# Patient Record
Sex: FEMALE | Race: WHITE | Employment: FULL TIME
[De-identification: names, ages, dates, MRNs, and addresses within clinical notes are randomized per-mention and may not be internally consistent; named-entity substitution may affect disease eponyms.]

---

## 2017-01-31 ENCOUNTER — CARE COORDINATOR VISIT (OUTPATIENT)
Dept: CARE COORDINATION | Facility: CLINIC | Age: 42
End: 2017-01-31

## 2017-01-31 ENCOUNTER — OFFICE VISIT (OUTPATIENT)
Dept: INTERNAL MEDICINE | Facility: CLINIC | Age: 42
End: 2017-01-31

## 2017-01-31 ENCOUNTER — CARE COORDINATION (OUTPATIENT)
Dept: CARE COORDINATION | Facility: CLINIC | Age: 42
End: 2017-01-31

## 2017-01-31 VITALS
DIASTOLIC BLOOD PRESSURE: 74 MMHG | WEIGHT: 220 LBS | HEIGHT: 62 IN | SYSTOLIC BLOOD PRESSURE: 120 MMHG | BODY MASS INDEX: 40.48 KG/M2 | HEART RATE: 90 BPM

## 2017-01-31 DIAGNOSIS — J41.0 SIMPLE CHRONIC BRONCHITIS (HCC): Primary | ICD-10-CM

## 2017-01-31 DIAGNOSIS — F17.200 TOBACCO DEPENDENCE: ICD-10-CM

## 2017-01-31 DIAGNOSIS — F17.200 SMOKER: ICD-10-CM

## 2017-01-31 DIAGNOSIS — I82.503 CHRONIC DEEP VEIN THROMBOSIS (DVT) OF BOTH LOWER EXTREMITIES, UNSPECIFIED VEIN (HCC): ICD-10-CM

## 2017-01-31 DIAGNOSIS — Z13.9 SCREENING: Primary | ICD-10-CM

## 2017-01-31 DIAGNOSIS — H92.02 OTALGIA, LEFT: ICD-10-CM

## 2017-01-31 DIAGNOSIS — J41.0 SIMPLE CHRONIC BRONCHITIS (HCC): ICD-10-CM

## 2017-01-31 DIAGNOSIS — E03.9 HYPOTHYROIDISM, UNSPECIFIED TYPE: ICD-10-CM

## 2017-01-31 DIAGNOSIS — I10 ESSENTIAL HYPERTENSION: ICD-10-CM

## 2017-01-31 PROCEDURE — 99213 OFFICE O/P EST LOW 20 MIN: CPT | Performed by: INTERNAL MEDICINE

## 2017-01-31 RX ORDER — LEVOTHYROXINE SODIUM 0.2 MG/1
200 TABLET ORAL DAILY
Qty: 30 TABLET | Refills: 0 | Status: SHIPPED | OUTPATIENT
Start: 2017-01-31 | End: 2017-02-14 | Stop reason: SDUPTHER

## 2017-01-31 RX ORDER — ALBUTEROL SULFATE 2.5 MG/3ML
2.5 SOLUTION RESPIRATORY (INHALATION) EVERY 4 HOURS PRN
Qty: 300 ML | Refills: 9 | Status: SHIPPED | OUTPATIENT
Start: 2017-01-31 | End: 2019-03-15 | Stop reason: SDUPTHER

## 2017-01-31 RX ORDER — AMOXICILLIN AND CLAVULANATE POTASSIUM 875; 125 MG/1; MG/1
1 TABLET, FILM COATED ORAL 2 TIMES DAILY
Qty: 20 TABLET | Refills: 0 | Status: SHIPPED | OUTPATIENT
Start: 2017-01-31 | End: 2017-02-10

## 2017-01-31 RX ORDER — WARFARIN SODIUM 10 MG/1
10 TABLET ORAL DAILY
Qty: 30 TABLET | Refills: 5 | Status: SHIPPED | OUTPATIENT
Start: 2017-01-31 | End: 2017-09-15

## 2017-01-31 RX ORDER — HYDROCHLOROTHIAZIDE 25 MG/1
25 TABLET ORAL DAILY
Qty: 30 TABLET | Refills: 9 | Status: ON HOLD | OUTPATIENT
Start: 2017-01-31 | End: 2017-08-07 | Stop reason: HOSPADM

## 2017-01-31 ASSESSMENT — ENCOUNTER SYMPTOMS
BACK PAIN: 0
COLOR CHANGE: 0
EYE DISCHARGE: 0
ABDOMINAL PAIN: 0
ABDOMINAL DISTENTION: 0
SHORTNESS OF BREATH: 1
CHOKING: 0
RHINORRHEA: 1
COUGH: 1
STRIDOR: 0
EYE ITCHING: 0
CHEST TIGHTNESS: 0
APNEA: 0

## 2017-02-02 ENCOUNTER — CARE COORDINATION (OUTPATIENT)
Dept: CARE COORDINATION | Facility: CLINIC | Age: 42
End: 2017-02-02

## 2017-02-08 ENCOUNTER — TELEPHONE (OUTPATIENT)
Dept: INTERNAL MEDICINE | Facility: CLINIC | Age: 42
End: 2017-02-08

## 2017-02-13 ENCOUNTER — TELEPHONE (OUTPATIENT)
Dept: INTERNAL MEDICINE | Facility: CLINIC | Age: 42
End: 2017-02-13

## 2017-02-13 ENCOUNTER — OFFICE VISIT (OUTPATIENT)
Dept: INTERNAL MEDICINE | Facility: CLINIC | Age: 42
End: 2017-02-13

## 2017-02-13 ENCOUNTER — HOSPITAL ENCOUNTER (OUTPATIENT)
Age: 42
Setting detail: SPECIMEN
Discharge: HOME | End: 2017-02-13
Payer: COMMERCIAL

## 2017-02-13 VITALS
SYSTOLIC BLOOD PRESSURE: 122 MMHG | BODY MASS INDEX: 40.3 KG/M2 | HEIGHT: 62 IN | DIASTOLIC BLOOD PRESSURE: 78 MMHG | WEIGHT: 219 LBS

## 2017-02-13 DIAGNOSIS — J41.0 SIMPLE CHRONIC BRONCHITIS (HCC): Primary | ICD-10-CM

## 2017-02-13 DIAGNOSIS — I63.321 CEREBRAL INFARCTION DUE TO THROMBOSIS OF RIGHT ANTERIOR CEREBRAL ARTERY (HCC): ICD-10-CM

## 2017-02-13 DIAGNOSIS — I82.503 CHRONIC DEEP VEIN THROMBOSIS (DVT) OF BOTH LOWER EXTREMITIES, UNSPECIFIED VEIN (HCC): ICD-10-CM

## 2017-02-13 DIAGNOSIS — I10 ESSENTIAL HYPERTENSION: ICD-10-CM

## 2017-02-13 DIAGNOSIS — E03.9 ACQUIRED HYPOTHYROIDISM: ICD-10-CM

## 2017-02-13 DIAGNOSIS — E03.9 HYPOTHYROIDISM, UNSPECIFIED TYPE: ICD-10-CM

## 2017-02-13 DIAGNOSIS — J44.1 COPD WITH EXACERBATION (HCC): ICD-10-CM

## 2017-02-13 LAB
INR BLD: 1
PROTHROMBIN TIME: 10.9 SEC (ref 9.4–12.6)
THYROXINE, FREE: 0.77 NG/DL (ref 0.93–1.7)
TSH SERPL DL<=0.05 MIU/L-ACNC: 15.65 MIU/L (ref 0.3–5)

## 2017-02-13 PROCEDURE — 99214 OFFICE O/P EST MOD 30 MIN: CPT | Performed by: INTERNAL MEDICINE

## 2017-02-13 RX ORDER — METHYLPREDNISOLONE 4 MG/1
TABLET ORAL
Qty: 1 KIT | Refills: 0 | Status: SHIPPED | OUTPATIENT
Start: 2017-02-13 | End: 2017-02-19

## 2017-02-13 RX ORDER — CEFUROXIME AXETIL 500 MG/1
500 TABLET ORAL 2 TIMES DAILY
Qty: 20 TABLET | Refills: 0 | Status: SHIPPED | OUTPATIENT
Start: 2017-02-13 | End: 2017-02-23

## 2017-02-13 ASSESSMENT — ENCOUNTER SYMPTOMS
STRIDOR: 0
NAUSEA: 0
BLOOD IN STOOL: 0
WHEEZING: 1
VOICE CHANGE: 0
COUGH: 1
CONSTIPATION: 0
SINUS PRESSURE: 0
CHOKING: 0
EYE PAIN: 0
SORE THROAT: 0
RHINORRHEA: 0
ABDOMINAL PAIN: 0
DIARRHEA: 0
RECTAL PAIN: 0
EYE REDNESS: 0
FACIAL SWELLING: 0
VOMITING: 0
COLOR CHANGE: 0
EYE ITCHING: 0
PHOTOPHOBIA: 0
TROUBLE SWALLOWING: 0
SHORTNESS OF BREATH: 1
ANAL BLEEDING: 0
APNEA: 0
ABDOMINAL DISTENTION: 0
BACK PAIN: 0
EYE DISCHARGE: 0
CHEST TIGHTNESS: 0

## 2017-02-14 DIAGNOSIS — E03.9 HYPOTHYROIDISM, UNSPECIFIED TYPE: ICD-10-CM

## 2017-02-14 DIAGNOSIS — R79.89 HIGH THYROID STIMULATING HORMONE (TSH) LEVEL: Primary | ICD-10-CM

## 2017-02-14 RX ORDER — PREDNISONE 20 MG/1
20 TABLET ORAL DAILY
Qty: 5 TABLET | Refills: 0 | Status: CANCELLED | OUTPATIENT
Start: 2017-02-14 | End: 2017-02-19

## 2017-02-14 RX ORDER — LEVOTHYROXINE SODIUM 0.12 MG/1
250 TABLET ORAL DAILY
Qty: 60 TABLET | Refills: 3 | Status: SHIPPED | OUTPATIENT
Start: 2017-02-14 | End: 2019-03-15 | Stop reason: SDUPTHER

## 2017-02-14 RX ORDER — AMOXICILLIN 875 MG/1
875 TABLET, COATED ORAL DAILY
Qty: 5 TABLET | Refills: 0 | Status: SHIPPED | OUTPATIENT
Start: 2017-02-14 | End: 2017-02-19

## 2017-02-14 RX ORDER — AMOXICILLIN 250 MG/1
250 CAPSULE ORAL 3 TIMES DAILY
Qty: 30 CAPSULE | Refills: 0 | Status: CANCELLED | OUTPATIENT
Start: 2017-02-14 | End: 2017-02-24

## 2017-02-14 RX ORDER — LEVOTHYROXINE SODIUM 0.12 MG/1
250 TABLET ORAL DAILY
Qty: 60 TABLET | Refills: 3 | Status: SHIPPED | OUTPATIENT
Start: 2017-02-14 | End: 2017-02-14 | Stop reason: SDUPTHER

## 2017-02-14 RX ORDER — PREDNISONE 20 MG/1
20 TABLET ORAL DAILY
Qty: 10 TABLET | Refills: 0 | Status: SHIPPED | OUTPATIENT
Start: 2017-02-14 | End: 2017-02-24

## 2017-03-03 ENCOUNTER — HOSPITAL ENCOUNTER (OUTPATIENT)
Dept: WOMENS IMAGING | Age: 42
Discharge: HOME | End: 2017-03-03
Payer: COMMERCIAL

## 2017-03-03 DIAGNOSIS — Z13.9 SCREENING: ICD-10-CM

## 2017-03-03 PROCEDURE — G0202 SCR MAMMO BI INCL CAD: HCPCS

## 2017-04-10 ENCOUNTER — CARE COORDINATION (OUTPATIENT)
Dept: CARE COORDINATION | Age: 42
End: 2017-04-10

## 2017-04-27 ENCOUNTER — CARE COORDINATION (OUTPATIENT)
Dept: CARE COORDINATION | Age: 42
End: 2017-04-27

## 2017-06-20 ENCOUNTER — OFFICE VISIT (OUTPATIENT)
Dept: INTERNAL MEDICINE CLINIC | Age: 42
End: 2017-06-20
Payer: COMMERCIAL

## 2017-06-20 ENCOUNTER — HOSPITAL ENCOUNTER (OUTPATIENT)
Age: 42
Setting detail: SPECIMEN
Discharge: HOME | End: 2017-06-20
Payer: COMMERCIAL

## 2017-06-20 ENCOUNTER — HOSPITAL ENCOUNTER (OUTPATIENT)
Dept: GENERAL RADIOLOGY | Facility: CLINIC | Age: 42
Discharge: HOME | End: 2017-06-20
Payer: COMMERCIAL

## 2017-06-20 ENCOUNTER — HOSPITAL ENCOUNTER (OUTPATIENT)
Facility: CLINIC | Age: 42
Discharge: HOME | End: 2017-06-20
Payer: COMMERCIAL

## 2017-06-20 VITALS
WEIGHT: 217 LBS | BODY MASS INDEX: 39.93 KG/M2 | SYSTOLIC BLOOD PRESSURE: 130 MMHG | HEART RATE: 85 BPM | HEIGHT: 62 IN | DIASTOLIC BLOOD PRESSURE: 80 MMHG

## 2017-06-20 DIAGNOSIS — E03.9 HYPOTHYROIDISM, UNSPECIFIED TYPE: ICD-10-CM

## 2017-06-20 DIAGNOSIS — J01.00 ACUTE NON-RECURRENT MAXILLARY SINUSITIS: Primary | ICD-10-CM

## 2017-06-20 DIAGNOSIS — M79.642 PAIN OF LEFT HAND: ICD-10-CM

## 2017-06-20 LAB — TSH SERPL DL<=0.05 MIU/L-ACNC: 4.1 MIU/L (ref 0.3–5)

## 2017-06-20 PROCEDURE — G8598 ASA/ANTIPLAT THER USED: HCPCS | Performed by: INTERNAL MEDICINE

## 2017-06-20 PROCEDURE — G8427 DOCREV CUR MEDS BY ELIG CLIN: HCPCS | Performed by: INTERNAL MEDICINE

## 2017-06-20 PROCEDURE — 73120 X-RAY EXAM OF HAND: CPT

## 2017-06-20 PROCEDURE — 99213 OFFICE O/P EST LOW 20 MIN: CPT | Performed by: INTERNAL MEDICINE

## 2017-06-20 PROCEDURE — G8417 CALC BMI ABV UP PARAM F/U: HCPCS | Performed by: INTERNAL MEDICINE

## 2017-06-20 PROCEDURE — 4004F PT TOBACCO SCREEN RCVD TLK: CPT | Performed by: INTERNAL MEDICINE

## 2017-06-20 RX ORDER — LORATADINE 10 MG/1
10 TABLET ORAL DAILY
Qty: 10 TABLET | Refills: 0 | Status: SHIPPED | OUTPATIENT
Start: 2017-06-20 | End: 2017-08-02 | Stop reason: ALTCHOICE

## 2017-06-20 RX ORDER — AMOXICILLIN 500 MG/1
500 CAPSULE ORAL 3 TIMES DAILY
Qty: 30 CAPSULE | Refills: 0 | Status: SHIPPED | OUTPATIENT
Start: 2017-06-20 | End: 2017-06-30

## 2017-06-20 ASSESSMENT — ENCOUNTER SYMPTOMS
ABDOMINAL PAIN: 0
APNEA: 0
BLOOD IN STOOL: 0
RHINORRHEA: 1
EYE DISCHARGE: 0
EYE REDNESS: 0
CHOKING: 0
CHEST TIGHTNESS: 0
EYE ITCHING: 0
COUGH: 1
SHORTNESS OF BREATH: 0
SINUS PRESSURE: 1
EYE PAIN: 0
DIARRHEA: 0
ABDOMINAL DISTENTION: 0
COLOR CHANGE: 0
BACK PAIN: 0
CONSTIPATION: 0

## 2017-06-20 ASSESSMENT — PATIENT HEALTH QUESTIONNAIRE - PHQ9
SUM OF ALL RESPONSES TO PHQ QUESTIONS 1-9: 0
SUM OF ALL RESPONSES TO PHQ9 QUESTIONS 1 & 2: 0
1. LITTLE INTEREST OR PLEASURE IN DOING THINGS: 0
2. FEELING DOWN, DEPRESSED OR HOPELESS: 0

## 2017-06-27 ENCOUNTER — OFFICE VISIT (OUTPATIENT)
Dept: ORTHOPEDIC SURGERY | Age: 42
End: 2017-06-27
Payer: COMMERCIAL

## 2017-06-27 DIAGNOSIS — S82.65XA CLOSED NONDISPLACED FRACTURE OF LATERAL MALLEOLUS OF LEFT FIBULA, INITIAL ENCOUNTER: Primary | ICD-10-CM

## 2017-06-27 PROCEDURE — 99203 OFFICE O/P NEW LOW 30 MIN: CPT | Performed by: ORTHOPAEDIC SURGERY

## 2017-06-27 PROCEDURE — 27786 TREATMENT OF ANKLE FRACTURE: CPT | Performed by: ORTHOPAEDIC SURGERY

## 2017-06-27 RX ORDER — HYDROCODONE BITARTRATE AND ACETAMINOPHEN 5; 325 MG/1; MG/1
1-2 TABLET ORAL EVERY 4 HOURS PRN
Qty: 40 TABLET | Refills: 0 | Status: SHIPPED | OUTPATIENT
Start: 2017-06-27 | End: 2017-07-04

## 2017-07-13 ENCOUNTER — OFFICE VISIT (OUTPATIENT)
Dept: ORTHOPEDIC SURGERY | Age: 42
End: 2017-07-13

## 2017-07-13 DIAGNOSIS — S82.65XD CLOSED NONDISPLACED FRACTURE OF LATERAL MALLEOLUS OF LEFT FIBULA WITH ROUTINE HEALING, SUBSEQUENT ENCOUNTER: Primary | ICD-10-CM

## 2017-07-13 PROCEDURE — 99024 POSTOP FOLLOW-UP VISIT: CPT | Performed by: ORTHOPAEDIC SURGERY

## 2017-07-13 RX ORDER — HYDROCODONE BITARTRATE AND ACETAMINOPHEN 5; 325 MG/1; MG/1
1-2 TABLET ORAL EVERY 4 HOURS PRN
Qty: 40 TABLET | Refills: 0 | Status: SHIPPED | OUTPATIENT
Start: 2017-07-13 | End: 2017-07-20

## 2017-08-02 ENCOUNTER — OFFICE VISIT (OUTPATIENT)
Dept: INTERNAL MEDICINE CLINIC | Age: 42
End: 2017-08-02
Payer: COMMERCIAL

## 2017-08-02 ENCOUNTER — HOSPITAL ENCOUNTER (OUTPATIENT)
Age: 42
Setting detail: SPECIMEN
Discharge: HOME | End: 2017-08-02
Payer: COMMERCIAL

## 2017-08-02 VITALS
BODY MASS INDEX: 38.28 KG/M2 | SYSTOLIC BLOOD PRESSURE: 138 MMHG | HEIGHT: 62 IN | DIASTOLIC BLOOD PRESSURE: 78 MMHG | WEIGHT: 208 LBS

## 2017-08-02 DIAGNOSIS — E03.9 ACQUIRED HYPOTHYROIDISM: ICD-10-CM

## 2017-08-02 DIAGNOSIS — K52.9 GASTROENTERITIS: Primary | ICD-10-CM

## 2017-08-02 DIAGNOSIS — K52.9 GASTROENTERITIS: ICD-10-CM

## 2017-08-02 DIAGNOSIS — R11.12 PROJECTILE VOMITING WITH NAUSEA: ICD-10-CM

## 2017-08-02 DIAGNOSIS — I10 ESSENTIAL HYPERTENSION: ICD-10-CM

## 2017-08-02 LAB
ALBUMIN SERPL-MCNC: 4.4 G/DL (ref 3.5–5.2)
ALBUMIN/GLOBULIN RATIO: 1.3 (ref 1–2.5)
ALP BLD-CCNC: 164 U/L (ref 35–104)
ALT SERPL-CCNC: 101 U/L (ref 5–33)
ANION GAP SERPL CALCULATED.3IONS-SCNC: 13 MMOL/L (ref 9–17)
AST SERPL-CCNC: 73 U/L
BILIRUB SERPL-MCNC: 0.41 MG/DL (ref 0.3–1.2)
BUN BLDV-MCNC: 6 MG/DL (ref 6–20)
BUN/CREAT BLD: ABNORMAL (ref 9–20)
CALCIUM SERPL-MCNC: 15.7 MG/DL (ref 8.6–10.4)
CHLORIDE BLD-SCNC: 102 MMOL/L (ref 98–107)
CO2: 25 MMOL/L (ref 20–31)
CREAT SERPL-MCNC: 0.56 MG/DL (ref 0.5–0.9)
GFR AFRICAN AMERICAN: >60 ML/MIN
GFR NON-AFRICAN AMERICAN: >60 ML/MIN
GFR SERPL CREATININE-BSD FRML MDRD: ABNORMAL ML/MIN/{1.73_M2}
GFR SERPL CREATININE-BSD FRML MDRD: ABNORMAL ML/MIN/{1.73_M2}
GLUCOSE BLD-MCNC: 123 MG/DL (ref 70–99)
LIPASE: 35 U/L (ref 13–60)
POTASSIUM SERPL-SCNC: 3.9 MMOL/L (ref 3.7–5.3)
SODIUM BLD-SCNC: 140 MMOL/L (ref 135–144)
TOTAL PROTEIN: 7.8 G/DL (ref 6.4–8.3)

## 2017-08-02 PROCEDURE — G8598 ASA/ANTIPLAT THER USED: HCPCS | Performed by: INTERNAL MEDICINE

## 2017-08-02 PROCEDURE — 4004F PT TOBACCO SCREEN RCVD TLK: CPT | Performed by: INTERNAL MEDICINE

## 2017-08-02 PROCEDURE — 99214 OFFICE O/P EST MOD 30 MIN: CPT | Performed by: INTERNAL MEDICINE

## 2017-08-02 PROCEDURE — G8427 DOCREV CUR MEDS BY ELIG CLIN: HCPCS | Performed by: INTERNAL MEDICINE

## 2017-08-02 PROCEDURE — G8417 CALC BMI ABV UP PARAM F/U: HCPCS | Performed by: INTERNAL MEDICINE

## 2017-08-02 RX ORDER — METOCLOPRAMIDE 10 MG/1
10 TABLET ORAL 4 TIMES DAILY
Qty: 120 TABLET | Refills: 3 | Status: SHIPPED | OUTPATIENT
Start: 2017-08-02 | End: 2019-03-15

## 2017-08-02 RX ORDER — OMEPRAZOLE 20 MG/1
20 CAPSULE, DELAYED RELEASE ORAL DAILY
Qty: 30 CAPSULE | Refills: 3 | Status: SHIPPED | OUTPATIENT
Start: 2017-08-02 | End: 2018-07-11 | Stop reason: ALTCHOICE

## 2017-08-02 RX ORDER — LEVOTHYROXINE SODIUM 0.07 MG/1
75 TABLET ORAL
COMMUNITY
End: 2017-09-15 | Stop reason: DRUGHIGH

## 2017-08-02 RX ORDER — OXYCODONE HYDROCHLORIDE AND ACETAMINOPHEN 5; 325 MG/1; MG/1
TABLET ORAL
Refills: 0 | COMMUNITY
Start: 2017-06-24 | End: 2017-08-14 | Stop reason: ALTCHOICE

## 2017-08-02 ASSESSMENT — ENCOUNTER SYMPTOMS
EYE DISCHARGE: 0
VOMITING: 1
SHORTNESS OF BREATH: 0
WHEEZING: 0
TROUBLE SWALLOWING: 0
EYE PAIN: 0
BLOOD IN STOOL: 0
ABDOMINAL PAIN: 1
ABDOMINAL DISTENTION: 0
DIARRHEA: 0
NAUSEA: 1
COLOR CHANGE: 0
COUGH: 0

## 2017-08-03 ENCOUNTER — APPOINTMENT (OUTPATIENT)
Dept: GENERAL RADIOLOGY | Age: 42
DRG: 645 | End: 2017-08-03
Payer: COMMERCIAL

## 2017-08-03 ENCOUNTER — HOSPITAL ENCOUNTER (INPATIENT)
Age: 42
LOS: 4 days | Discharge: HOME | DRG: 645 | End: 2017-08-07
Attending: EMERGENCY MEDICINE | Admitting: INTERNAL MEDICINE
Payer: COMMERCIAL

## 2017-08-03 ENCOUNTER — APPOINTMENT (OUTPATIENT)
Dept: ULTRASOUND IMAGING | Age: 42
DRG: 645 | End: 2017-08-03
Payer: COMMERCIAL

## 2017-08-03 DIAGNOSIS — E83.52 HYPERCALCEMIA: Primary | ICD-10-CM

## 2017-08-03 LAB
ABSOLUTE EOS #: 0.18 K/UL (ref 0–0.4)
ABSOLUTE LYMPH #: 5.45 K/UL (ref 1–4.8)
ABSOLUTE MONO #: 0.44 K/UL (ref 0.1–1.3)
ALBUMIN SERPL-MCNC: 4.2 G/DL (ref 3.5–5.2)
ALBUMIN/GLOBULIN RATIO: ABNORMAL (ref 1–2.5)
ALP BLD-CCNC: 168 U/L (ref 35–104)
ALT SERPL-CCNC: 96 U/L (ref 5–33)
ANGIOTENSIN-CONVERTING ENZYME: 28 U/L (ref 8–52)
ANION GAP SERPL CALCULATED.3IONS-SCNC: 10 MMOL/L (ref 9–17)
ANION GAP SERPL CALCULATED.3IONS-SCNC: 11 MMOL/L (ref 9–17)
AST SERPL-CCNC: 66 U/L
BASOPHILS # BLD: 0 %
BASOPHILS ABSOLUTE: 0 K/UL (ref 0–0.2)
BILIRUB SERPL-MCNC: 0.33 MG/DL (ref 0.3–1.2)
BUN BLDV-MCNC: 5 MG/DL (ref 6–20)
BUN BLDV-MCNC: 6 MG/DL (ref 6–20)
BUN/CREAT BLD: ABNORMAL (ref 9–20)
BUN/CREAT BLD: ABNORMAL (ref 9–20)
CALCIUM IONIZED: 2.36 MMOL/L (ref 1.13–1.33)
CALCIUM SERPL-MCNC: 15.5 MG/DL (ref 8.6–10.4)
CALCIUM SERPL-MCNC: 16 MG/DL (ref 8.6–10.4)
CALCIUM URINE: 32.5 MG/DL
CHLORIDE BLD-SCNC: 100 MMOL/L (ref 98–107)
CHLORIDE BLD-SCNC: 102 MMOL/L (ref 98–107)
CO2: 28 MMOL/L (ref 20–31)
CO2: 29 MMOL/L (ref 20–31)
CREAT SERPL-MCNC: 0.49 MG/DL (ref 0.5–0.9)
CREAT SERPL-MCNC: 0.55 MG/DL (ref 0.5–0.9)
DIFFERENTIAL TYPE: ABNORMAL
EOSINOPHILS RELATIVE PERCENT: 2 %
GFR AFRICAN AMERICAN: >60 ML/MIN
GFR AFRICAN AMERICAN: >60 ML/MIN
GFR NON-AFRICAN AMERICAN: >60 ML/MIN
GFR NON-AFRICAN AMERICAN: >60 ML/MIN
GFR SERPL CREATININE-BSD FRML MDRD: ABNORMAL ML/MIN/{1.73_M2}
GLUCOSE BLD-MCNC: 107 MG/DL (ref 70–99)
GLUCOSE BLD-MCNC: 143 MG/DL (ref 70–99)
HCT VFR BLD CALC: 44.3 % (ref 36–46)
HEMOGLOBIN: 15.2 G/DL (ref 12–16)
LYMPHOCYTES # BLD: 62 %
MCH RBC QN AUTO: 32.6 PG (ref 26–34)
MCHC RBC AUTO-ENTMCNC: 34.3 G/DL (ref 31–37)
MCV RBC AUTO: 95.2 FL (ref 80–100)
MONOCYTES # BLD: 5 %
MORPHOLOGY: NORMAL
PDW BLD-RTO: 12.3 % (ref 11.5–14.9)
PHOSPHORUS: 2.3 MG/DL (ref 2.6–4.5)
PLATELET # BLD: 185 K/UL (ref 150–450)
PLATELET ESTIMATE: ABNORMAL
PMV BLD AUTO: 9.5 FL (ref 6–12)
POTASSIUM SERPL-SCNC: 3.6 MMOL/L (ref 3.7–5.3)
POTASSIUM SERPL-SCNC: 3.6 MMOL/L (ref 3.7–5.3)
PTH INTACT: 220.4 PG/ML (ref 15–65)
RBC # BLD: 4.65 M/UL (ref 4–5.2)
RBC # BLD: ABNORMAL 10*6/UL
SEG NEUTROPHILS: 31 %
SEGMENTED NEUTROPHILS ABSOLUTE COUNT: 2.73 K/UL (ref 1.3–9.1)
SODIUM BLD-SCNC: 139 MMOL/L (ref 135–144)
SODIUM BLD-SCNC: 141 MMOL/L (ref 135–144)
TOTAL PROTEIN: 7.6 G/DL (ref 6.4–8.3)
TSH SERPL DL<=0.05 MIU/L-ACNC: 4.41 MIU/L (ref 0.3–5)
VITAMIN D 25-HYDROXY: 18.1 NG/ML (ref 30–100)
WBC # BLD: 8.8 K/UL (ref 3.5–11)
WBC # BLD: ABNORMAL 10*3/UL

## 2017-08-03 PROCEDURE — 84156 ASSAY OF PROTEIN URINE: CPT

## 2017-08-03 PROCEDURE — 80048 BASIC METABOLIC PNL TOTAL CA: CPT

## 2017-08-03 PROCEDURE — 96374 THER/PROPH/DIAG INJ IV PUSH: CPT

## 2017-08-03 PROCEDURE — 76536 US EXAM OF HEAD AND NECK: CPT

## 2017-08-03 PROCEDURE — 94664 DEMO&/EVAL PT USE INHALER: CPT

## 2017-08-03 PROCEDURE — 84443 ASSAY THYROID STIM HORMONE: CPT

## 2017-08-03 PROCEDURE — 2580000003 HC RX 258: Performed by: STUDENT IN AN ORGANIZED HEALTH CARE EDUCATION/TRAINING PROGRAM

## 2017-08-03 PROCEDURE — 36415 COLL VENOUS BLD VENIPUNCTURE: CPT

## 2017-08-03 PROCEDURE — 82330 ASSAY OF CALCIUM: CPT

## 2017-08-03 PROCEDURE — 84165 PROTEIN E-PHORESIS SERUM: CPT

## 2017-08-03 PROCEDURE — 99223 1ST HOSP IP/OBS HIGH 75: CPT | Performed by: INTERNAL MEDICINE

## 2017-08-03 PROCEDURE — 94760 N-INVAS EAR/PLS OXIMETRY 1: CPT

## 2017-08-03 PROCEDURE — 6370000000 HC RX 637 (ALT 250 FOR IP): Performed by: INTERNAL MEDICINE

## 2017-08-03 PROCEDURE — 2580000003 HC RX 258: Performed by: EMERGENCY MEDICINE

## 2017-08-03 PROCEDURE — 71020 XR CHEST STANDARD TWO VW: CPT

## 2017-08-03 PROCEDURE — G8978 MOBILITY CURRENT STATUS: HCPCS

## 2017-08-03 PROCEDURE — 93005 ELECTROCARDIOGRAM TRACING: CPT

## 2017-08-03 PROCEDURE — 6360000002 HC RX W HCPCS: Performed by: EMERGENCY MEDICINE

## 2017-08-03 PROCEDURE — 83519 RIA NONANTIBODY: CPT

## 2017-08-03 PROCEDURE — 84100 ASSAY OF PHOSPHORUS: CPT

## 2017-08-03 PROCEDURE — 84155 ASSAY OF PROTEIN SERUM: CPT

## 2017-08-03 PROCEDURE — 97161 PT EVAL LOW COMPLEX 20 MIN: CPT

## 2017-08-03 PROCEDURE — 84166 PROTEIN E-PHORESIS/URINE/CSF: CPT

## 2017-08-03 PROCEDURE — 83970 ASSAY OF PARATHORMONE: CPT

## 2017-08-03 PROCEDURE — 6360000002 HC RX W HCPCS: Performed by: STUDENT IN AN ORGANIZED HEALTH CARE EDUCATION/TRAINING PROGRAM

## 2017-08-03 PROCEDURE — 82164 ANGIOTENSIN I ENZYME TEST: CPT

## 2017-08-03 PROCEDURE — 82652 VIT D 1 25-DIHYDROXY: CPT

## 2017-08-03 PROCEDURE — 2060000000 HC ICU INTERMEDIATE R&B

## 2017-08-03 PROCEDURE — 82306 VITAMIN D 25 HYDROXY: CPT

## 2017-08-03 PROCEDURE — 80053 COMPREHEN METABOLIC PANEL: CPT

## 2017-08-03 PROCEDURE — 85025 COMPLETE CBC W/AUTO DIFF WBC: CPT

## 2017-08-03 PROCEDURE — G8979 MOBILITY GOAL STATUS: HCPCS

## 2017-08-03 PROCEDURE — 6370000000 HC RX 637 (ALT 250 FOR IP): Performed by: EMERGENCY MEDICINE

## 2017-08-03 PROCEDURE — 6370000000 HC RX 637 (ALT 250 FOR IP): Performed by: STUDENT IN AN ORGANIZED HEALTH CARE EDUCATION/TRAINING PROGRAM

## 2017-08-03 PROCEDURE — 82340 ASSAY OF CALCIUM IN URINE: CPT

## 2017-08-03 PROCEDURE — 99285 EMERGENCY DEPT VISIT HI MDM: CPT

## 2017-08-03 RX ORDER — ONDANSETRON 2 MG/ML
4 INJECTION INTRAMUSCULAR; INTRAVENOUS ONCE
Status: COMPLETED | OUTPATIENT
Start: 2017-08-03 | End: 2017-08-03

## 2017-08-03 RX ORDER — PANTOPRAZOLE SODIUM 40 MG/1
40 TABLET, DELAYED RELEASE ORAL
Status: DISCONTINUED | OUTPATIENT
Start: 2017-08-03 | End: 2017-08-07 | Stop reason: HOSPADM

## 2017-08-03 RX ORDER — CALCITONIN SALMON 200 [IU]/.09ML
1 SPRAY, METERED NASAL ONCE
Status: DISCONTINUED | OUTPATIENT
Start: 2017-08-03 | End: 2017-08-03

## 2017-08-03 RX ORDER — PROPRANOLOL HYDROCHLORIDE 80 MG/1
80 CAPSULE, EXTENDED RELEASE ORAL DAILY
Status: DISCONTINUED | OUTPATIENT
Start: 2017-08-03 | End: 2017-08-07 | Stop reason: HOSPADM

## 2017-08-03 RX ORDER — LEVOTHYROXINE SODIUM 0.12 MG/1
250 TABLET ORAL DAILY
Status: DISCONTINUED | OUTPATIENT
Start: 2017-08-03 | End: 2017-08-07 | Stop reason: HOSPADM

## 2017-08-03 RX ORDER — POTASSIUM CHLORIDE 20 MEQ/1
40 TABLET, EXTENDED RELEASE ORAL PRN
Status: DISCONTINUED | OUTPATIENT
Start: 2017-08-03 | End: 2017-08-07 | Stop reason: HOSPADM

## 2017-08-03 RX ORDER — HYDROCODONE BITARTRATE AND ACETAMINOPHEN 5; 325 MG/1; MG/1
1 TABLET ORAL EVERY 6 HOURS PRN
COMMUNITY
End: 2017-09-15 | Stop reason: ALTCHOICE

## 2017-08-03 RX ORDER — ACETAMINOPHEN 500 MG
1000 TABLET ORAL ONCE
Status: COMPLETED | OUTPATIENT
Start: 2017-08-03 | End: 2017-08-03

## 2017-08-03 RX ORDER — PANTOPRAZOLE SODIUM 40 MG/1
40 TABLET, DELAYED RELEASE ORAL
Status: DISCONTINUED | OUTPATIENT
Start: 2017-08-04 | End: 2017-08-03

## 2017-08-03 RX ORDER — SODIUM CHLORIDE 0.9 % (FLUSH) 0.9 %
10 SYRINGE (ML) INJECTION PRN
Status: DISCONTINUED | OUTPATIENT
Start: 2017-08-03 | End: 2017-08-07 | Stop reason: HOSPADM

## 2017-08-03 RX ORDER — ONDANSETRON 2 MG/ML
4 INJECTION INTRAMUSCULAR; INTRAVENOUS EVERY 6 HOURS PRN
Status: DISCONTINUED | OUTPATIENT
Start: 2017-08-03 | End: 2017-08-07 | Stop reason: HOSPADM

## 2017-08-03 RX ORDER — NICOTINE 21 MG/24HR
1 PATCH, TRANSDERMAL 24 HOURS TRANSDERMAL DAILY
Status: DISCONTINUED | OUTPATIENT
Start: 2017-08-03 | End: 2017-08-07 | Stop reason: HOSPADM

## 2017-08-03 RX ORDER — 0.9 % SODIUM CHLORIDE 0.9 %
1000 INTRAVENOUS SOLUTION INTRAVENOUS ONCE
Status: COMPLETED | OUTPATIENT
Start: 2017-08-03 | End: 2017-08-03

## 2017-08-03 RX ORDER — MORPHINE SULFATE 4 MG/ML
2 INJECTION, SOLUTION INTRAMUSCULAR; INTRAVENOUS EVERY 4 HOURS PRN
Status: DISCONTINUED | OUTPATIENT
Start: 2017-08-03 | End: 2017-08-07 | Stop reason: HOSPADM

## 2017-08-03 RX ORDER — ACETAMINOPHEN 325 MG/1
650 TABLET ORAL EVERY 4 HOURS PRN
Status: DISCONTINUED | OUTPATIENT
Start: 2017-08-03 | End: 2017-08-07 | Stop reason: HOSPADM

## 2017-08-03 RX ORDER — POTASSIUM CHLORIDE 7.45 MG/ML
10 INJECTION INTRAVENOUS PRN
Status: DISCONTINUED | OUTPATIENT
Start: 2017-08-03 | End: 2017-08-07 | Stop reason: HOSPADM

## 2017-08-03 RX ORDER — SODIUM CHLORIDE 9 MG/ML
INJECTION, SOLUTION INTRAVENOUS CONTINUOUS
Status: DISCONTINUED | OUTPATIENT
Start: 2017-08-03 | End: 2017-08-07 | Stop reason: HOSPADM

## 2017-08-03 RX ORDER — POTASSIUM CHLORIDE 20MEQ/15ML
40 LIQUID (ML) ORAL PRN
Status: DISCONTINUED | OUTPATIENT
Start: 2017-08-03 | End: 2017-08-07 | Stop reason: HOSPADM

## 2017-08-03 RX ORDER — AMLODIPINE BESYLATE 2.5 MG/1
2.5 TABLET ORAL DAILY
Status: DISCONTINUED | OUTPATIENT
Start: 2017-08-03 | End: 2017-08-07 | Stop reason: HOSPADM

## 2017-08-03 RX ORDER — SODIUM CHLORIDE 0.9 % (FLUSH) 0.9 %
10 SYRINGE (ML) INJECTION EVERY 12 HOURS SCHEDULED
Status: DISCONTINUED | OUTPATIENT
Start: 2017-08-03 | End: 2017-08-07 | Stop reason: HOSPADM

## 2017-08-03 RX ADMIN — AMLODIPINE BESYLATE 2.5 MG: 2.5 TABLET ORAL at 20:59

## 2017-08-03 RX ADMIN — LEVOTHYROXINE SODIUM 250 MCG: 125 TABLET ORAL at 20:59

## 2017-08-03 RX ADMIN — ENOXAPARIN SODIUM 40 MG: 40 INJECTION SUBCUTANEOUS at 12:23

## 2017-08-03 RX ADMIN — SODIUM CHLORIDE 1000 ML: 9 INJECTION, SOLUTION INTRAVENOUS at 08:51

## 2017-08-03 RX ADMIN — ONDANSETRON 4 MG: 2 INJECTION INTRAMUSCULAR; INTRAVENOUS at 10:15

## 2017-08-03 RX ADMIN — PROPRANOLOL HYDROCHLORIDE 80 MG: 80 CAPSULE, EXTENDED RELEASE ORAL at 20:59

## 2017-08-03 RX ADMIN — PANTOPRAZOLE SODIUM 40 MG: 40 TABLET, DELAYED RELEASE ORAL at 15:38

## 2017-08-03 RX ADMIN — ACETAMINOPHEN 1000 MG: 500 TABLET ORAL at 10:15

## 2017-08-03 RX ADMIN — SODIUM CHLORIDE: 9 INJECTION, SOLUTION INTRAVENOUS at 12:23

## 2017-08-03 RX ADMIN — ONDANSETRON 4 MG: 2 INJECTION INTRAMUSCULAR; INTRAVENOUS at 15:38

## 2017-08-03 RX ADMIN — Medication 10 ML: at 12:23

## 2017-08-03 RX ADMIN — SODIUM CHLORIDE 1000 ML: 9 INJECTION, SOLUTION INTRAVENOUS at 15:19

## 2017-08-03 RX ADMIN — PAMIDRONATE DISODIUM 90 MG: 9 INJECTION, POWDER, LYOPHILIZED, FOR SOLUTION INTRAVENOUS at 15:19

## 2017-08-03 ASSESSMENT — ENCOUNTER SYMPTOMS
ABDOMINAL DISTENTION: 1
EYE PAIN: 0
SHORTNESS OF BREATH: 0
COUGH: 0
NAUSEA: 1
CHEST TIGHTNESS: 0
BACK PAIN: 0
BLOOD IN STOOL: 0
ABDOMINAL PAIN: 1
EYE REDNESS: 0
VOMITING: 1
RHINORRHEA: 0
DIARRHEA: 1

## 2017-08-03 ASSESSMENT — PAIN DESCRIPTION - LOCATION: LOCATION: FLANK

## 2017-08-03 ASSESSMENT — PAIN SCALES - WONG BAKER: WONGBAKER_NUMERICALRESPONSE: 8

## 2017-08-03 ASSESSMENT — PAIN DESCRIPTION - ORIENTATION: ORIENTATION: RIGHT

## 2017-08-03 ASSESSMENT — PAIN SCALES - GENERAL
PAINLEVEL_OUTOF10: 7
PAINLEVEL_OUTOF10: 7

## 2017-08-04 ENCOUNTER — APPOINTMENT (OUTPATIENT)
Dept: CT IMAGING | Age: 42
DRG: 645 | End: 2017-08-04
Payer: COMMERCIAL

## 2017-08-04 ENCOUNTER — APPOINTMENT (OUTPATIENT)
Dept: NUCLEAR MEDICINE | Age: 42
DRG: 645 | End: 2017-08-04
Payer: COMMERCIAL

## 2017-08-04 LAB
ABSOLUTE EOS #: 0.3 K/UL (ref 0–0.4)
ABSOLUTE LYMPH #: 3.6 K/UL (ref 1–4.8)
ABSOLUTE MONO #: 0.5 K/UL (ref 0.1–1.3)
ANION GAP SERPL CALCULATED.3IONS-SCNC: 12 MMOL/L (ref 9–17)
BASOPHILS # BLD: 1 %
BASOPHILS ABSOLUTE: 0.1 K/UL (ref 0–0.2)
BUN BLDV-MCNC: 5 MG/DL (ref 6–20)
BUN/CREAT BLD: ABNORMAL (ref 9–20)
CALCIUM IONIZED: 2.17 MMOL/L (ref 1.13–1.33)
CALCIUM SERPL-MCNC: 14.8 MG/DL (ref 8.6–10.4)
CALCIUM TIMED UR: ABNORMAL MG/X H
CALCIUM URINE: 14.8 MG/DL
CALCIUM, URINE: 951 MG/24 H (ref 20–275)
CHLORIDE BLD-SCNC: 103 MMOL/L (ref 98–107)
CO2: 26 MMOL/L (ref 20–31)
CREAT SERPL-MCNC: 0.57 MG/DL (ref 0.5–0.9)
CREATININE TIMED UR: ABNORMAL MG/ X H
CREATININE URINE: 24.8 MG/DL
CREATININE, 24H UR: 1593 MG/24 H (ref 740–1570)
DIFFERENTIAL TYPE: NORMAL
EOSINOPHILS RELATIVE PERCENT: 3 %
GFR AFRICAN AMERICAN: >60 ML/MIN
GFR NON-AFRICAN AMERICAN: >60 ML/MIN
GFR SERPL CREATININE-BSD FRML MDRD: ABNORMAL ML/MIN/{1.73_M2}
GFR SERPL CREATININE-BSD FRML MDRD: ABNORMAL ML/MIN/{1.73_M2}
GLUCOSE BLD-MCNC: 112 MG/DL (ref 70–99)
HCT VFR BLD CALC: 41.9 % (ref 36–46)
HEMOGLOBIN: 14.3 G/DL (ref 12–16)
HOURS COLLECTED: 24 H
HOURS COLLECTED: 24 H
LYMPHOCYTES # BLD: 41 %
MAGNESIUM: 1.5 MG/DL (ref 1.6–2.6)
MCH RBC QN AUTO: 33 PG (ref 26–34)
MCHC RBC AUTO-ENTMCNC: 34.3 G/DL (ref 31–37)
MCV RBC AUTO: 96.2 FL (ref 80–100)
MONOCYTES # BLD: 6 %
PDW BLD-RTO: 11.9 % (ref 11.5–14.9)
PHOSPHORUS: 1.2 MG/DL (ref 2.6–4.5)
PHOSPHORUS: 2 MG/DL (ref 2.6–4.5)
PLATELET # BLD: 183 K/UL (ref 150–450)
PLATELET ESTIMATE: NORMAL
PMV BLD AUTO: 9.8 FL (ref 6–12)
POTASSIUM SERPL-SCNC: 3.5 MMOL/L (ref 3.7–5.3)
RBC # BLD: 4.35 M/UL (ref 4–5.2)
RBC # BLD: NORMAL 10*6/UL
SEG NEUTROPHILS: 49 %
SEGMENTED NEUTROPHILS ABSOLUTE COUNT: 4.4 K/UL (ref 1.3–9.1)
SODIUM BLD-SCNC: 141 MMOL/L (ref 135–144)
VOLUME: 6425 ML
VOLUME: 6425 ML
WBC # BLD: 9 K/UL (ref 3.5–11)
WBC # BLD: NORMAL 10*3/UL

## 2017-08-04 PROCEDURE — 82330 ASSAY OF CALCIUM: CPT

## 2017-08-04 PROCEDURE — 80048 BASIC METABOLIC PNL TOTAL CA: CPT

## 2017-08-04 PROCEDURE — 84100 ASSAY OF PHOSPHORUS: CPT

## 2017-08-04 PROCEDURE — 2060000000 HC ICU INTERMEDIATE R&B

## 2017-08-04 PROCEDURE — 6370000000 HC RX 637 (ALT 250 FOR IP): Performed by: STUDENT IN AN ORGANIZED HEALTH CARE EDUCATION/TRAINING PROGRAM

## 2017-08-04 PROCEDURE — 99233 SBSQ HOSP IP/OBS HIGH 50: CPT | Performed by: INTERNAL MEDICINE

## 2017-08-04 PROCEDURE — 85025 COMPLETE CBC W/AUTO DIFF WBC: CPT

## 2017-08-04 PROCEDURE — 6360000004 HC RX CONTRAST MEDICATION: Performed by: INTERNAL MEDICINE

## 2017-08-04 PROCEDURE — 6360000002 HC RX W HCPCS: Performed by: STUDENT IN AN ORGANIZED HEALTH CARE EDUCATION/TRAINING PROGRAM

## 2017-08-04 PROCEDURE — 2500000003 HC RX 250 WO HCPCS: Performed by: STUDENT IN AN ORGANIZED HEALTH CARE EDUCATION/TRAINING PROGRAM

## 2017-08-04 PROCEDURE — 83735 ASSAY OF MAGNESIUM: CPT

## 2017-08-04 PROCEDURE — A9500 TC99M SESTAMIBI: HCPCS | Performed by: INTERNAL MEDICINE

## 2017-08-04 PROCEDURE — 81050 URINALYSIS VOLUME MEASURE: CPT

## 2017-08-04 PROCEDURE — 2580000003 HC RX 258: Performed by: STUDENT IN AN ORGANIZED HEALTH CARE EDUCATION/TRAINING PROGRAM

## 2017-08-04 PROCEDURE — 3430000000 HC RX DIAGNOSTIC RADIOPHARMACEUTICAL: Performed by: INTERNAL MEDICINE

## 2017-08-04 PROCEDURE — 6360000002 HC RX W HCPCS: Performed by: INTERNAL MEDICINE

## 2017-08-04 PROCEDURE — 70491 CT SOFT TISSUE NECK W/DYE: CPT

## 2017-08-04 PROCEDURE — 6370000000 HC RX 637 (ALT 250 FOR IP): Performed by: INTERNAL MEDICINE

## 2017-08-04 PROCEDURE — 78070 PARATHYROID PLANAR IMAGING: CPT

## 2017-08-04 PROCEDURE — 2580000003 HC RX 258: Performed by: INTERNAL MEDICINE

## 2017-08-04 PROCEDURE — 82340 ASSAY OF CALCIUM IN URINE: CPT

## 2017-08-04 PROCEDURE — 82570 ASSAY OF URINE CREATININE: CPT

## 2017-08-04 PROCEDURE — 36415 COLL VENOUS BLD VENIPUNCTURE: CPT

## 2017-08-04 RX ORDER — CALCITONIN SALMON 200 [USP'U]/ML
300 INJECTION, SOLUTION INTRAMUSCULAR; SUBCUTANEOUS 2 TIMES DAILY
Status: COMPLETED | OUTPATIENT
Start: 2017-08-04 | End: 2017-08-05

## 2017-08-04 RX ORDER — 0.9 % SODIUM CHLORIDE 0.9 %
100 INTRAVENOUS SOLUTION INTRAVENOUS ONCE
Status: COMPLETED | OUTPATIENT
Start: 2017-08-04 | End: 2017-08-04

## 2017-08-04 RX ORDER — SENNA PLUS 8.6 MG/1
1 TABLET ORAL NIGHTLY
Status: DISCONTINUED | OUTPATIENT
Start: 2017-08-04 | End: 2017-08-07 | Stop reason: HOSPADM

## 2017-08-04 RX ORDER — SODIUM CHLORIDE 0.9 % (FLUSH) 0.9 %
10 SYRINGE (ML) INJECTION PRN
Status: DISCONTINUED | OUTPATIENT
Start: 2017-08-04 | End: 2017-08-05

## 2017-08-04 RX ORDER — MAGNESIUM SULFATE 1 G/100ML
1 INJECTION INTRAVENOUS PRN
Status: DISCONTINUED | OUTPATIENT
Start: 2017-08-04 | End: 2017-08-07 | Stop reason: HOSPADM

## 2017-08-04 RX ORDER — HYDROCODONE BITARTRATE AND ACETAMINOPHEN 5; 325 MG/1; MG/1
1 TABLET ORAL EVERY 6 HOURS PRN
Status: DISCONTINUED | OUTPATIENT
Start: 2017-08-04 | End: 2017-08-07 | Stop reason: HOSPADM

## 2017-08-04 RX ADMIN — SODIUM CHLORIDE 100 ML: 9 INJECTION, SOLUTION INTRAVENOUS at 16:47

## 2017-08-04 RX ADMIN — SODIUM CHLORIDE: 9 INJECTION, SOLUTION INTRAVENOUS at 04:32

## 2017-08-04 RX ADMIN — MORPHINE SULFATE 2 MG: 4 INJECTION, SOLUTION INTRAMUSCULAR; INTRAVENOUS at 00:07

## 2017-08-04 RX ADMIN — SODIUM CHLORIDE: 9 INJECTION, SOLUTION INTRAVENOUS at 00:04

## 2017-08-04 RX ADMIN — TETRAKIS(2-METHOXYISOBUTYLISOCYANIDE)COPPER(I) TETRAFLUOROBORATE 21.2 MILLICURIE: 1 INJECTION, POWDER, LYOPHILIZED, FOR SOLUTION INTRAVENOUS at 08:35

## 2017-08-04 RX ADMIN — POTASSIUM CHLORIDE 40 MEQ: 20 TABLET, EXTENDED RELEASE ORAL at 10:35

## 2017-08-04 RX ADMIN — SODIUM PHOSPHATE, MONOBASIC, MONOHYDRATE 14.94 MMOL: 276; 142 INJECTION, SOLUTION INTRAVENOUS at 10:19

## 2017-08-04 RX ADMIN — PROPRANOLOL HYDROCHLORIDE 80 MG: 80 CAPSULE, EXTENDED RELEASE ORAL at 21:05

## 2017-08-04 RX ADMIN — SODIUM PHOSPHATE, MONOBASIC, MONOHYDRATE 29.88 MMOL: 276; 142 INJECTION, SOLUTION INTRAVENOUS at 20:53

## 2017-08-04 RX ADMIN — MAGNESIUM SULFATE HEPTAHYDRATE 1 G: 1 INJECTION, SOLUTION INTRAVENOUS at 18:46

## 2017-08-04 RX ADMIN — LEVOTHYROXINE SODIUM 250 MCG: 125 TABLET ORAL at 20:57

## 2017-08-04 RX ADMIN — PANTOPRAZOLE SODIUM 40 MG: 40 TABLET, DELAYED RELEASE ORAL at 04:31

## 2017-08-04 RX ADMIN — Medication 10 ML: at 20:57

## 2017-08-04 RX ADMIN — HYDROCODONE BITARTRATE AND ACETAMINOPHEN 1 TABLET: 5; 325 TABLET ORAL at 16:10

## 2017-08-04 RX ADMIN — Medication 300 UNITS: at 10:19

## 2017-08-04 RX ADMIN — MAGNESIUM SULFATE HEPTAHYDRATE 1 G: 1 INJECTION, SOLUTION INTRAVENOUS at 17:39

## 2017-08-04 RX ADMIN — MAGNESIUM SULFATE HEPTAHYDRATE 1 G: 1 INJECTION, SOLUTION INTRAVENOUS at 16:10

## 2017-08-04 RX ADMIN — MAGNESIUM HYDROXIDE 30 ML: 400 SUSPENSION ORAL at 13:18

## 2017-08-04 RX ADMIN — AMLODIPINE BESYLATE 2.5 MG: 2.5 TABLET ORAL at 10:19

## 2017-08-04 RX ADMIN — MORPHINE SULFATE 2 MG: 4 INJECTION, SOLUTION INTRAMUSCULAR; INTRAVENOUS at 20:59

## 2017-08-04 RX ADMIN — ENOXAPARIN SODIUM 40 MG: 40 INJECTION SUBCUTANEOUS at 10:19

## 2017-08-04 RX ADMIN — MOMETASONE FUROATE AND FORMOTEROL FUMARATE DIHYDRATE 2 PUFF: 100; 5 AEROSOL RESPIRATORY (INHALATION) at 20:57

## 2017-08-04 RX ADMIN — ONDANSETRON 4 MG: 2 INJECTION INTRAMUSCULAR; INTRAVENOUS at 13:19

## 2017-08-04 RX ADMIN — Medication 10 ML: at 16:48

## 2017-08-04 RX ADMIN — IOVERSOL 70 ML: 741 INJECTION INTRA-ARTERIAL; INTRAVENOUS at 16:47

## 2017-08-04 RX ADMIN — SENNOSIDES 8.6 MG: 8.6 TABLET, FILM COATED ORAL at 20:57

## 2017-08-04 ASSESSMENT — PAIN SCALES - GENERAL
PAINLEVEL_OUTOF10: 4
PAINLEVEL_OUTOF10: 9
PAINLEVEL_OUTOF10: 5
PAINLEVEL_OUTOF10: 9
PAINLEVEL_OUTOF10: 5
PAINLEVEL_OUTOF10: 7

## 2017-08-04 ASSESSMENT — PAIN DESCRIPTION - PAIN TYPE
TYPE: ACUTE PAIN
TYPE: ACUTE PAIN

## 2017-08-04 ASSESSMENT — PAIN DESCRIPTION - DESCRIPTORS
DESCRIPTORS: ACHING
DESCRIPTORS: ACHING

## 2017-08-04 ASSESSMENT — PAIN DESCRIPTION - LOCATION
LOCATION: HEAD
LOCATION: HEAD

## 2017-08-05 PROBLEM — E21.3 HYPERPARATHYROIDISM (HCC): Status: ACTIVE | Noted: 2017-08-05

## 2017-08-05 LAB
ABSOLUTE EOS #: 0.1 K/UL (ref 0–0.4)
ABSOLUTE LYMPH #: 1.6 K/UL (ref 1–4.8)
ABSOLUTE MONO #: 0.5 K/UL (ref 0.1–1.3)
ANION GAP SERPL CALCULATED.3IONS-SCNC: 11 MMOL/L (ref 9–17)
ANION GAP SERPL CALCULATED.3IONS-SCNC: 12 MMOL/L (ref 9–17)
BASOPHILS # BLD: 1 %
BASOPHILS ABSOLUTE: 0 K/UL (ref 0–0.2)
BUN BLDV-MCNC: 6 MG/DL (ref 6–20)
BUN/CREAT BLD: ABNORMAL (ref 9–20)
CALCIUM SERPL-MCNC: 11.1 MG/DL (ref 8.6–10.4)
CHLORIDE BLD-SCNC: 101 MMOL/L (ref 98–107)
CHLORIDE BLD-SCNC: 102 MMOL/L (ref 98–107)
CO2: 25 MMOL/L (ref 20–31)
CO2: 26 MMOL/L (ref 20–31)
CREAT SERPL-MCNC: 0.5 MG/DL (ref 0.5–0.9)
DIFFERENTIAL TYPE: ABNORMAL
EOSINOPHILS RELATIVE PERCENT: 2 %
GFR AFRICAN AMERICAN: >60 ML/MIN
GFR NON-AFRICAN AMERICAN: >60 ML/MIN
GFR SERPL CREATININE-BSD FRML MDRD: ABNORMAL ML/MIN/{1.73_M2}
GFR SERPL CREATININE-BSD FRML MDRD: ABNORMAL ML/MIN/{1.73_M2}
GLUCOSE BLD-MCNC: 115 MG/DL (ref 70–99)
HCT VFR BLD CALC: 37.8 % (ref 36–46)
HEMOGLOBIN: 13.1 G/DL (ref 12–16)
LYMPHOCYTES # BLD: 29 %
MAGNESIUM: 1.9 MG/DL (ref 1.6–2.6)
MAGNESIUM: 2 MG/DL (ref 1.6–2.6)
MCH RBC QN AUTO: 32.8 PG (ref 26–34)
MCHC RBC AUTO-ENTMCNC: 34.6 G/DL (ref 31–37)
MCV RBC AUTO: 94.8 FL (ref 80–100)
MONOCYTES # BLD: 8 %
PDW BLD-RTO: 12 % (ref 11.5–14.9)
PHOSPHORUS: 1.5 MG/DL (ref 2.6–4.5)
PHOSPHORUS: 1.8 MG/DL (ref 2.6–4.5)
PHOSPHORUS: NORMAL MG/DL (ref 2.6–4.5)
PLATELET # BLD: 141 K/UL (ref 150–450)
PLATELET ESTIMATE: ABNORMAL
PMV BLD AUTO: 9.1 FL (ref 6–12)
POTASSIUM SERPL-SCNC: 3.6 MMOL/L (ref 3.7–5.3)
POTASSIUM SERPL-SCNC: 3.9 MMOL/L (ref 3.7–5.3)
RBC # BLD: 3.99 M/UL (ref 4–5.2)
RBC # BLD: ABNORMAL 10*6/UL
SEG NEUTROPHILS: 60 %
SEGMENTED NEUTROPHILS ABSOLUTE COUNT: 3.3 K/UL (ref 1.3–9.1)
SODIUM BLD-SCNC: 138 MMOL/L (ref 135–144)
SODIUM BLD-SCNC: 139 MMOL/L (ref 135–144)
VITAMIN D 1,25-DIHYDROXY: 69.3 PG/ML (ref 19.9–79.3)
WBC # BLD: 5.5 K/UL (ref 3.5–11)
WBC # BLD: ABNORMAL 10*3/UL

## 2017-08-05 PROCEDURE — 2500000003 HC RX 250 WO HCPCS: Performed by: STUDENT IN AN ORGANIZED HEALTH CARE EDUCATION/TRAINING PROGRAM

## 2017-08-05 PROCEDURE — 84100 ASSAY OF PHOSPHORUS: CPT

## 2017-08-05 PROCEDURE — 6360000002 HC RX W HCPCS: Performed by: STUDENT IN AN ORGANIZED HEALTH CARE EDUCATION/TRAINING PROGRAM

## 2017-08-05 PROCEDURE — 2580000003 HC RX 258: Performed by: STUDENT IN AN ORGANIZED HEALTH CARE EDUCATION/TRAINING PROGRAM

## 2017-08-05 PROCEDURE — 80048 BASIC METABOLIC PNL TOTAL CA: CPT

## 2017-08-05 PROCEDURE — 83519 RIA NONANTIBODY: CPT

## 2017-08-05 PROCEDURE — 36415 COLL VENOUS BLD VENIPUNCTURE: CPT

## 2017-08-05 PROCEDURE — 85025 COMPLETE CBC W/AUTO DIFF WBC: CPT

## 2017-08-05 PROCEDURE — 83735 ASSAY OF MAGNESIUM: CPT

## 2017-08-05 PROCEDURE — 6370000000 HC RX 637 (ALT 250 FOR IP): Performed by: INTERNAL MEDICINE

## 2017-08-05 PROCEDURE — 2580000003 HC RX 258: Performed by: INTERNAL MEDICINE

## 2017-08-05 PROCEDURE — 6370000000 HC RX 637 (ALT 250 FOR IP): Performed by: STUDENT IN AN ORGANIZED HEALTH CARE EDUCATION/TRAINING PROGRAM

## 2017-08-05 PROCEDURE — 2060000000 HC ICU INTERMEDIATE R&B

## 2017-08-05 PROCEDURE — 6360000002 HC RX W HCPCS: Performed by: INTERNAL MEDICINE

## 2017-08-05 PROCEDURE — 80051 ELECTROLYTE PANEL: CPT

## 2017-08-05 RX ORDER — CINACALCET 30 MG/1
30 TABLET, FILM COATED ORAL DAILY
Status: DISCONTINUED | OUTPATIENT
Start: 2017-08-05 | End: 2017-08-07 | Stop reason: HOSPADM

## 2017-08-05 RX ADMIN — CINACALCET HYDROCHLORIDE 30 MG: 30 TABLET, COATED ORAL at 15:35

## 2017-08-05 RX ADMIN — DIBASIC SODIUM PHOSPHATE, MONOBASIC POTASSIUM PHOSPHATE AND MONOBASIC SODIUM PHOSPHATE 1 TABLET: 852; 155; 130 TABLET ORAL at 21:44

## 2017-08-05 RX ADMIN — Medication 300 UNITS: at 15:24

## 2017-08-05 RX ADMIN — PROPRANOLOL HYDROCHLORIDE 80 MG: 80 CAPSULE, EXTENDED RELEASE ORAL at 21:21

## 2017-08-05 RX ADMIN — Medication 300 UNITS: at 00:04

## 2017-08-05 RX ADMIN — MOMETASONE FUROATE AND FORMOTEROL FUMARATE DIHYDRATE 2 PUFF: 100; 5 AEROSOL RESPIRATORY (INHALATION) at 21:22

## 2017-08-05 RX ADMIN — LEVOTHYROXINE SODIUM 125 MCG: 125 TABLET ORAL at 21:21

## 2017-08-05 RX ADMIN — SENNOSIDES 8.6 MG: 8.6 TABLET, FILM COATED ORAL at 21:21

## 2017-08-05 RX ADMIN — ENOXAPARIN SODIUM 40 MG: 40 INJECTION SUBCUTANEOUS at 10:44

## 2017-08-05 RX ADMIN — Medication 300 UNITS: at 23:24

## 2017-08-05 RX ADMIN — SODIUM CHLORIDE: 9 INJECTION, SOLUTION INTRAVENOUS at 12:57

## 2017-08-05 RX ADMIN — SODIUM PHOSPHATE, MONOBASIC, MONOHYDRATE 14.94 MMOL: 276; 142 INJECTION, SOLUTION INTRAVENOUS at 15:25

## 2017-08-05 RX ADMIN — PANTOPRAZOLE SODIUM 40 MG: 40 TABLET, DELAYED RELEASE ORAL at 15:35

## 2017-08-05 RX ADMIN — AMLODIPINE BESYLATE 2.5 MG: 2.5 TABLET ORAL at 10:44

## 2017-08-05 RX ADMIN — Medication 10 ML: at 21:22

## 2017-08-06 LAB
ABSOLUTE EOS #: 0.14 K/UL (ref 0–0.4)
ABSOLUTE LYMPH #: 3.26 K/UL (ref 1–4.8)
ABSOLUTE MONO #: 0.54 K/UL (ref 0.1–1.3)
ANION GAP SERPL CALCULATED.3IONS-SCNC: 10 MMOL/L (ref 9–17)
BASOPHILS # BLD: 1 %
BASOPHILS ABSOLUTE: 0.07 K/UL (ref 0–0.2)
BUN BLDV-MCNC: 5 MG/DL (ref 6–20)
BUN/CREAT BLD: ABNORMAL (ref 9–20)
CALCIUM SERPL-MCNC: 10 MG/DL (ref 8.6–10.4)
CHLORIDE BLD-SCNC: 105 MMOL/L (ref 98–107)
CO2: 22 MMOL/L (ref 20–31)
CREAT SERPL-MCNC: <0.4 MG/DL (ref 0.5–0.9)
DIFFERENTIAL TYPE: ABNORMAL
EOSINOPHILS RELATIVE PERCENT: 2 %
GFR AFRICAN AMERICAN: ABNORMAL ML/MIN
GFR NON-AFRICAN AMERICAN: ABNORMAL ML/MIN
GFR SERPL CREATININE-BSD FRML MDRD: ABNORMAL ML/MIN/{1.73_M2}
GFR SERPL CREATININE-BSD FRML MDRD: ABNORMAL ML/MIN/{1.73_M2}
GLUCOSE BLD-MCNC: 114 MG/DL (ref 70–99)
HCT VFR BLD CALC: 36.7 % (ref 36–46)
HEMOGLOBIN: 12.7 G/DL (ref 12–16)
LYMPHOCYTES # BLD: 48 %
MCH RBC QN AUTO: 33.2 PG (ref 26–34)
MCHC RBC AUTO-ENTMCNC: 34.7 G/DL (ref 31–37)
MCV RBC AUTO: 95.7 FL (ref 80–100)
MONOCYTES # BLD: 8 %
MORPHOLOGY: NORMAL
PDW BLD-RTO: 11.9 % (ref 11.5–14.9)
PHOSPHORUS: 1.2 MG/DL (ref 2.6–4.5)
PHOSPHORUS: 1.6 MG/DL (ref 2.6–4.5)
PHOSPHORUS: 2.2 MG/DL (ref 2.6–4.5)
PLATELET # BLD: 151 K/UL (ref 150–450)
PLATELET ESTIMATE: ABNORMAL
PMV BLD AUTO: 9.5 FL (ref 6–12)
POTASSIUM SERPL-SCNC: 3.8 MMOL/L (ref 3.7–5.3)
RBC # BLD: 3.84 M/UL (ref 4–5.2)
RBC # BLD: ABNORMAL 10*6/UL
SEG NEUTROPHILS: 41 %
SEGMENTED NEUTROPHILS ABSOLUTE COUNT: 2.79 K/UL (ref 1.3–9.1)
SODIUM BLD-SCNC: 137 MMOL/L (ref 135–144)
WBC # BLD: 6.8 K/UL (ref 3.5–11)
WBC # BLD: ABNORMAL 10*3/UL

## 2017-08-06 PROCEDURE — 85025 COMPLETE CBC W/AUTO DIFF WBC: CPT

## 2017-08-06 PROCEDURE — 80048 BASIC METABOLIC PNL TOTAL CA: CPT

## 2017-08-06 PROCEDURE — 84100 ASSAY OF PHOSPHORUS: CPT

## 2017-08-06 PROCEDURE — 2060000000 HC ICU INTERMEDIATE R&B

## 2017-08-06 PROCEDURE — 99233 SBSQ HOSP IP/OBS HIGH 50: CPT | Performed by: INTERNAL MEDICINE

## 2017-08-06 PROCEDURE — 6360000002 HC RX W HCPCS: Performed by: STUDENT IN AN ORGANIZED HEALTH CARE EDUCATION/TRAINING PROGRAM

## 2017-08-06 PROCEDURE — 6370000000 HC RX 637 (ALT 250 FOR IP): Performed by: STUDENT IN AN ORGANIZED HEALTH CARE EDUCATION/TRAINING PROGRAM

## 2017-08-06 PROCEDURE — 6370000000 HC RX 637 (ALT 250 FOR IP): Performed by: INTERNAL MEDICINE

## 2017-08-06 PROCEDURE — 2580000003 HC RX 258: Performed by: STUDENT IN AN ORGANIZED HEALTH CARE EDUCATION/TRAINING PROGRAM

## 2017-08-06 PROCEDURE — 36415 COLL VENOUS BLD VENIPUNCTURE: CPT

## 2017-08-06 PROCEDURE — 2500000003 HC RX 250 WO HCPCS: Performed by: STUDENT IN AN ORGANIZED HEALTH CARE EDUCATION/TRAINING PROGRAM

## 2017-08-06 PROCEDURE — 2580000003 HC RX 258: Performed by: INTERNAL MEDICINE

## 2017-08-06 RX ORDER — BENZONATATE 100 MG/1
100 CAPSULE ORAL 3 TIMES DAILY PRN
Status: DISCONTINUED | OUTPATIENT
Start: 2017-08-06 | End: 2017-08-06

## 2017-08-06 RX ADMIN — SODIUM PHOSPHATE, MONOBASIC, MONOHYDRATE 14.94 MMOL: 276; 142 INJECTION, SOLUTION INTRAVENOUS at 13:09

## 2017-08-06 RX ADMIN — LEVOTHYROXINE SODIUM 250 MCG: 125 TABLET ORAL at 21:38

## 2017-08-06 RX ADMIN — SODIUM PHOSPHATE, MONOBASIC, MONOHYDRATE 14.94 MMOL: 276; 142 INJECTION, SOLUTION INTRAVENOUS at 05:48

## 2017-08-06 RX ADMIN — DIBASIC SODIUM PHOSPHATE, MONOBASIC POTASSIUM PHOSPHATE AND MONOBASIC SODIUM PHOSPHATE 1 TABLET: 852; 155; 130 TABLET ORAL at 21:25

## 2017-08-06 RX ADMIN — SODIUM CHLORIDE: 9 INJECTION, SOLUTION INTRAVENOUS at 21:38

## 2017-08-06 RX ADMIN — MORPHINE SULFATE 2 MG: 4 INJECTION, SOLUTION INTRAMUSCULAR; INTRAVENOUS at 21:33

## 2017-08-06 RX ADMIN — SENNOSIDES 8.6 MG: 8.6 TABLET, FILM COATED ORAL at 21:26

## 2017-08-06 RX ADMIN — SODIUM PHOSPHATE, MONOBASIC, MONOHYDRATE 29.88 MMOL: 276; 142 INJECTION, SOLUTION INTRAVENOUS at 22:00

## 2017-08-06 RX ADMIN — CINACALCET HYDROCHLORIDE 30 MG: 30 TABLET, COATED ORAL at 09:22

## 2017-08-06 RX ADMIN — AMLODIPINE BESYLATE 2.5 MG: 2.5 TABLET ORAL at 09:19

## 2017-08-06 RX ADMIN — PROPRANOLOL HYDROCHLORIDE 80 MG: 80 CAPSULE, EXTENDED RELEASE ORAL at 21:26

## 2017-08-06 RX ADMIN — DIBASIC SODIUM PHOSPHATE, MONOBASIC POTASSIUM PHOSPHATE AND MONOBASIC SODIUM PHOSPHATE 1 TABLET: 852; 155; 130 TABLET ORAL at 09:22

## 2017-08-06 RX ADMIN — PANTOPRAZOLE SODIUM 40 MG: 40 TABLET, DELAYED RELEASE ORAL at 09:20

## 2017-08-06 ASSESSMENT — PAIN DESCRIPTION - PAIN TYPE: TYPE: ACUTE PAIN

## 2017-08-06 ASSESSMENT — PAIN DESCRIPTION - LOCATION: LOCATION: FOOT

## 2017-08-06 ASSESSMENT — PAIN SCALES - GENERAL
PAINLEVEL_OUTOF10: 7
PAINLEVEL_OUTOF10: 3

## 2017-08-07 VITALS
TEMPERATURE: 97.8 F | HEART RATE: 71 BPM | OXYGEN SATURATION: 100 % | WEIGHT: 206 LBS | RESPIRATION RATE: 18 BRPM | SYSTOLIC BLOOD PRESSURE: 111 MMHG | BODY MASS INDEX: 37.91 KG/M2 | DIASTOLIC BLOOD PRESSURE: 69 MMHG | HEIGHT: 62 IN

## 2017-08-07 LAB
ABSOLUTE EOS #: 0.45 K/UL (ref 0–0.4)
ABSOLUTE LYMPH #: 3.69 K/UL (ref 1–4.8)
ABSOLUTE MONO #: 0.72 K/UL (ref 0.1–1.3)
ANION GAP SERPL CALCULATED.3IONS-SCNC: 13 MMOL/L (ref 9–17)
BASOPHILS # BLD: 0 %
BASOPHILS ABSOLUTE: 0 K/UL (ref 0–0.2)
BUN BLDV-MCNC: 5 MG/DL (ref 6–20)
BUN/CREAT BLD: ABNORMAL (ref 9–20)
CALCIUM SERPL-MCNC: 9.8 MG/DL (ref 8.6–10.4)
CHLORIDE BLD-SCNC: 103 MMOL/L (ref 98–107)
CO2: 22 MMOL/L (ref 20–31)
CREAT SERPL-MCNC: 0.43 MG/DL (ref 0.5–0.9)
DIFFERENTIAL TYPE: ABNORMAL
EOSINOPHILS RELATIVE PERCENT: 5 %
GFR AFRICAN AMERICAN: >60 ML/MIN
GFR NON-AFRICAN AMERICAN: >60 ML/MIN
GFR SERPL CREATININE-BSD FRML MDRD: ABNORMAL ML/MIN/{1.73_M2}
GFR SERPL CREATININE-BSD FRML MDRD: ABNORMAL ML/MIN/{1.73_M2}
GLUCOSE BLD-MCNC: 124 MG/DL (ref 70–99)
HCT VFR BLD CALC: 38.2 % (ref 36–46)
HEMOGLOBIN: 13.2 G/DL (ref 12–16)
LYMPHOCYTES # BLD: 41 %
MCH RBC QN AUTO: 33 PG (ref 26–34)
MCHC RBC AUTO-ENTMCNC: 34.5 G/DL (ref 31–37)
MCV RBC AUTO: 95.7 FL (ref 80–100)
MONOCYTES # BLD: 8 %
MORPHOLOGY: NORMAL
PDW BLD-RTO: 12.2 % (ref 11.5–14.9)
PHOSPHORUS: 2.4 MG/DL (ref 2.6–4.5)
PLATELET # BLD: 191 K/UL (ref 150–450)
PLATELET ESTIMATE: ABNORMAL
PMV BLD AUTO: 9.6 FL (ref 6–12)
POTASSIUM SERPL-SCNC: 3.6 MMOL/L (ref 3.7–5.3)
RBC # BLD: 3.99 M/UL (ref 4–5.2)
RBC # BLD: ABNORMAL 10*6/UL
SEG NEUTROPHILS: 46 %
SEGMENTED NEUTROPHILS ABSOLUTE COUNT: 4.14 K/UL (ref 1.3–9.1)
SODIUM BLD-SCNC: 138 MMOL/L (ref 135–144)
WBC # BLD: 9 K/UL (ref 3.5–11)
WBC # BLD: ABNORMAL 10*3/UL

## 2017-08-07 PROCEDURE — 99239 HOSP IP/OBS DSCHRG MGMT >30: CPT | Performed by: INTERNAL MEDICINE

## 2017-08-07 PROCEDURE — 6370000000 HC RX 637 (ALT 250 FOR IP): Performed by: INTERNAL MEDICINE

## 2017-08-07 PROCEDURE — 6370000000 HC RX 637 (ALT 250 FOR IP): Performed by: STUDENT IN AN ORGANIZED HEALTH CARE EDUCATION/TRAINING PROGRAM

## 2017-08-07 PROCEDURE — 36415 COLL VENOUS BLD VENIPUNCTURE: CPT

## 2017-08-07 PROCEDURE — 80048 BASIC METABOLIC PNL TOTAL CA: CPT

## 2017-08-07 PROCEDURE — 85025 COMPLETE CBC W/AUTO DIFF WBC: CPT

## 2017-08-07 PROCEDURE — 2580000003 HC RX 258: Performed by: INTERNAL MEDICINE

## 2017-08-07 PROCEDURE — 84100 ASSAY OF PHOSPHORUS: CPT

## 2017-08-07 RX ORDER — AMLODIPINE BESYLATE 2.5 MG/1
2.5 TABLET ORAL DAILY
Qty: 30 TABLET | Refills: 2 | Status: SHIPPED | OUTPATIENT
Start: 2017-08-07 | End: 2017-08-14

## 2017-08-07 RX ORDER — CINACALCET 30 MG/1
30 TABLET, FILM COATED ORAL DAILY
Qty: 30 TABLET | Refills: 1 | Status: SHIPPED | OUTPATIENT
Start: 2017-08-07 | End: 2018-07-11 | Stop reason: ALTCHOICE

## 2017-08-07 RX ORDER — PROPRANOLOL HYDROCHLORIDE 80 MG/1
80 CAPSULE, EXTENDED RELEASE ORAL DAILY
Qty: 30 CAPSULE | Refills: 1 | Status: SHIPPED | OUTPATIENT
Start: 2017-08-07 | End: 2018-07-11 | Stop reason: ALTCHOICE

## 2017-08-07 RX ADMIN — PANTOPRAZOLE SODIUM 40 MG: 40 TABLET, DELAYED RELEASE ORAL at 05:46

## 2017-08-07 RX ADMIN — CINACALCET HYDROCHLORIDE 30 MG: 30 TABLET, COATED ORAL at 09:22

## 2017-08-07 RX ADMIN — DIBASIC SODIUM PHOSPHATE, MONOBASIC POTASSIUM PHOSPHATE AND MONOBASIC SODIUM PHOSPHATE 1 TABLET: 852; 155; 130 TABLET ORAL at 09:22

## 2017-08-07 RX ADMIN — AMLODIPINE BESYLATE 2.5 MG: 2.5 TABLET ORAL at 09:22

## 2017-08-07 RX ADMIN — SODIUM CHLORIDE: 9 INJECTION, SOLUTION INTRAVENOUS at 14:39

## 2017-08-07 ASSESSMENT — PAIN SCALES - GENERAL
PAINLEVEL_OUTOF10: 0
PAINLEVEL_OUTOF10: 3

## 2017-08-08 LAB
ALBUMIN (CALCULATED): 4.4 G/DL (ref 3.2–5.2)
ALBUMIN PERCENT: 64 % (ref 45–65)
ALPHA 1 PERCENT: 2 % (ref 3–6)
ALPHA 2 PERCENT: 7 % (ref 6–13)
ALPHA-1-GLOBULIN: 0.2 G/DL (ref 0.1–0.4)
ALPHA-2-GLOBULIN: 0.5 G/DL (ref 0.5–0.9)
BETA GLOBULIN: 0.9 G/DL (ref 0.5–1.1)
BETA PERCENT: 13 % (ref 11–19)
GAMMA GLOBULIN %: 14 % (ref 9–20)
GAMMA GLOBULIN: 1 G/DL (ref 0.5–1.5)
P E INTERPRETATION, U: NORMAL
PATHOLOGIST: ABNORMAL
PATHOLOGIST: NORMAL
PROTEIN ELECTROPHORESIS, SERUM: ABNORMAL
PTH RELATED PEPTIDE: <2 PMOL/L (ref 0–3.4)
SPECIMEN TYPE: NORMAL
TOTAL PROT. SUM,%: 100 % (ref 98–102)
TOTAL PROT. SUM: 7 G/DL (ref 6.3–8.2)
TOTAL PROTEIN: 6.9 G/DL (ref 6.4–8.3)
URINE TOTAL PROTEIN: 5 MG/DL

## 2017-08-10 ENCOUNTER — OFFICE VISIT (OUTPATIENT)
Dept: ORTHOPEDIC SURGERY | Age: 42
End: 2017-08-10

## 2017-08-10 DIAGNOSIS — S82.65XD CLOSED NONDISPLACED FRACTURE OF LATERAL MALLEOLUS OF LEFT FIBULA WITH ROUTINE HEALING, SUBSEQUENT ENCOUNTER: Primary | ICD-10-CM

## 2017-08-10 PROCEDURE — 99024 POSTOP FOLLOW-UP VISIT: CPT | Performed by: ORTHOPAEDIC SURGERY

## 2017-08-11 ENCOUNTER — HOSPITAL ENCOUNTER (OUTPATIENT)
Age: 42
Discharge: HOME | End: 2017-08-11
Payer: COMMERCIAL

## 2017-08-11 DIAGNOSIS — E83.52 HYPERCALCEMIA: ICD-10-CM

## 2017-08-11 LAB
ANION GAP SERPL CALCULATED.3IONS-SCNC: 14 MMOL/L (ref 9–17)
BUN BLDV-MCNC: 8 MG/DL (ref 6–20)
BUN/CREAT BLD: ABNORMAL (ref 9–20)
CALCIUM IONIZED: 1.4 MMOL/L (ref 1.13–1.33)
CALCIUM SERPL-MCNC: 10.1 MG/DL (ref 8.6–10.4)
CHLORIDE BLD-SCNC: 101 MMOL/L (ref 98–107)
CO2: 23 MMOL/L (ref 20–31)
CREAT SERPL-MCNC: 0.6 MG/DL (ref 0.5–0.9)
GFR AFRICAN AMERICAN: >60 ML/MIN
GFR NON-AFRICAN AMERICAN: >60 ML/MIN
GFR SERPL CREATININE-BSD FRML MDRD: ABNORMAL ML/MIN/{1.73_M2}
GFR SERPL CREATININE-BSD FRML MDRD: ABNORMAL ML/MIN/{1.73_M2}
GLUCOSE BLD-MCNC: 197 MG/DL (ref 70–99)
POTASSIUM SERPL-SCNC: 4.2 MMOL/L (ref 3.7–5.3)
SODIUM BLD-SCNC: 138 MMOL/L (ref 135–144)

## 2017-08-11 PROCEDURE — 36415 COLL VENOUS BLD VENIPUNCTURE: CPT

## 2017-08-11 PROCEDURE — 82330 ASSAY OF CALCIUM: CPT

## 2017-08-11 PROCEDURE — 80048 BASIC METABOLIC PNL TOTAL CA: CPT

## 2017-08-12 LAB — PTH RELATED PEPTIDE: <2 PMOL/L (ref 0–3.4)

## 2017-08-14 ENCOUNTER — OFFICE VISIT (OUTPATIENT)
Dept: INTERNAL MEDICINE CLINIC | Age: 42
End: 2017-08-14
Payer: COMMERCIAL

## 2017-08-14 VITALS
DIASTOLIC BLOOD PRESSURE: 60 MMHG | HEART RATE: 75 BPM | TEMPERATURE: 97.8 F | WEIGHT: 216 LBS | OXYGEN SATURATION: 96 % | SYSTOLIC BLOOD PRESSURE: 119 MMHG | BODY MASS INDEX: 39.75 KG/M2 | HEIGHT: 62 IN

## 2017-08-14 DIAGNOSIS — E21.3 HYPERPARATHYROIDISM (HCC): Primary | ICD-10-CM

## 2017-08-14 DIAGNOSIS — E83.39 HYPOPHOSPHATEMIA: ICD-10-CM

## 2017-08-14 DIAGNOSIS — F17.200 TOBACCO DEPENDENCE: ICD-10-CM

## 2017-08-14 DIAGNOSIS — E83.42 HYPOMAGNESEMIA: ICD-10-CM

## 2017-08-14 DIAGNOSIS — E83.52 HYPERCALCEMIA: ICD-10-CM

## 2017-08-14 PROCEDURE — 90471 IMMUNIZATION ADMIN: CPT | Performed by: NURSE PRACTITIONER

## 2017-08-14 PROCEDURE — 99214 OFFICE O/P EST MOD 30 MIN: CPT | Performed by: NURSE PRACTITIONER

## 2017-08-14 PROCEDURE — 90732 PPSV23 VACC 2 YRS+ SUBQ/IM: CPT | Performed by: NURSE PRACTITIONER

## 2017-08-14 ASSESSMENT — ENCOUNTER SYMPTOMS
VOMITING: 1
BLOOD IN STOOL: 0
ABDOMINAL PAIN: 0
NAUSEA: 1
SHORTNESS OF BREATH: 0
COUGH: 0
EYE DISCHARGE: 0

## 2017-08-15 ENCOUNTER — HOSPITAL ENCOUNTER (OUTPATIENT)
Age: 42
Discharge: HOME | End: 2017-08-15
Payer: COMMERCIAL

## 2017-08-15 DIAGNOSIS — N18.1 BENIGN HYPERTENSION WITH CKD (CHRONIC KIDNEY DISEASE) STAGE I: ICD-10-CM

## 2017-08-15 DIAGNOSIS — N18.1 CKD (CHRONIC KIDNEY DISEASE) STAGE 1, GFR 90 ML/MIN OR GREATER: ICD-10-CM

## 2017-08-15 DIAGNOSIS — E83.42 HYPOMAGNESEMIA: ICD-10-CM

## 2017-08-15 DIAGNOSIS — E83.39 HYPOPHOSPHATEMIA: ICD-10-CM

## 2017-08-15 DIAGNOSIS — E21.3 HYPERPARATHYROIDISM (HCC): ICD-10-CM

## 2017-08-15 DIAGNOSIS — I12.9 BENIGN HYPERTENSION WITH CKD (CHRONIC KIDNEY DISEASE) STAGE I: ICD-10-CM

## 2017-08-15 LAB
ABSOLUTE EOS #: 0.07 K/UL (ref 0–0.4)
ABSOLUTE LYMPH #: 3.11 K/UL (ref 1–4.8)
ABSOLUTE MONO #: 0.44 K/UL (ref 0.1–1.3)
ALBUMIN SERPL-MCNC: 3.8 G/DL (ref 3.5–5.2)
ALBUMIN/GLOBULIN RATIO: ABNORMAL (ref 1–2.5)
ALP BLD-CCNC: 224 U/L (ref 35–104)
ALT SERPL-CCNC: 93 U/L (ref 5–33)
ANION GAP SERPL CALCULATED.3IONS-SCNC: 14 MMOL/L (ref 9–17)
AST SERPL-CCNC: 69 U/L
BASOPHILS # BLD: 0 %
BASOPHILS ABSOLUTE: 0 K/UL (ref 0–0.2)
BILIRUB SERPL-MCNC: 0.3 MG/DL (ref 0.3–1.2)
BUN BLDV-MCNC: 9 MG/DL (ref 6–20)
BUN/CREAT BLD: ABNORMAL (ref 9–20)
CALCIUM SERPL-MCNC: 9.8 MG/DL (ref 8.6–10.4)
CHLORIDE BLD-SCNC: 102 MMOL/L (ref 98–107)
CO2: 22 MMOL/L (ref 20–31)
CREAT SERPL-MCNC: 0.47 MG/DL (ref 0.5–0.9)
DIFFERENTIAL TYPE: ABNORMAL
EOSINOPHILS RELATIVE PERCENT: 1 %
GFR AFRICAN AMERICAN: >60 ML/MIN
GFR NON-AFRICAN AMERICAN: >60 ML/MIN
GFR SERPL CREATININE-BSD FRML MDRD: ABNORMAL ML/MIN/{1.73_M2}
GFR SERPL CREATININE-BSD FRML MDRD: ABNORMAL ML/MIN/{1.73_M2}
GLUCOSE BLD-MCNC: 150 MG/DL (ref 70–99)
HCT VFR BLD CALC: 37.6 % (ref 36–46)
HEMOGLOBIN: 12.8 G/DL (ref 12–16)
LYMPHOCYTES # BLD: 42 %
MAGNESIUM: 1.9 MG/DL (ref 1.6–2.6)
MAGNESIUM: 2 MG/DL (ref 1.6–2.6)
MCH RBC QN AUTO: 32.8 PG (ref 26–34)
MCHC RBC AUTO-ENTMCNC: 34 G/DL (ref 31–37)
MCV RBC AUTO: 96.5 FL (ref 80–100)
MONOCYTES # BLD: 6 %
MORPHOLOGY: ABNORMAL
PDW BLD-RTO: 12.7 % (ref 11.5–14.9)
PHOSPHORUS: 2.1 MG/DL (ref 2.6–4.5)
PHOSPHORUS: 2.2 MG/DL (ref 2.6–4.5)
PLATELET # BLD: 244 K/UL (ref 150–450)
PLATELET ESTIMATE: ABNORMAL
PMV BLD AUTO: 8.8 FL (ref 6–12)
POTASSIUM SERPL-SCNC: 4.4 MMOL/L (ref 3.7–5.3)
RBC # BLD: 3.9 M/UL (ref 4–5.2)
RBC # BLD: ABNORMAL 10*6/UL
SEG NEUTROPHILS: 51 %
SEGMENTED NEUTROPHILS ABSOLUTE COUNT: 3.78 K/UL (ref 1.3–9.1)
SODIUM BLD-SCNC: 138 MMOL/L (ref 135–144)
TOTAL PROTEIN: 7.3 G/DL (ref 6.4–8.3)
WBC # BLD: 7.4 K/UL (ref 3.5–11)
WBC # BLD: ABNORMAL 10*3/UL

## 2017-08-15 PROCEDURE — 84100 ASSAY OF PHOSPHORUS: CPT

## 2017-08-15 PROCEDURE — 36415 COLL VENOUS BLD VENIPUNCTURE: CPT

## 2017-08-15 PROCEDURE — 83735 ASSAY OF MAGNESIUM: CPT

## 2017-08-15 PROCEDURE — 80053 COMPREHEN METABOLIC PANEL: CPT

## 2017-08-15 PROCEDURE — 85025 COMPLETE CBC W/AUTO DIFF WBC: CPT

## 2017-08-23 LAB
EKG ATRIAL RATE: 67 BPM
EKG ATRIAL RATE: 78 BPM
EKG P AXIS: 55 DEGREES
EKG P AXIS: 76 DEGREES
EKG P-R INTERVAL: 176 MS
EKG P-R INTERVAL: 180 MS
EKG Q-T INTERVAL: 362 MS
EKG Q-T INTERVAL: 392 MS
EKG QRS DURATION: 100 MS
EKG QRS DURATION: 104 MS
EKG QTC CALCULATION (BAZETT): 412 MS
EKG QTC CALCULATION (BAZETT): 414 MS
EKG R AXIS: 42 DEGREES
EKG R AXIS: 62 DEGREES
EKG T AXIS: 13 DEGREES
EKG T AXIS: 36 DEGREES
EKG VENTRICULAR RATE: 67 BPM
EKG VENTRICULAR RATE: 78 BPM

## 2017-08-28 ENCOUNTER — HOSPITAL ENCOUNTER (OUTPATIENT)
Age: 42
Discharge: HOME | End: 2017-08-28
Payer: COMMERCIAL

## 2017-08-28 DIAGNOSIS — E83.39 HYPOPHOSPHATEMIA: ICD-10-CM

## 2017-08-28 DIAGNOSIS — N18.1 BENIGN HYPERTENSION WITH CKD (CHRONIC KIDNEY DISEASE) STAGE I: ICD-10-CM

## 2017-08-28 DIAGNOSIS — N18.1 CKD (CHRONIC KIDNEY DISEASE) STAGE 1, GFR 90 ML/MIN OR GREATER: ICD-10-CM

## 2017-08-28 DIAGNOSIS — I12.9 BENIGN HYPERTENSION WITH CKD (CHRONIC KIDNEY DISEASE) STAGE I: ICD-10-CM

## 2017-08-28 LAB
ABSOLUTE EOS #: 0.3 K/UL (ref 0–0.4)
ABSOLUTE LYMPH #: 3.7 K/UL (ref 1–4.8)
ABSOLUTE MONO #: 0.4 K/UL (ref 0.1–1.3)
ALBUMIN SERPL-MCNC: 3.9 G/DL (ref 3.5–5.2)
ALBUMIN/GLOBULIN RATIO: ABNORMAL (ref 1–2.5)
ALP BLD-CCNC: 205 U/L (ref 35–104)
ALT SERPL-CCNC: 81 U/L (ref 5–33)
ANION GAP SERPL CALCULATED.3IONS-SCNC: 13 MMOL/L (ref 9–17)
AST SERPL-CCNC: 61 U/L
BASOPHILS # BLD: 1 %
BASOPHILS ABSOLUTE: 0.1 K/UL (ref 0–0.2)
BILIRUB SERPL-MCNC: 0.63 MG/DL (ref 0.3–1.2)
BUN BLDV-MCNC: 9 MG/DL (ref 6–20)
BUN/CREAT BLD: ABNORMAL (ref 9–20)
CALCIUM IONIZED: 1.33 MMOL/L (ref 1.13–1.33)
CALCIUM SERPL-MCNC: 9.3 MG/DL (ref 8.6–10.4)
CHLORIDE BLD-SCNC: 101 MMOL/L (ref 98–107)
CO2: 23 MMOL/L (ref 20–31)
CREAT SERPL-MCNC: <0.4 MG/DL (ref 0.5–0.9)
DIFFERENTIAL TYPE: NORMAL
EOSINOPHILS RELATIVE PERCENT: 3 %
GFR AFRICAN AMERICAN: ABNORMAL ML/MIN
GFR NON-AFRICAN AMERICAN: ABNORMAL ML/MIN
GFR SERPL CREATININE-BSD FRML MDRD: ABNORMAL ML/MIN/{1.73_M2}
GFR SERPL CREATININE-BSD FRML MDRD: ABNORMAL ML/MIN/{1.73_M2}
GLUCOSE BLD-MCNC: 198 MG/DL (ref 70–99)
HCT VFR BLD CALC: 39.9 % (ref 36–46)
HEMOGLOBIN: 13.6 G/DL (ref 12–16)
LYMPHOCYTES # BLD: 44 %
MAGNESIUM: 1.9 MG/DL (ref 1.6–2.6)
MCH RBC QN AUTO: 33 PG (ref 26–34)
MCHC RBC AUTO-ENTMCNC: 34 G/DL (ref 31–37)
MCV RBC AUTO: 97 FL (ref 80–100)
MONOCYTES # BLD: 5 %
PDW BLD-RTO: 12.6 % (ref 11.5–14.9)
PHOSPHORUS: 2.1 MG/DL (ref 2.6–4.5)
PLATELET # BLD: 212 K/UL (ref 150–450)
PLATELET ESTIMATE: NORMAL
PMV BLD AUTO: 9 FL (ref 6–12)
POTASSIUM SERPL-SCNC: 4.1 MMOL/L (ref 3.7–5.3)
PTH INTACT: 93.02 PG/ML (ref 15–65)
RBC # BLD: 4.12 M/UL (ref 4–5.2)
RBC # BLD: NORMAL 10*6/UL
SEG NEUTROPHILS: 47 %
SEGMENTED NEUTROPHILS ABSOLUTE COUNT: 4 K/UL (ref 1.3–9.1)
SODIUM BLD-SCNC: 137 MMOL/L (ref 135–144)
TOTAL PROTEIN: 7.2 G/DL (ref 6.4–8.3)
WBC # BLD: 8.5 K/UL (ref 3.5–11)
WBC # BLD: NORMAL 10*3/UL

## 2017-08-28 PROCEDURE — 85025 COMPLETE CBC W/AUTO DIFF WBC: CPT

## 2017-08-28 PROCEDURE — 83970 ASSAY OF PARATHORMONE: CPT

## 2017-08-28 PROCEDURE — 83735 ASSAY OF MAGNESIUM: CPT

## 2017-08-28 PROCEDURE — 84100 ASSAY OF PHOSPHORUS: CPT

## 2017-08-28 PROCEDURE — 36415 COLL VENOUS BLD VENIPUNCTURE: CPT

## 2017-08-28 PROCEDURE — 80053 COMPREHEN METABOLIC PANEL: CPT

## 2017-08-28 PROCEDURE — 82330 ASSAY OF CALCIUM: CPT

## 2017-09-13 ENCOUNTER — HOSPITAL ENCOUNTER (OUTPATIENT)
Age: 42
Discharge: HOME | End: 2017-09-13
Payer: COMMERCIAL

## 2017-09-13 DIAGNOSIS — I12.9 BENIGN HYPERTENSION WITH CKD (CHRONIC KIDNEY DISEASE) STAGE I: ICD-10-CM

## 2017-09-13 DIAGNOSIS — N18.1 BENIGN HYPERTENSION WITH CKD (CHRONIC KIDNEY DISEASE) STAGE I: ICD-10-CM

## 2017-09-13 DIAGNOSIS — N18.1 CKD (CHRONIC KIDNEY DISEASE) STAGE 1, GFR 90 ML/MIN OR GREATER: ICD-10-CM

## 2017-09-13 DIAGNOSIS — E83.39 HYPOPHOSPHATEMIA: ICD-10-CM

## 2017-09-13 LAB
CALCIUM IONIZED: 1.55 MMOL/L (ref 1.13–1.33)
PTH INTACT: 46.62 PG/ML (ref 15–65)

## 2017-09-13 PROCEDURE — 82330 ASSAY OF CALCIUM: CPT

## 2017-09-13 PROCEDURE — 36415 COLL VENOUS BLD VENIPUNCTURE: CPT

## 2017-09-13 PROCEDURE — 83970 ASSAY OF PARATHORMONE: CPT

## 2017-09-15 ENCOUNTER — HOSPITAL ENCOUNTER (OUTPATIENT)
Dept: PREADMISSION TESTING | Age: 42
Discharge: HOME | End: 2017-09-15
Payer: COMMERCIAL

## 2017-09-15 VITALS
HEIGHT: 63 IN | WEIGHT: 202 LBS | SYSTOLIC BLOOD PRESSURE: 112 MMHG | DIASTOLIC BLOOD PRESSURE: 70 MMHG | BODY MASS INDEX: 35.79 KG/M2 | TEMPERATURE: 97.7 F | OXYGEN SATURATION: 96 % | HEART RATE: 75 BPM | RESPIRATION RATE: 16 BRPM

## 2017-09-15 LAB
BUN BLDV-MCNC: 9 MG/DL (ref 6–20)
CREAT SERPL-MCNC: 0.4 MG/DL (ref 0.5–0.9)
GFR AFRICAN AMERICAN: >60 ML/MIN
GFR NON-AFRICAN AMERICAN: >60 ML/MIN
GFR SERPL CREATININE-BSD FRML MDRD: ABNORMAL ML/MIN/{1.73_M2}
GFR SERPL CREATININE-BSD FRML MDRD: ABNORMAL ML/MIN/{1.73_M2}
GLUCOSE BLD-MCNC: 113 MG/DL (ref 70–99)

## 2017-09-15 PROCEDURE — 82565 ASSAY OF CREATININE: CPT

## 2017-09-15 PROCEDURE — 82947 ASSAY GLUCOSE BLOOD QUANT: CPT

## 2017-09-15 PROCEDURE — 36415 COLL VENOUS BLD VENIPUNCTURE: CPT

## 2017-09-15 PROCEDURE — 84520 ASSAY OF UREA NITROGEN: CPT

## 2017-09-15 RX ORDER — SODIUM CHLORIDE 9 MG/ML
INJECTION, SOLUTION INTRAVENOUS CONTINUOUS
Status: CANCELLED | OUTPATIENT
Start: 2017-09-15

## 2017-09-28 ENCOUNTER — ANESTHESIA EVENT (OUTPATIENT)
Dept: OPERATING ROOM | Age: 42
End: 2017-09-28
Payer: COMMERCIAL

## 2017-09-29 ENCOUNTER — HOSPITAL ENCOUNTER (OUTPATIENT)
Age: 42
Setting detail: OUTPATIENT SURGERY
Discharge: HOME | End: 2017-09-29
Attending: OTOLARYNGOLOGY | Admitting: OTOLARYNGOLOGY
Payer: COMMERCIAL

## 2017-09-29 ENCOUNTER — ANESTHESIA (OUTPATIENT)
Dept: OPERATING ROOM | Age: 42
End: 2017-09-29
Payer: COMMERCIAL

## 2017-09-29 VITALS
HEART RATE: 83 BPM | BODY MASS INDEX: 34.55 KG/M2 | SYSTOLIC BLOOD PRESSURE: 94 MMHG | TEMPERATURE: 98.1 F | OXYGEN SATURATION: 96 % | WEIGHT: 215 LBS | RESPIRATION RATE: 17 BRPM | DIASTOLIC BLOOD PRESSURE: 59 MMHG | HEIGHT: 66 IN

## 2017-09-29 VITALS — DIASTOLIC BLOOD PRESSURE: 113 MMHG | SYSTOLIC BLOOD PRESSURE: 147 MMHG | OXYGEN SATURATION: 90 % | TEMPERATURE: 97.4 F

## 2017-09-29 LAB
CALCIUM IONIZED: 1.42 MMOL/L (ref 1.13–1.33)
PTH INTACT: 11.14 PG/ML (ref 15–65)
PTH INTACT: 71.63 PG/ML (ref 15–65)

## 2017-09-29 PROCEDURE — 3600000004 HC SURGERY LEVEL 4 BASE: Performed by: OTOLARYNGOLOGY

## 2017-09-29 PROCEDURE — 6370000000 HC RX 637 (ALT 250 FOR IP): Performed by: ANESTHESIOLOGY

## 2017-09-29 PROCEDURE — 3600000014 HC SURGERY LEVEL 4 ADDTL 15MIN: Performed by: OTOLARYNGOLOGY

## 2017-09-29 PROCEDURE — 6360000002 HC RX W HCPCS

## 2017-09-29 PROCEDURE — 7100000000 HC PACU RECOVERY - FIRST 15 MIN: Performed by: OTOLARYNGOLOGY

## 2017-09-29 PROCEDURE — 2500000003 HC RX 250 WO HCPCS: Performed by: ANESTHESIOLOGY

## 2017-09-29 PROCEDURE — 2580000003 HC RX 258: Performed by: ANESTHESIOLOGY

## 2017-09-29 PROCEDURE — 2580000003 HC RX 258: Performed by: SPECIALIST

## 2017-09-29 PROCEDURE — A6257 TRANSPARENT FILM <= 16 SQ IN: HCPCS | Performed by: OTOLARYNGOLOGY

## 2017-09-29 PROCEDURE — 7100000011 HC PHASE II RECOVERY - ADDTL 15 MIN: Performed by: OTOLARYNGOLOGY

## 2017-09-29 PROCEDURE — 7100000001 HC PACU RECOVERY - ADDTL 15 MIN: Performed by: OTOLARYNGOLOGY

## 2017-09-29 PROCEDURE — 88331 PATH CONSLTJ SURG 1 BLK 1SPC: CPT

## 2017-09-29 PROCEDURE — 3700000001 HC ADD 15 MINUTES (ANESTHESIA): Performed by: OTOLARYNGOLOGY

## 2017-09-29 PROCEDURE — 82330 ASSAY OF CALCIUM: CPT

## 2017-09-29 PROCEDURE — 83970 ASSAY OF PARATHORMONE: CPT

## 2017-09-29 PROCEDURE — 6360000002 HC RX W HCPCS: Performed by: ANESTHESIOLOGY

## 2017-09-29 PROCEDURE — 7100000010 HC PHASE II RECOVERY - FIRST 15 MIN: Performed by: OTOLARYNGOLOGY

## 2017-09-29 PROCEDURE — 6360000002 HC RX W HCPCS: Performed by: SPECIALIST

## 2017-09-29 PROCEDURE — 3700000000 HC ANESTHESIA ATTENDED CARE: Performed by: OTOLARYNGOLOGY

## 2017-09-29 PROCEDURE — 2580000003 HC RX 258: Performed by: OTOLARYNGOLOGY

## 2017-09-29 PROCEDURE — 88305 TISSUE EXAM BY PATHOLOGIST: CPT

## 2017-09-29 PROCEDURE — 2500000003 HC RX 250 WO HCPCS: Performed by: SPECIALIST

## 2017-09-29 RX ORDER — LIDOCAINE HYDROCHLORIDE 10 MG/ML
1 INJECTION, SOLUTION EPIDURAL; INFILTRATION; INTRACAUDAL; PERINEURAL
Status: COMPLETED | OUTPATIENT
Start: 2017-09-29 | End: 2017-09-29

## 2017-09-29 RX ORDER — PROPOFOL 10 MG/ML
INJECTION, EMULSION INTRAVENOUS PRN
Status: DISCONTINUED | OUTPATIENT
Start: 2017-09-29 | End: 2017-09-29 | Stop reason: SDUPTHER

## 2017-09-29 RX ORDER — SODIUM CHLORIDE, SODIUM LACTATE, POTASSIUM CHLORIDE, CALCIUM CHLORIDE 600; 310; 30; 20 MG/100ML; MG/100ML; MG/100ML; MG/100ML
INJECTION, SOLUTION INTRAVENOUS CONTINUOUS
Status: DISCONTINUED | OUTPATIENT
Start: 2017-09-29 | End: 2017-09-29 | Stop reason: HOSPADM

## 2017-09-29 RX ORDER — IPRATROPIUM BROMIDE AND ALBUTEROL SULFATE 2.5; .5 MG/3ML; MG/3ML
1 SOLUTION RESPIRATORY (INHALATION)
Status: DISCONTINUED | OUTPATIENT
Start: 2017-09-29 | End: 2017-09-29 | Stop reason: HOSPADM

## 2017-09-29 RX ORDER — SODIUM CHLORIDE 0.9 % (FLUSH) 0.9 %
10 SYRINGE (ML) INJECTION PRN
Status: DISCONTINUED | OUTPATIENT
Start: 2017-09-29 | End: 2017-09-29 | Stop reason: HOSPADM

## 2017-09-29 RX ORDER — GLYCOPYRROLATE 0.2 MG/ML
INJECTION INTRAMUSCULAR; INTRAVENOUS PRN
Status: DISCONTINUED | OUTPATIENT
Start: 2017-09-29 | End: 2017-09-29 | Stop reason: SDUPTHER

## 2017-09-29 RX ORDER — LABETALOL HYDROCHLORIDE 5 MG/ML
5 INJECTION, SOLUTION INTRAVENOUS EVERY 10 MIN PRN
Status: DISCONTINUED | OUTPATIENT
Start: 2017-09-29 | End: 2017-09-29 | Stop reason: HOSPADM

## 2017-09-29 RX ORDER — OXYCODONE HYDROCHLORIDE AND ACETAMINOPHEN 5; 325 MG/1; MG/1
1 TABLET ORAL EVERY 6 HOURS PRN
Qty: 24 TABLET | Refills: 0 | Status: SHIPPED | OUTPATIENT
Start: 2017-09-29 | End: 2017-10-06

## 2017-09-29 RX ORDER — MAGNESIUM HYDROXIDE 1200 MG/15ML
LIQUID ORAL CONTINUOUS PRN
Status: DISCONTINUED | OUTPATIENT
Start: 2017-09-29 | End: 2017-09-29 | Stop reason: HOSPADM

## 2017-09-29 RX ORDER — OXYCODONE HYDROCHLORIDE AND ACETAMINOPHEN 5; 325 MG/1; MG/1
1 TABLET ORAL PRN
Status: COMPLETED | OUTPATIENT
Start: 2017-09-29 | End: 2017-09-29

## 2017-09-29 RX ORDER — FENTANYL CITRATE 50 UG/ML
25 INJECTION, SOLUTION INTRAMUSCULAR; INTRAVENOUS EVERY 5 MIN PRN
Status: DISCONTINUED | OUTPATIENT
Start: 2017-09-29 | End: 2017-09-29 | Stop reason: HOSPADM

## 2017-09-29 RX ORDER — SODIUM CHLORIDE 0.9 % (FLUSH) 0.9 %
10 SYRINGE (ML) INJECTION EVERY 12 HOURS SCHEDULED
Status: DISCONTINUED | OUTPATIENT
Start: 2017-09-29 | End: 2017-09-29 | Stop reason: HOSPADM

## 2017-09-29 RX ORDER — OXYCODONE HYDROCHLORIDE AND ACETAMINOPHEN 5; 325 MG/1; MG/1
2 TABLET ORAL PRN
Status: COMPLETED | OUTPATIENT
Start: 2017-09-29 | End: 2017-09-29

## 2017-09-29 RX ORDER — ROCURONIUM BROMIDE 10 MG/ML
INJECTION, SOLUTION INTRAVENOUS PRN
Status: DISCONTINUED | OUTPATIENT
Start: 2017-09-29 | End: 2017-09-29 | Stop reason: SDUPTHER

## 2017-09-29 RX ORDER — DIPHENHYDRAMINE HYDROCHLORIDE 50 MG/ML
12.5 INJECTION INTRAMUSCULAR; INTRAVENOUS
Status: DISCONTINUED | OUTPATIENT
Start: 2017-09-29 | End: 2017-09-29 | Stop reason: HOSPADM

## 2017-09-29 RX ORDER — FENTANYL CITRATE 50 UG/ML
INJECTION, SOLUTION INTRAMUSCULAR; INTRAVENOUS PRN
Status: DISCONTINUED | OUTPATIENT
Start: 2017-09-29 | End: 2017-09-29 | Stop reason: SDUPTHER

## 2017-09-29 RX ORDER — DEXAMETHASONE SODIUM PHOSPHATE 10 MG/ML
INJECTION INTRAMUSCULAR; INTRAVENOUS PRN
Status: DISCONTINUED | OUTPATIENT
Start: 2017-09-29 | End: 2017-09-29 | Stop reason: SDUPTHER

## 2017-09-29 RX ORDER — MORPHINE SULFATE 2 MG/ML
2 INJECTION, SOLUTION INTRAMUSCULAR; INTRAVENOUS EVERY 5 MIN PRN
Status: DISCONTINUED | OUTPATIENT
Start: 2017-09-29 | End: 2017-09-29 | Stop reason: HOSPADM

## 2017-09-29 RX ORDER — ONDANSETRON 2 MG/ML
4 INJECTION INTRAMUSCULAR; INTRAVENOUS
Status: COMPLETED | OUTPATIENT
Start: 2017-09-29 | End: 2017-09-29

## 2017-09-29 RX ORDER — SODIUM CHLORIDE, SODIUM LACTATE, POTASSIUM CHLORIDE, CALCIUM CHLORIDE 600; 310; 30; 20 MG/100ML; MG/100ML; MG/100ML; MG/100ML
INJECTION, SOLUTION INTRAVENOUS CONTINUOUS PRN
Status: DISCONTINUED | OUTPATIENT
Start: 2017-09-29 | End: 2017-09-29 | Stop reason: SDUPTHER

## 2017-09-29 RX ORDER — MIDAZOLAM HYDROCHLORIDE 1 MG/ML
INJECTION INTRAMUSCULAR; INTRAVENOUS PRN
Status: DISCONTINUED | OUTPATIENT
Start: 2017-09-29 | End: 2017-09-29 | Stop reason: SDUPTHER

## 2017-09-29 RX ORDER — SODIUM CHLORIDE 9 MG/ML
INJECTION, SOLUTION INTRAVENOUS CONTINUOUS
Status: DISCONTINUED | OUTPATIENT
Start: 2017-09-29 | End: 2017-09-29 | Stop reason: HOSPADM

## 2017-09-29 RX ORDER — LIDOCAINE HYDROCHLORIDE 10 MG/ML
INJECTION, SOLUTION INFILTRATION; PERINEURAL PRN
Status: DISCONTINUED | OUTPATIENT
Start: 2017-09-29 | End: 2017-09-29 | Stop reason: SDUPTHER

## 2017-09-29 RX ADMIN — DEXAMETHASONE SODIUM PHOSPHATE 10 MG: 10 INJECTION INTRAMUSCULAR; INTRAVENOUS at 09:32

## 2017-09-29 RX ADMIN — FENTANYL CITRATE 100 MCG: 50 INJECTION INTRAMUSCULAR; INTRAVENOUS at 09:41

## 2017-09-29 RX ADMIN — LIDOCAINE HYDROCHLORIDE 0.4 ML: 10 INJECTION, SOLUTION INFILTRATION; PERINEURAL at 07:38

## 2017-09-29 RX ADMIN — OXYCODONE HYDROCHLORIDE AND ACETAMINOPHEN 1 TABLET: 5; 325 TABLET ORAL at 14:19

## 2017-09-29 RX ADMIN — FENTANYL CITRATE 100 MCG: 50 INJECTION INTRAMUSCULAR; INTRAVENOUS at 09:12

## 2017-09-29 RX ADMIN — ALBUTEROL SULFATE 2.5 MG: 2.5 SOLUTION RESPIRATORY (INHALATION) at 11:15

## 2017-09-29 RX ADMIN — FENTANYL CITRATE 25 MCG: 50 INJECTION, SOLUTION INTRAMUSCULAR; INTRAVENOUS at 12:06

## 2017-09-29 RX ADMIN — SODIUM CHLORIDE, POTASSIUM CHLORIDE, SODIUM LACTATE AND CALCIUM CHLORIDE: 600; 310; 30; 20 INJECTION, SOLUTION INTRAVENOUS at 07:37

## 2017-09-29 RX ADMIN — MIDAZOLAM HYDROCHLORIDE 2 MG: 1 INJECTION, SOLUTION INTRAMUSCULAR; INTRAVENOUS at 09:09

## 2017-09-29 RX ADMIN — ROCURONIUM BROMIDE 40 MG: 10 INJECTION INTRAVENOUS at 09:12

## 2017-09-29 RX ADMIN — SODIUM CHLORIDE, POTASSIUM CHLORIDE, SODIUM LACTATE AND CALCIUM CHLORIDE: 600; 310; 30; 20 INJECTION, SOLUTION INTRAVENOUS at 09:05

## 2017-09-29 RX ADMIN — PROPOFOL 200 MG: 10 INJECTION, EMULSION INTRAVENOUS at 09:12

## 2017-09-29 RX ADMIN — LABETALOL HYDROCHLORIDE 5 MG: 5 INJECTION, SOLUTION INTRAVENOUS at 09:41

## 2017-09-29 RX ADMIN — NEOSTIGMINE METHYLSULFATE 4 MG: 1 INJECTION, SOLUTION INTRAMUSCULAR; INTRAVENOUS; SUBCUTANEOUS at 10:31

## 2017-09-29 RX ADMIN — LIDOCAINE HYDROCHLORIDE 50 MG: 10 INJECTION, SOLUTION INFILTRATION; PERINEURAL at 09:12

## 2017-09-29 RX ADMIN — ONDANSETRON 4 MG: 2 INJECTION INTRAMUSCULAR; INTRAVENOUS at 09:32

## 2017-09-29 RX ADMIN — GLYCOPYRROLATE 0.8 MG: 0.2 INJECTION, SOLUTION INTRAMUSCULAR; INTRAVENOUS at 10:31

## 2017-09-29 RX ADMIN — Medication 2 G: at 09:31

## 2017-09-29 ASSESSMENT — PAIN SCALES - GENERAL
PAINLEVEL_OUTOF10: 7
PAINLEVEL_OUTOF10: 5
PAINLEVEL_OUTOF10: 0
PAINLEVEL_OUTOF10: 5
PAINLEVEL_OUTOF10: 5
PAINLEVEL_OUTOF10: 8
PAINLEVEL_OUTOF10: 6
PAINLEVEL_OUTOF10: 9
PAINLEVEL_OUTOF10: 0
PAINLEVEL_OUTOF10: 7
PAINLEVEL_OUTOF10: 0
PAINLEVEL_OUTOF10: 8
PAINLEVEL_OUTOF10: 7
PAINLEVEL_OUTOF10: 7
PAINLEVEL_OUTOF10: 6
PAINLEVEL_OUTOF10: 5

## 2017-09-29 ASSESSMENT — PAIN DESCRIPTION - DESCRIPTORS: DESCRIPTORS: HEADACHE

## 2017-09-29 ASSESSMENT — PAIN - FUNCTIONAL ASSESSMENT: PAIN_FUNCTIONAL_ASSESSMENT: 0-10

## 2017-09-29 NOTE — IP AVS SNAPSHOT
Patient Information     Patient Name MAGDIEL Griggs 1975         This is your updated medication list to keep with you all times      TAKE these medications     albuterol-ipratropium  MCG/ACT Aers inhaler   Commonly known as:  COMBIVENT RESPIMAT   Inhale 1 puff into the lungs every 6 hours       cinacalcet 30 MG tablet   Commonly known as:  SENSIPAR   Take 1 tablet by mouth daily       levothyroxine 125 MCG tablet   Commonly known as:  SYNTHROID   Take 2 tablets by mouth Daily       LIVALO 2 MG Tabs tablet   Generic drug:  pitavastatin       metoclopramide 10 MG tablet   Commonly known as:  REGLAN   Take 1 tablet by mouth 4 times daily       mometasone-formoterol 100-5 MCG/ACT inhaler   Commonly known as:  DULERA   Inhale 2 puffs into the lungs 2 times daily       omeprazole 20 MG delayed release capsule   Commonly known as:  PRILOSEC   Take 1 capsule by mouth daily       oxyCODONE-acetaminophen 5-325 MG per tablet   Commonly known as:  PERCOCET   Take 1 tablet by mouth every 6 hours as needed for Pain . phosphorus 155-852-130 MG tablet   Commonly known as:  K PHOS NEUTRAL   Take 1 tablet by mouth 2 times daily       propranolol 80 MG extended release capsule   Commonly known as:  INDERAL LA   Take 1 capsule by mouth daily       Roflumilast 500 MCG tablet   Commonly known as:  DALIRESP   Take 500 mcg by mouth daily. * VENTOLIN  (90 Base) MCG/ACT inhaler   Generic drug:  albuterol sulfate HFA   INHALE 2 PUFFS BY MOUTH EVERY 6 HOURS AS NEEDED FOR WHEEZING       * albuterol (2.5 MG/3ML) 0.083% nebulizer solution   Commonly known as:  PROVENTIL   Take 3 mLs by nebulization every 4 hours as needed for Wheezing       * Notice: This list has 2 medication(s) that are the same as other medications prescribed for you. Read the directions carefully, and ask your doctor or other care provider to review them with you.

## 2017-09-29 NOTE — H&P
History and Physical Update    Pt Name: Carmen Boyce  MRN: [[id number]]  YOB: 1975  Date of evaluation: 9/29/2017    [x] I have examined the patient and reviewed the H&P/Consult and there are no changes to the patient or plans.     [] I have examined the patient and reviewed the H&P/Consult and have noted the following changes:        Fredy Byrnes [facility]  electronically signed 9/29/2017 at 7:53 AM

## 2017-09-29 NOTE — IP AVS SNAPSHOT
After Visit Summary  (Discharge Instructions)    Medication List for Home    Based on the information you provided to us as well as any changes during this visit, the following is your updated medication list.  Compare this with your prescription bottles at home. If you have any questions or concerns, contact your primary care physician's office. Daily Medication List (This medication list can be shared with any healthcare provider who is helping you manage your medications)      There are NEW medications for you. START taking them after you leave the hospital        Last Dose    Next Dose Due AM NOON PM NIGHT    oxyCODONE-acetaminophen 5-325 MG per tablet   Commonly known as:  PERCOCET   Take 1 tablet by mouth every 6 hours as needed for Pain . 1 tablet on 9/29/2017  2:19 PM                              You told us you were taking these medications at home, but the amount or how often you take this medication has CHANGED        Last Dose    Next Dose Due AM NOON PM NIGHT    omeprazole 20 MG delayed release capsule   Commonly known as:  PRILOSEC   Take 1 capsule by mouth daily   What changed:  additional instructions                                           These are medications you told us you were taking at home, CONTINUE taking them after you leave the hospital        Last Dose    Next Dose Due AM NOON PM NIGHT    albuterol-ipratropium  MCG/ACT Aers inhaler   Commonly known as:  COMBIVENT RESPIMAT   Inhale 1 puff into the lungs every 6 hours                                         cinacalcet 30 MG tablet   Commonly known as:  SENSIPAR   Take 1 tablet by mouth daily                                         levothyroxine 125 MCG tablet   Commonly known as:  SYNTHROID   Take 2 tablets by mouth Daily                                         LIVALO 2 MG Tabs tablet   Generic drug:  pitavastatin   Take 2 mg by mouth nightly. metoclopramide 10 MG tablet   Commonly known as:  REGLAN   Take 1 tablet by mouth 4 times daily                                         mometasone-formoterol 100-5 MCG/ACT inhaler   Commonly known as:  DULERA   Inhale 2 puffs into the lungs 2 times daily                                         phosphorus 155-852-130 MG tablet   Commonly known as:  K PHOS NEUTRAL   Take 1 tablet by mouth 2 times daily                                         propranolol 80 MG extended release capsule   Commonly known as:  INDERAL LA   Take 1 capsule by mouth daily                                         Roflumilast 500 MCG tablet   Commonly known as:  DALIRESP   Take 500 mcg by mouth daily. VENTOLIN  (90 Base) MCG/ACT inhaler   Generic drug:  albuterol sulfate HFA   INHALE 2 PUFFS BY MOUTH EVERY 6 HOURS AS NEEDED FOR WHEEZING                                         albuterol (2.5 MG/3ML) 0.083% nebulizer solution   Commonly known as:  PROVENTIL   Take 3 mLs by nebulization every 4 hours as needed for Wheezing                                              Where to Get Your Medications      You can get these medications from any pharmacy     Bring a paper prescription for each of these medications     oxyCODONE-acetaminophen 5-325 MG per tablet               Allergies as of 9/29/2017        Reactions    Atorvastatin Hives    Cymbalta [Duloxetine Hcl] Hives    Lipitor Hives    Tape [Adhesive Tape] Hives, Other (See Comments)    blister      Immunizations as of 9/29/2017     Name Date Dose VIS Date Route    Influenza Virus Vaccine 8/8/2012 0.25 ml 7/26/2011 Intramuscular    Comment: 49616-818-29 ndc    Pneumococcal Polysaccharide (Bnnlxevpo80) 8/14/2017 0.5 mL 4/24/2015 Intramuscular      Last Vitals          Most Recent Value    Temp  98.1 °F (36.7 °C)    Pulse  83    Resp  17    BP  (!)  94/59         After Visit Summary    This summary was created for you. Thank you for entrusting your care to us. The following information includes details about your hospital/visit stay along with steps you should take to help with your recovery once you leave the hospital.  In this packet, you will find information about the topics listed below:    · Instructions about your medications including a list of your home medications  · A summary of your hospital visit  · Follow-up appointments once you have left the hospital  · Your care plan at home      You may receive a survey regarding the care you received during your stay. Your input is valuable to us. We encourage you to complete and return your survey in the envelope provided. We hope you will choose us in the future for your healthcare needs. Patient Information     Patient Name MAGDIEL Tadeo 1975      Care Provided at:     Name Address Phone       [facility] [address] [id number] [phone]            Your Visit    Here you will find information about your visit, including the reason for your visit. Please take this sheet with you when you visit your doctor or other health care provider in the future. It will help determine the best possible medical care for you at that time. If you have any questions once you leave the hospital, please call the department phone number listed below. Why you were here     Your primary diagnosis was:  Not on File      Visit Information     Date & Time Provider Department Dept. Phone    2017 MD PAL FloresVZ [phone]       Follow-up Appointments    Below is a list of your follow-up and future appointments. This may not be a complete list as you may have made appointments directly with providers that we are not aware of or your providers may have made some for you. Please call your providers to confirm appointments. It is important to keep your appointments.  Please bring your current

## 2017-10-02 LAB — SURGICAL PATHOLOGY REPORT: NORMAL

## 2017-12-05 ENCOUNTER — OFFICE VISIT (OUTPATIENT)
Dept: INTERNAL MEDICINE CLINIC | Age: 42
End: 2017-12-05
Payer: COMMERCIAL

## 2017-12-05 VITALS
HEIGHT: 66 IN | OXYGEN SATURATION: 95 % | BODY MASS INDEX: 35.2 KG/M2 | DIASTOLIC BLOOD PRESSURE: 86 MMHG | WEIGHT: 219 LBS | SYSTOLIC BLOOD PRESSURE: 132 MMHG | HEART RATE: 96 BPM

## 2017-12-05 DIAGNOSIS — J41.0 SIMPLE CHRONIC BRONCHITIS (HCC): ICD-10-CM

## 2017-12-05 DIAGNOSIS — J44.1 COPD EXACERBATION (HCC): Primary | ICD-10-CM

## 2017-12-05 DIAGNOSIS — E66.01 MORBIDLY OBESE (HCC): ICD-10-CM

## 2017-12-05 DIAGNOSIS — I10 ESSENTIAL HYPERTENSION: ICD-10-CM

## 2017-12-05 DIAGNOSIS — E03.9 ACQUIRED HYPOTHYROIDISM: ICD-10-CM

## 2017-12-05 PROCEDURE — 99214 OFFICE O/P EST MOD 30 MIN: CPT | Performed by: INTERNAL MEDICINE

## 2017-12-05 RX ORDER — PREDNISONE 20 MG/1
20 TABLET ORAL 2 TIMES DAILY
Qty: 10 TABLET | Refills: 0 | Status: SHIPPED | OUTPATIENT
Start: 2017-12-05 | End: 2017-12-10

## 2017-12-05 RX ORDER — AZITHROMYCIN 250 MG/1
TABLET, FILM COATED ORAL
Qty: 6 TABLET | Refills: 0 | Status: SHIPPED | OUTPATIENT
Start: 2017-12-05 | End: 2017-12-15

## 2017-12-05 ASSESSMENT — ENCOUNTER SYMPTOMS
EYE DISCHARGE: 0
BLOOD IN STOOL: 0
EYE PAIN: 0
ABDOMINAL DISTENTION: 0
DIARRHEA: 0
SHORTNESS OF BREATH: 1
TROUBLE SWALLOWING: 0
WHEEZING: 1
COUGH: 1
COLOR CHANGE: 0

## 2017-12-05 NOTE — PROGRESS NOTES
Subjective:      Visit Information    Have you changed or started any medications since your last visit including any over-the-counter medicines, vitamins, or herbal medicines? no   Have you stopped taking any of your medications? Is so, why? -  no  Are you having any side effects from any of your medications? - no    Have you seen any other physician or provider since your last visit?  no   Have you had any other diagnostic tests since your last visit?  no   Have you been seen in the emergency room and/or had an admission in a hospital since we last saw you?  no   Have you had your routine dental cleaning in the past 6 months?  no     Do you have an active MyChart account? If no, what is the barrier? Yes    Patient Care Team:  Toshia Adame MD as PCP - General (Internal Medicine)  Pam Zimmerman MD as Consulting Physician (Gastroenterology)    Medical History Review  Past Medical, Family, and Social History reviewed and does not contribute to the patient presenting condition    Health Maintenance   Topic Date Due    HIV screen  07/01/1988    DTaP/Tdap/Td vaccine (1 - Tdap) 07/01/1992    Cervical cancer screen  07/01/1994    Flu vaccine (1) 02/28/2018 (Originally 9/1/2017)    Breast cancer screen  03/03/2018    Diabetes screen  05/20/2019    Lipid screen  08/13/2019    Pneumococcal med risk  Completed             Patient ID: Samir Pastrana is a 38 y.o. female. COPD: Patient complains of dyspnea. Symptoms began 7 days ago. Symptoms acute dyspnea does worsen with exertion. Sputum is clear in small amounts. Fever has been low grade fevers. Patient uses 0 pillows at night. Patient can walk 10 feet before resting. Patient currently is not on home oxygen therapy. Álvaro Joseph  Respiratory history: asthma  HTN  Onset more than 2 years ago  rian mild to mod  Controlled with current po meds  Not associated with headaches or blurry vision  No chest pain          Review of Systems   Constitutional: Negative for appetite change, diaphoresis and fatigue. HENT: Negative for ear discharge and trouble swallowing. Eyes: Negative for pain and discharge. Respiratory: Positive for cough, shortness of breath and wheezing. Cardiovascular: Negative for chest pain and palpitations. Gastrointestinal: Negative for abdominal distention, blood in stool and diarrhea. Endocrine: Negative for polydipsia and polyphagia. Genitourinary: Negative for difficulty urinating and frequency. Musculoskeletal: Negative for gait problem, myalgias and neck pain. Skin: Negative for color change and rash. Allergic/Immunologic: Negative for environmental allergies and food allergies. Neurological: Negative for dizziness and headaches. Hematological: Negative for adenopathy. Does not bruise/bleed easily. Psychiatric/Behavioral: Negative for behavioral problems and sleep disturbance. Objective:   Physical Exam   Constitutional: She is oriented to person, place, and time. She appears well-developed and well-nourished. Morbid obese   HENT:   Head: Normocephalic and atraumatic. Eyes: Conjunctivae and EOM are normal. Right eye exhibits no discharge. Left eye exhibits no discharge. Right conjunctiva is not injected. Left conjunctiva is not injected. Right eye exhibits normal extraocular motion. Left eye exhibits normal extraocular motion. Neck: Normal range of motion. Neck supple. No JVD present. No edema and no erythema present. No thyroid mass and no thyromegaly present. Cardiovascular: Normal rate and regular rhythm. Exam reveals no friction rub. No murmur heard. Pulmonary/Chest: Effort normal. No accessory muscle usage. No tachypnea and no bradypnea. No respiratory distress. She has wheezes. She has no rales. Abdominal: Soft. Bowel sounds are normal. She exhibits no distension. There is no tenderness. There is no rebound. Musculoskeletal: Normal range of motion. She exhibits no edema or tenderness.    Lymphadenopathy: bronchitis ([address]     Tobacco dependence     Hypothyroidism     Closed nondisplaced fracture of lateral malleolus of left fibula     Hypercalcemia     Hyperparathyroidism (HCC)     CKD (chronic kidney disease) stage 1, GFR 90 ml/min or greater     Hypophosphatemia     Hypomagnesemia     Past Medical History:   Diagnosis Date    Arthritis     OA    Asthma     Back pain, chronic 10/22/2014    Bright red rectal bleeding     CKD (chronic kidney disease) stage 1, GFR 90 ml/min or greater     COPD (chronic obstructive pulmonary disease) (HCC)     CVA (cerebral infarction) 1997    Rt. side numbness & weakness    Depression     Diverticulosis     DVT (deep venous thrombosis) (HCC)     multiple- in Pregnancy    Full dentures     Upper only    GERD (gastroesophageal reflux disease)     HDL lipoprotein deficiency     Hiatal hernia     HTN (hypertension)     Hx of blood clots 2011    PE    Hypercalcemia     Hyperlipemia     Hyperparathyroidism ([address]     Hypomagnesemia     Hypophosphatemia     Leaky heart valve     Liver fatty degeneration     MDRO (multiple drug resistant organisms) resistance 1/2015    MRSA from spider bite    Pulmonary valve disease     Scoliosis     Sleep apnea     no machine    Thyroid disease     hypothryoid    Tobacco dependence     Varicose vein of leg       Past Surgical History:   Procedure Laterality Date    CARDIAC CATHETERIZATION      CARPAL TUNNEL RELEASE Bilateral     CHOLECYSTECTOMY      COLONOSCOPY  2009 or 2010    FINGER TRIGGER RELEASE Bilateral     x 2    HYSTERECTOMY  2005    PLANTAR FASCIA SURGERY Bilateral     THYROIDECTOMY N/A 9/29/2017    PARATHYROIDECTOMY WITH NIM MONITOR performed by Leandra Marhc MD at [address] [place] [address]  2004    VARICOSE VEIN SURGERY      VENA CAVA FILTER PLACEMENT  01/26/2011 of placement     vena tech vena cava filter placed by Dr Andrés Banerjee at [facility]      Family History   Problem Relation Age of Onset    High Blood Pressure Mother     Depression Mother     Colon Cancer Mother       in     Breast Cancer Mother     Thyroid Disease Mother     Heart Disease Father      ruptured aortic aneurysm    High Blood Pressure Father     Diabetes Father     Depression Father     Arthritis Father     Depression Sister     High Blood Pressure Brother     Colon Cancer Maternal Uncle     Other Maternal Grandfather      AAA, aneurysm in brain    Thyroid Disease Maternal Grandmother     High Blood Pressure Maternal Grandmother     Seizures Paternal Grandmother     High Blood Pressure Paternal Grandmother     Stroke Brother     Other Brother      Kyphoplasty     Current Outpatient Prescriptions   Medication Sig Dispense Refill    predniSONE (DELTASONE) 20 MG tablet Take 1 tablet by mouth 2 times daily for 5 days 10 tablet 0    azithromycin (ZITHROMAX) 250 MG tablet 2 tablets by mouth day one, then 1 tablet daily by mouth for 4 more days 6 tablet 0    albuterol-ipratropium (COMBIVENT RESPIMAT)  MCG/ACT AERS inhaler Inhale 1 puff into the lungs every 6 hours 4 g 9    propranolol (INDERAL LA) 80 MG extended release capsule Take 1 capsule by mouth daily 30 capsule 1    phosphorus (K PHOS NEUTRAL) 155-852-130 MG tablet Take 1 tablet by mouth 2 times daily 60 tablet 3    cinacalcet (SENSIPAR) 30 MG tablet Take 1 tablet by mouth daily 30 tablet 1    metoclopramide (REGLAN) 10 MG tablet Take 1 tablet by mouth 4 times daily 120 tablet 3    omeprazole (PRILOSEC) 20 MG delayed release capsule Take 1 capsule by mouth daily (Patient taking differently: Take 20 mg by mouth daily Not filled yet, new RX) 30 capsule 3    levothyroxine (SYNTHROID) 125 MCG tablet Take 2 tablets by mouth Daily 60 tablet 3    albuterol (PROVENTIL) (2.5 MG/3ML) 0.083% nebulizer solution Take 3 mLs by nebulization every 4 hours as needed for Wheezing 300 mL 9    mometasone-formoterol (DULERA) 100-5 MCG/ACT inhaler Inhale 2 puffs into the lungs Acquired hypothyroidism     3. Essential hypertension     4. Morbidly obese ([facility]     5. Simple chronic bronchitis (HCC)  albuterol-ipratropium (COMBIVENT RESPIMAT)  MCG/ACT AERS inhaler           Plan:      No Follow-up on file. No orders of the defined types were placed in this encounter.     Orders Placed This Encounter   Medications    predniSONE (DELTASONE) 20 MG tablet     Sig: Take 1 tablet by mouth 2 times daily for 5 days     Dispense:  10 tablet     Refill:  0    azithromycin (ZITHROMAX) 250 MG tablet     Si tablets by mouth day one, then 1 tablet daily by mouth for 4 more days     Dispense:  6 tablet     Refill:  0    albuterol-ipratropium (COMBIVENT RESPIMAT)  MCG/ACT AERS inhaler     Sig: Inhale 1 puff into the lungs every 6 hours     Dispense:  4 g     Refill:  9

## 2018-01-24 ENCOUNTER — TELEPHONE (OUTPATIENT)
Dept: INTERNAL MEDICINE CLINIC | Age: 43
End: 2018-01-24

## 2018-01-24 NOTE — LETTER
[facility]  [address] [place] [address]  Phone: [phone]  Fax: [phone]    Miller Cranker, MD        January 24, 2018     Patient: Collin Velasquez   YOB: 1975   Date of Visit: 1/24/2018       To Whom It May Concern: It is my medical opinion that Michelle Esteves requires a disability parking placard for the following reasons:  She has limited walking ability due to respiratory condition. Duration of need: 2 months    If you have any questions or concerns, please don't hesitate to call.     Sincerely,        Maliha Scott, CNP

## 2018-02-16 ENCOUNTER — APPOINTMENT (OUTPATIENT)
Dept: GENERAL RADIOLOGY | Age: 43
End: 2018-02-16
Payer: COMMERCIAL

## 2018-02-16 ENCOUNTER — HOSPITAL ENCOUNTER (EMERGENCY)
Age: 43
Discharge: HOME | End: 2018-02-16
Attending: EMERGENCY MEDICINE
Payer: COMMERCIAL

## 2018-02-16 VITALS
WEIGHT: 210 LBS | BODY MASS INDEX: 38.64 KG/M2 | SYSTOLIC BLOOD PRESSURE: 152 MMHG | HEART RATE: 101 BPM | HEIGHT: 62 IN | RESPIRATION RATE: 16 BRPM | TEMPERATURE: 98.1 F | DIASTOLIC BLOOD PRESSURE: 90 MMHG | OXYGEN SATURATION: 100 %

## 2018-02-16 DIAGNOSIS — M25.461 KNEE EFFUSION, RIGHT: ICD-10-CM

## 2018-02-16 DIAGNOSIS — M25.561 ACUTE PAIN OF RIGHT KNEE: Primary | ICD-10-CM

## 2018-02-16 PROCEDURE — 73562 X-RAY EXAM OF KNEE 3: CPT

## 2018-02-16 PROCEDURE — 99283 EMERGENCY DEPT VISIT LOW MDM: CPT

## 2018-02-16 PROCEDURE — 6370000000 HC RX 637 (ALT 250 FOR IP): Performed by: NURSE PRACTITIONER

## 2018-02-16 RX ORDER — NAPROXEN 500 MG/1
500 TABLET ORAL 2 TIMES DAILY
Qty: 20 TABLET | Refills: 0 | Status: SHIPPED | OUTPATIENT
Start: 2018-02-16 | End: 2019-03-15

## 2018-02-16 RX ORDER — HYDROCODONE BITARTRATE AND ACETAMINOPHEN 5; 325 MG/1; MG/1
2 TABLET ORAL ONCE
Status: COMPLETED | OUTPATIENT
Start: 2018-02-16 | End: 2018-02-16

## 2018-02-16 RX ADMIN — HYDROCODONE BITARTRATE AND ACETAMINOPHEN 2 TABLET: 5; 325 TABLET ORAL at 18:38

## 2018-02-16 ASSESSMENT — PAIN DESCRIPTION - DESCRIPTORS
DESCRIPTORS: ACHING
DESCRIPTORS: ACHING

## 2018-02-16 ASSESSMENT — PAIN DESCRIPTION - ORIENTATION
ORIENTATION: RIGHT
ORIENTATION: RIGHT

## 2018-02-16 ASSESSMENT — PAIN DESCRIPTION - LOCATION
LOCATION: KNEE
LOCATION: KNEE

## 2018-02-16 ASSESSMENT — ENCOUNTER SYMPTOMS
NAUSEA: 0
TROUBLE SWALLOWING: 0
SHORTNESS OF BREATH: 0
COUGH: 0
VOMITING: 0

## 2018-02-16 ASSESSMENT — PAIN SCALES - GENERAL
PAINLEVEL_OUTOF10: 7
PAINLEVEL_OUTOF10: 4
PAINLEVEL_OUTOF10: 8

## 2018-02-16 ASSESSMENT — PAIN DESCRIPTION - PAIN TYPE
TYPE: ACUTE PAIN
TYPE: ACUTE PAIN

## 2018-02-16 ASSESSMENT — PAIN DESCRIPTION - FREQUENCY: FREQUENCY: CONTINUOUS

## 2018-02-16 ASSESSMENT — PAIN DESCRIPTION - PROGRESSION: CLINICAL_PROGRESSION: GRADUALLY IMPROVING

## 2018-02-16 NOTE — ED PROVIDER NOTES
obstructive pulmonary disease) ([address]     CVA (cerebral infarction) 1997    Rt. side numbness & weakness    Depression     Diverticulosis     DVT (deep venous thrombosis) (HCC)     multiple- in Pregnancy    Full dentures     Upper only    GERD (gastroesophageal reflux disease)     HDL lipoprotein deficiency     Hiatal hernia     HTN (hypertension)     Hx of blood clots 2011    PE    Hypercalcemia     Hyperlipemia     Hyperparathyroidism ([address]     Hypomagnesemia     Hypophosphatemia     Leaky heart valve     Liver fatty degeneration     MDRO (multiple drug resistant organisms) resistance 1/2015    MRSA from spider bite    Pulmonary valve disease     Scoliosis     Sleep apnea     no machine    Thyroid disease     hypothryoid    Tobacco dependence     Varicose vein of leg      Reviewed. SURGICAL HISTORY           Procedure Laterality Date    CARDIAC CATHETERIZATION      CARPAL TUNNEL RELEASE Bilateral     CHOLECYSTECTOMY      COLONOSCOPY  2009 or 2010    FINGER TRIGGER RELEASE Bilateral     x 2    HYSTERECTOMY  2005    PLANTAR FASCIA SURGERY Bilateral     THYROIDECTOMY N/A 9/29/2017    PARATHYROIDECTOMY WITH NIM MONITOR performed by Niki Franco MD at [facility]  2004    VARICOSE VEIN SURGERY      VENA CAVA FILTER PLACEMENT  01/26/2011 of placement     vena tech vena cava filter placed by Dr Andrew Infante at [address]   CURRENT MEDICATIONS       Previous Medications    ALBUTEROL (PROVENTIL) (2.5 MG/3ML) 0.083% NEBULIZER SOLUTION    Take 3 mLs by nebulization every 4 hours as needed for Wheezing    ALBUTEROL-IPRATROPIUM (COMBIVENT RESPIMAT)  MCG/ACT AERS INHALER    Inhale 1 puff into the lungs every 6 hours    CINACALCET (SENSIPAR) 30 MG TABLET    Take 1 tablet by mouth daily    LEVOTHYROXINE (SYNTHROID) 125 MCG TABLET    Take 2 tablets by mouth Daily    METOCLOPRAMIDE (REGLAN) 10 MG TABLET    Take 1 tablet by mouth 4 times daily    MOMETASONE-FORMOTEROL history. She has never used smokeless tobacco. She reports that she does not drink alcohol or use drugs. Reviewed. PHYSICAL EXAM    (up to 7 for level 4, 8 or more for level 5)     ED Triage Vitals [02/16/18 1739]   BP Temp Temp Source Pulse Resp SpO2 Height Weight   (!) 152/90 98.1 °F (36.7 °C) Oral 101 16 100 % 5' 2\" (1.575 m) 210 lb (95.3 kg)       Physical Exam   Constitutional: She is oriented to person, place, and time. She appears well-developed and well-nourished. No distress. HENT:   Head: Normocephalic and atraumatic. Right Ear: External ear normal.   Left Ear: External ear normal.   Nose: Nose normal.   Eyes: Right eye exhibits no discharge. Left eye exhibits no discharge. No scleral icterus. Neck: Normal range of motion. No tracheal deviation present. Pulmonary/Chest: Effort normal. No stridor. No respiratory distress. Musculoskeletal: Normal range of motion. She exhibits no edema. Right knee: She exhibits swelling (mild anterior swelling). She exhibits normal range of motion, no effusion, no ecchymosis, no deformity, no laceration and no erythema. Tenderness found. Medial joint line and lateral joint line tenderness noted. Right knee tenderness. No swelling, erythema, ecchymosis. No joint laxity. Ambulates with a limp. DP/PT palpable, +2. Sensation intact. Cap refill less than 2 seconds. Neurological: She is alert and oriented to person, place, and time. Coordination normal.   Skin: Skin is warm and dry. She is not diaphoretic. Psychiatric: She has a normal mood and affect.  Her behavior is normal.       DIAGNOSTIC RESULTS     RADIOLOGY:   [] Visualized by me  And Perri Calvo MD    Xr Knee Right (3 Views)    Result Date: 2/16/2018  EXAMINATION: 3 VIEWS OF THE RIGHT KNEE 2/16/2018 6:44 pm COMPARISON: March 11, 2010 HISTORY: ORDERING SYSTEM PROVIDED HISTORY: injury TECHNOLOGIST PROVIDED HISTORY: Reason for exam:->injury Ordering Physician Provided Reason for Exam: pain

## 2018-03-01 ENCOUNTER — HOSPITAL ENCOUNTER (EMERGENCY)
Age: 43
Discharge: HOME | End: 2018-03-01
Attending: EMERGENCY MEDICINE
Payer: COMMERCIAL

## 2018-03-01 VITALS
BODY MASS INDEX: 41.23 KG/M2 | HEIGHT: 60 IN | HEART RATE: 100 BPM | RESPIRATION RATE: 18 BRPM | TEMPERATURE: 98.9 F | DIASTOLIC BLOOD PRESSURE: 77 MMHG | WEIGHT: 210 LBS | OXYGEN SATURATION: 95 % | SYSTOLIC BLOOD PRESSURE: 118 MMHG

## 2018-03-01 DIAGNOSIS — L02.91 ABSCESS: Primary | ICD-10-CM

## 2018-03-01 PROCEDURE — 6370000000 HC RX 637 (ALT 250 FOR IP): Performed by: EMERGENCY MEDICINE

## 2018-03-01 PROCEDURE — 10060 I&D ABSCESS SIMPLE/SINGLE: CPT

## 2018-03-01 PROCEDURE — 99283 EMERGENCY DEPT VISIT LOW MDM: CPT

## 2018-03-01 RX ORDER — CEPHALEXIN 250 MG/1
500 CAPSULE ORAL ONCE
Status: COMPLETED | OUTPATIENT
Start: 2018-03-01 | End: 2018-03-01

## 2018-03-01 RX ORDER — IBUPROFEN 800 MG/1
800 TABLET ORAL EVERY 8 HOURS PRN
Qty: 30 TABLET | Refills: 0 | Status: SHIPPED | OUTPATIENT
Start: 2018-03-01 | End: 2018-07-11 | Stop reason: ALTCHOICE

## 2018-03-01 RX ORDER — LIDOCAINE HYDROCHLORIDE 10 MG/ML
20 INJECTION, SOLUTION INFILTRATION; PERINEURAL ONCE
Status: DISCONTINUED | OUTPATIENT
Start: 2018-03-01 | End: 2018-03-01 | Stop reason: HOSPADM

## 2018-03-01 RX ORDER — CEPHALEXIN 500 MG/1
500 CAPSULE ORAL 4 TIMES DAILY
Qty: 28 CAPSULE | Refills: 0 | Status: SHIPPED | OUTPATIENT
Start: 2018-03-01 | End: 2018-03-08

## 2018-03-01 RX ORDER — OXYCODONE HYDROCHLORIDE AND ACETAMINOPHEN 5; 325 MG/1; MG/1
2 TABLET ORAL ONCE
Status: COMPLETED | OUTPATIENT
Start: 2018-03-01 | End: 2018-03-01

## 2018-03-01 RX ORDER — SULFAMETHOXAZOLE AND TRIMETHOPRIM 800; 160 MG/1; MG/1
1 TABLET ORAL ONCE
Status: COMPLETED | OUTPATIENT
Start: 2018-03-01 | End: 2018-03-01

## 2018-03-01 RX ORDER — SULFAMETHOXAZOLE AND TRIMETHOPRIM 800; 160 MG/1; MG/1
1 TABLET ORAL 2 TIMES DAILY
Qty: 14 TABLET | Refills: 0 | Status: SHIPPED | OUTPATIENT
Start: 2018-03-01 | End: 2018-03-08

## 2018-03-01 RX ADMIN — SULFAMETHOXAZOLE AND TRIMETHOPRIM 1 TABLET: 800; 160 TABLET ORAL at 16:05

## 2018-03-01 RX ADMIN — OXYCODONE HYDROCHLORIDE AND ACETAMINOPHEN 2 TABLET: 5; 325 TABLET ORAL at 15:11

## 2018-03-01 RX ADMIN — CEPHALEXIN 500 MG: 250 CAPSULE ORAL at 16:05

## 2018-03-01 ASSESSMENT — ENCOUNTER SYMPTOMS
NAUSEA: 0
COUGH: 0
DIARRHEA: 0
EYE PAIN: 0
VOMITING: 0
CONSTIPATION: 0
ABDOMINAL PAIN: 0
SHORTNESS OF BREATH: 0

## 2018-03-01 ASSESSMENT — PAIN SCALES - GENERAL
PAINLEVEL_OUTOF10: 10
PAINLEVEL_OUTOF10: 10

## 2018-03-01 ASSESSMENT — PAIN DESCRIPTION - DESCRIPTORS: DESCRIPTORS: SHARP;BURNING

## 2018-03-01 ASSESSMENT — PAIN DESCRIPTION - ORIENTATION: ORIENTATION: MID

## 2018-03-01 ASSESSMENT — PAIN DESCRIPTION - LOCATION: LOCATION: BUTTOCKS

## 2018-03-01 ASSESSMENT — PAIN DESCRIPTION - PAIN TYPE: TYPE: ACUTE PAIN

## 2018-03-01 NOTE — ED PROVIDER NOTES
[address]  Emergency Department Encounter  Emergency Medicine Resident     Pt Name: Sean Yost  MRN: [id number]  [place] 1975  Date of evaluation: 3/1/18  PCP:  Arley Hatchet, MD    CHIEF COMPLAINT       Chief Complaint   Patient presents with    Abscess       HISTORY OF PRESENT ILLNESS  (Location/Symptom, Timing/Onset, Context/Setting, Quality, Duration, Modifying Factors, Severity.)      Sean Yost is a 38 y.o. female who presents with abscess to left buttock. Patient states that she noticed a \"boil\" that was located on her buttocks approximately 2 weeks ago. Patient states that spontaneously drained. Patient states she noticed it returned yesterday. Patient states that it is painful and hurts when sitting on it. States she has had some other abscess in the past.  Patient does have history of prediabetes according to patient. Patient states she has history of MRSA. Patient has been able to urinate and defecate. Patient denies any abdominal pain, chest pain, shortness of breath, fevers, nausea, or vomiting. PAST MEDICAL / SURGICAL / SOCIAL / FAMILY HISTORY      has a past medical history of Arthritis; Asthma; Back pain, chronic; Bright red rectal bleeding; CKD (chronic kidney disease) stage 1, GFR 90 ml/min or greater; COPD (chronic obstructive pulmonary disease) ([address] CVA (cerebral infarction); Depression; Diverticulosis; DVT (deep venous thrombosis) ([address] Full dentures; GERD (gastroesophageal reflux disease); HDL lipoprotein deficiency; Hiatal hernia; HTN (hypertension); Hx of blood clots; Hypercalcemia; Hyperlipemia; Hyperparathyroidism ([address] Hypomagnesemia; Hypophosphatemia; Leaky heart valve; Liver fatty degeneration; MDRO (multiple drug resistant organisms) resistance; Pulmonary valve disease; Scoliosis; Sleep apnea; Thyroid disease;  Tobacco dependence; and Varicose vein of leg.     has a past surgical history that includes Vena Cava Filter Placement (01/26/2011 whitening. Does appear purulent. DIFFERENTIAL  DIAGNOSIS     PLAN (LABS / IMAGING / EKG):  No orders of the defined types were placed in this encounter. MEDICATIONS ORDERED:  Orders Placed This Encounter   Medications    oxyCODONE-acetaminophen (PERCOCET) 5-325 MG per tablet 2 tablet    lidocaine 1 % injection 20 mL    sulfamethoxazole-trimethoprim (BACTRIM DS;SEPTRA DS) 800-160 MG per tablet 1 tablet    cephALEXin (KEFLEX) capsule 500 mg    cephALEXin (KEFLEX) 500 MG capsule     Sig: Take 1 capsule by mouth 4 times daily for 7 days     Dispense:  28 capsule     Refill:  0    sulfamethoxazole-trimethoprim (BACTRIM DS) 800-160 MG per tablet     Sig: Take 1 tablet by mouth 2 times daily for 7 days     Dispense:  14 tablet     Refill:  0    ibuprofen (ADVIL;MOTRIN) 800 MG tablet     Sig: Take 1 tablet by mouth every 8 hours as needed for Pain     Dispense:  30 tablet     Refill:  0       DDX: Gluteal abscess, MRSA, perirectal abscess    DIAGNOSTIC RESULTS / EMERGENCY DEPARTMENT COURSE / MDM     LABS:  No results found for this visit on 03/01/18. IMPRESSION: Patient evaluated by myself and attending physician. Patient hypertensive on arrival at 168/102 and tachycardic at 119 like due to pain. Heart rate on auscultation and tachycardic with regular rhythm. Lungs clear to auscultation bilaterally. Abdomen soft, nontender, nondistended. Did evaluate abscess on left buttock. Approximately 2-3 cm in diameter. Does feel purulent. Small central area of lightning. Unable to express anything with pressure. Abscesses on soft tissue of the gluteal region approximately 6 cm away from anus. Do not believe patient has perirectal abscess and patient has had no difficulty defecating only states it hurts to sit. We'll plan for I&D in the emergency department. Patient agrees with plan.     RADIOLOGY:  None    EKG  None    All EKG's are interpreted by the Emergency Department Physician who either signs or

## 2018-03-01 NOTE — ED TRIAGE NOTES
Pt states 2 weeks ago had a small boil on butt that popped on its own. States yesterday it came back and is much bigger, very painful, feels like it is going to pop. Pt currently has gauze in place.

## 2018-03-06 ENCOUNTER — OFFICE VISIT (OUTPATIENT)
Dept: INTERNAL MEDICINE CLINIC | Age: 43
End: 2018-03-06
Payer: COMMERCIAL

## 2018-03-06 VITALS
SYSTOLIC BLOOD PRESSURE: 124 MMHG | HEIGHT: 60 IN | BODY MASS INDEX: 42.41 KG/M2 | DIASTOLIC BLOOD PRESSURE: 72 MMHG | WEIGHT: 216 LBS

## 2018-03-06 DIAGNOSIS — B37.31 VAGINAL YEAST INFECTION: ICD-10-CM

## 2018-03-06 DIAGNOSIS — Z12.39 BREAST CANCER SCREENING: ICD-10-CM

## 2018-03-06 DIAGNOSIS — E78.6 HDL LIPOPROTEIN DEFICIENCY: ICD-10-CM

## 2018-03-06 DIAGNOSIS — R73.03 PRE-DIABETES: ICD-10-CM

## 2018-03-06 DIAGNOSIS — J41.0 SIMPLE CHRONIC BRONCHITIS (HCC): Primary | ICD-10-CM

## 2018-03-06 PROCEDURE — 99213 OFFICE O/P EST LOW 20 MIN: CPT | Performed by: INTERNAL MEDICINE

## 2018-03-06 RX ORDER — FLUCONAZOLE 150 MG/1
150 TABLET ORAL ONCE
Qty: 1 TABLET | Refills: 1 | Status: SHIPPED | OUTPATIENT
Start: 2018-03-06 | End: 2018-03-06

## 2018-03-06 ASSESSMENT — ENCOUNTER SYMPTOMS
SHORTNESS OF BREATH: 0
BLOOD IN STOOL: 0
ABDOMINAL DISTENTION: 0
WHEEZING: 0
EYE DISCHARGE: 0
COUGH: 0
EYE PAIN: 0
TROUBLE SWALLOWING: 0
DIARRHEA: 0
COLOR CHANGE: 0

## 2018-03-06 NOTE — PROGRESS NOTES
Subjective:      Patient ID: Anjel Burgos is a 38 y.o. female. Chronic Disease Visit Information    BP Readings from Last 3 Encounters:   03/01/18 118/77   02/16/18 (!) 152/90   12/05/17 132/86          Hemoglobin A1C (%)   Date Value   05/20/2016 5.7   04/18/2012 5.5     LDL Cholesterol (mg/dL)   Date Value   08/13/2014 125     HDL (mg/dL)   Date Value   08/13/2014 22 (L)     BUN (mg/dL)   Date Value   09/15/2017 9     CREATININE (mg/dL)   Date Value   09/15/2017 0.40 (L)     Glucose (mg/dL)   Date Value   09/15/2017 113 (H)   04/18/2012 79            Have you changed or started any medications since your last visit including any over-the-counter medicines, vitamins, or herbal medicines? no   Are you having any side effects from any of your medications? -  no  Have you stopped taking any of your medications? Is so, why? -  no    Have you seen any other physician or provider since your last visit? Yes - Records Obtained  Have you had any other diagnostic tests since your last visit? No  Have you been seen in the emergency room and/or had an admission to a hospital since we last saw you? No  Have you had your annual diabetic retinal (eye) exam? No  Have you had your routine dental cleaning in the past 6 months? no    Have you activated your [employer] account? If not, what are your barriers?  Yes     Patient Care Team:  Wayne Montemayor MD as PCP - General (Internal Medicine)  Cyril Hutchinson MD as Consulting Physician (Gastroenterology)         Medical History Review  Past Medical, Family, and Social History reviewed and does contribute to the patient presenting condition    Health Maintenance   Topic Date Due    HIV screen  07/01/1988    DTaP/Tdap/Td vaccine (1 - Tdap) 07/01/1992    Cervical cancer screen  07/01/1994    A1C test (Diabetic or Prediabetic)  05/20/2017    Flu vaccine (1) 09/01/2017    Breast cancer screen  03/03/2018    TSH testing  08/03/2018    Potassium monitoring  08/28/2018   

## 2018-03-13 ENCOUNTER — HOSPITAL ENCOUNTER (OUTPATIENT)
Dept: WOMENS IMAGING | Age: 43
Discharge: HOME | End: 2018-03-15
Payer: COMMERCIAL

## 2018-03-13 DIAGNOSIS — Z12.39 BREAST CANCER SCREENING: ICD-10-CM

## 2018-03-13 DIAGNOSIS — N63.20 LEFT BREAST LUMP: Primary | ICD-10-CM

## 2018-03-13 DIAGNOSIS — N63.20 LEFT BREAST LUMP: ICD-10-CM

## 2018-03-13 DIAGNOSIS — N63.0 LUMP IN FEMALE BREAST: ICD-10-CM

## 2018-03-13 PROCEDURE — 77066 DX MAMMO INCL CAD BI: CPT

## 2018-03-13 PROCEDURE — 76642 ULTRASOUND BREAST LIMITED: CPT

## 2018-04-22 ENCOUNTER — HOSPITAL ENCOUNTER (EMERGENCY)
Age: 43
Discharge: HOME | End: 2018-04-22
Attending: EMERGENCY MEDICINE
Payer: COMMERCIAL

## 2018-04-22 ENCOUNTER — HOSPITAL ENCOUNTER (OUTPATIENT)
Age: 43
End: 2018-04-22
Payer: COMMERCIAL

## 2018-04-22 ENCOUNTER — HOSPITAL ENCOUNTER (OUTPATIENT)
Age: 43
Discharge: HOME | End: 2018-04-22
Payer: COMMERCIAL

## 2018-04-22 VITALS
HEART RATE: 68 BPM | TEMPERATURE: 98.2 F | BODY MASS INDEX: 38.09 KG/M2 | SYSTOLIC BLOOD PRESSURE: 119 MMHG | OXYGEN SATURATION: 96 % | WEIGHT: 207 LBS | RESPIRATION RATE: 16 BRPM | HEIGHT: 62 IN | DIASTOLIC BLOOD PRESSURE: 74 MMHG

## 2018-04-22 DIAGNOSIS — E11.9 NEW ONSET TYPE 2 DIABETES MELLITUS (HCC): Primary | ICD-10-CM

## 2018-04-22 DIAGNOSIS — R73.9 HYPERGLYCEMIA: ICD-10-CM

## 2018-04-22 LAB
ABSOLUTE EOS #: 0.1 K/UL (ref 0–0.4)
ABSOLUTE IMMATURE GRANULOCYTE: NORMAL K/UL (ref 0–0.3)
ABSOLUTE LYMPH #: 2.9 K/UL (ref 1–4.8)
ABSOLUTE MONO #: 0.3 K/UL (ref 0.1–1.3)
ALBUMIN SERPL-MCNC: 3.7 G/DL (ref 3.5–5.2)
ALBUMIN/GLOBULIN RATIO: ABNORMAL (ref 1–2.5)
ALP BLD-CCNC: 123 U/L (ref 35–104)
ALT SERPL-CCNC: 64 U/L (ref 5–33)
ANION GAP SERPL CALCULATED.3IONS-SCNC: 10 MMOL/L (ref 9–17)
AST SERPL-CCNC: 51 U/L
BASOPHILS # BLD: 1 % (ref 0–2)
BASOPHILS ABSOLUTE: 0.1 K/UL (ref 0–0.2)
BILIRUB SERPL-MCNC: 0.59 MG/DL (ref 0.3–1.2)
BUN BLDV-MCNC: 14 MG/DL (ref 6–20)
BUN/CREAT BLD: ABNORMAL (ref 9–20)
CALCIUM SERPL-MCNC: 8.3 MG/DL (ref 8.6–10.4)
CHLORIDE BLD-SCNC: 100 MMOL/L (ref 98–107)
CHOLESTEROL/HDL RATIO: 7.7
CHOLESTEROL: 153 MG/DL
CHP ED QC CHECK: YES
CO2: 25 MMOL/L (ref 20–31)
CREAT SERPL-MCNC: 0.44 MG/DL (ref 0.5–0.9)
DIFFERENTIAL TYPE: NORMAL
EOSINOPHILS RELATIVE PERCENT: 2 % (ref 0–4)
GFR AFRICAN AMERICAN: >60 ML/MIN
GFR NON-AFRICAN AMERICAN: >60 ML/MIN
GFR SERPL CREATININE-BSD FRML MDRD: ABNORMAL ML/MIN/{1.73_M2}
GFR SERPL CREATININE-BSD FRML MDRD: ABNORMAL ML/MIN/{1.73_M2}
GLUCOSE BLD-MCNC: 284 MG/DL (ref 65–105)
GLUCOSE BLD-MCNC: 302 MG/DL (ref 70–99)
GLUCOSE BLD-MCNC: 345 MG/DL
GLUCOSE BLD-MCNC: 345 MG/DL (ref 65–105)
HCT VFR BLD CALC: 44 % (ref 36–46)
HDLC SERPL-MCNC: 20 MG/DL
HEMOGLOBIN: 15 G/DL (ref 12–16)
IMMATURE GRANULOCYTES: NORMAL %
LDL CHOLESTEROL: 75 MG/DL (ref 0–130)
LYMPHOCYTES # BLD: 41 % (ref 24–44)
MCH RBC QN AUTO: 31.4 PG (ref 26–34)
MCHC RBC AUTO-ENTMCNC: 34 G/DL (ref 31–37)
MCV RBC AUTO: 92.1 FL (ref 80–100)
MONOCYTES # BLD: 5 % (ref 1–7)
NRBC AUTOMATED: NORMAL PER 100 WBC
PDW BLD-RTO: 12.8 % (ref 11.5–14.9)
PLATELET # BLD: 196 K/UL (ref 150–450)
PLATELET ESTIMATE: NORMAL
PMV BLD AUTO: 9.5 FL (ref 6–12)
POTASSIUM SERPL-SCNC: 4 MMOL/L (ref 3.7–5.3)
RBC # BLD: 4.78 M/UL (ref 4–5.2)
RBC # BLD: NORMAL 10*6/UL
SEG NEUTROPHILS: 51 % (ref 36–66)
SEGMENTED NEUTROPHILS ABSOLUTE COUNT: 3.6 K/UL (ref 1.3–9.1)
SODIUM BLD-SCNC: 135 MMOL/L (ref 135–144)
THYROXINE, FREE: 0.95 NG/DL (ref 0.93–1.7)
TOTAL PROTEIN: 7 G/DL (ref 6.4–8.3)
TRIGL SERPL-MCNC: 292 MG/DL
TSH SERPL DL<=0.05 MIU/L-ACNC: 6.27 MIU/L (ref 0.3–5)
VLDLC SERPL CALC-MCNC: ABNORMAL MG/DL (ref 1–30)
WBC # BLD: 7 K/UL (ref 3.5–11)
WBC # BLD: NORMAL 10*3/UL

## 2018-04-22 PROCEDURE — 36415 COLL VENOUS BLD VENIPUNCTURE: CPT

## 2018-04-22 PROCEDURE — 85025 COMPLETE CBC W/AUTO DIFF WBC: CPT

## 2018-04-22 PROCEDURE — 83036 HEMOGLOBIN GLYCOSYLATED A1C: CPT

## 2018-04-22 PROCEDURE — 99285 EMERGENCY DEPT VISIT HI MDM: CPT

## 2018-04-22 PROCEDURE — 80061 LIPID PANEL: CPT

## 2018-04-22 PROCEDURE — 6370000000 HC RX 637 (ALT 250 FOR IP): Performed by: EMERGENCY MEDICINE

## 2018-04-22 PROCEDURE — 84443 ASSAY THYROID STIM HORMONE: CPT

## 2018-04-22 PROCEDURE — 82947 ASSAY GLUCOSE BLOOD QUANT: CPT

## 2018-04-22 PROCEDURE — 80053 COMPREHEN METABOLIC PANEL: CPT

## 2018-04-22 PROCEDURE — 84439 ASSAY OF FREE THYROXINE: CPT

## 2018-04-22 PROCEDURE — 2580000003 HC RX 258: Performed by: EMERGENCY MEDICINE

## 2018-04-22 RX ORDER — 0.9 % SODIUM CHLORIDE 0.9 %
1000 INTRAVENOUS SOLUTION INTRAVENOUS ONCE
Status: COMPLETED | OUTPATIENT
Start: 2018-04-22 | End: 2018-04-22

## 2018-04-22 RX ORDER — ACETAMINOPHEN 325 MG/1
650 TABLET ORAL ONCE
Status: COMPLETED | OUTPATIENT
Start: 2018-04-22 | End: 2018-04-22

## 2018-04-22 RX ADMIN — ACETAMINOPHEN 650 MG: 325 TABLET, FILM COATED ORAL at 10:45

## 2018-04-22 RX ADMIN — SODIUM CHLORIDE 1000 ML: 9 INJECTION, SOLUTION INTRAVENOUS at 09:45

## 2018-04-22 RX ADMIN — METFORMIN HYDROCHLORIDE 1000 MG: 1000 TABLET ORAL at 11:24

## 2018-04-22 ASSESSMENT — ENCOUNTER SYMPTOMS
EYE PAIN: 0
COUGH: 0
BACK PAIN: 0
NAUSEA: 0
SHORTNESS OF BREATH: 0
VOMITING: 0
DIARRHEA: 0
ABDOMINAL PAIN: 0
SORE THROAT: 0

## 2018-04-22 ASSESSMENT — PAIN DESCRIPTION - DESCRIPTORS
DESCRIPTORS: ACHING
DESCRIPTORS: ACHING

## 2018-04-22 ASSESSMENT — PAIN SCALES - GENERAL
PAINLEVEL_OUTOF10: 5
PAINLEVEL_OUTOF10: 5
PAINLEVEL_OUTOF10: 7

## 2018-04-22 ASSESSMENT — PAIN DESCRIPTION - FREQUENCY
FREQUENCY: CONTINUOUS
FREQUENCY: CONTINUOUS

## 2018-04-22 ASSESSMENT — PAIN DESCRIPTION - PAIN TYPE: TYPE: CHRONIC PAIN;ACUTE PAIN

## 2018-04-22 ASSESSMENT — PAIN DESCRIPTION - LOCATION
LOCATION: HEAD
LOCATION: HEAD

## 2018-04-23 ENCOUNTER — TELEPHONE (OUTPATIENT)
Dept: INTERNAL MEDICINE CLINIC | Age: 43
End: 2018-04-23

## 2018-04-23 DIAGNOSIS — R73.03 PRE-DIABETES: Primary | ICD-10-CM

## 2018-04-23 LAB
ESTIMATED AVERAGE GLUCOSE: 289 MG/DL
HBA1C MFR BLD: 11.7 % (ref 4–6)

## 2018-04-23 RX ORDER — BLOOD-GLUCOSE METER
1 EACH MISCELLANEOUS DAILY
Qty: 1 KIT | Refills: 0 | Status: SHIPPED | OUTPATIENT
Start: 2018-04-23 | End: 2018-07-11

## 2018-04-23 RX ORDER — LANCETS 30 GAUGE
EACH MISCELLANEOUS
Qty: 100 EACH | Refills: 3 | Status: SHIPPED | OUTPATIENT
Start: 2018-04-23 | End: 2020-02-21 | Stop reason: ALTCHOICE

## 2018-04-24 ENCOUNTER — OFFICE VISIT (OUTPATIENT)
Dept: INTERNAL MEDICINE CLINIC | Age: 43
End: 2018-04-24
Payer: COMMERCIAL

## 2018-04-24 ENCOUNTER — HOSPITAL ENCOUNTER (OUTPATIENT)
Age: 43
Setting detail: SPECIMEN
Discharge: HOME | End: 2018-04-24
Payer: COMMERCIAL

## 2018-04-24 VITALS
SYSTOLIC BLOOD PRESSURE: 138 MMHG | BODY MASS INDEX: 39.01 KG/M2 | HEIGHT: 62 IN | WEIGHT: 212 LBS | DIASTOLIC BLOOD PRESSURE: 88 MMHG

## 2018-04-24 DIAGNOSIS — N18.1 CKD (CHRONIC KIDNEY DISEASE) STAGE 1, GFR 90 ML/MIN OR GREATER: ICD-10-CM

## 2018-04-24 DIAGNOSIS — I10 ESSENTIAL HYPERTENSION: ICD-10-CM

## 2018-04-24 DIAGNOSIS — J41.0 SIMPLE CHRONIC BRONCHITIS (HCC): ICD-10-CM

## 2018-04-24 DIAGNOSIS — F33.1 MAJOR DEPRESSIVE DISORDER, RECURRENT EPISODE, MODERATE (HCC): Chronic | ICD-10-CM

## 2018-04-24 DIAGNOSIS — E66.01 MORBIDLY OBESE (HCC): ICD-10-CM

## 2018-04-24 DIAGNOSIS — E78.00 PURE HYPERCHOLESTEROLEMIA: ICD-10-CM

## 2018-04-24 LAB
CREATININE URINE: 118.6 MG/DL (ref 28–217)
MICROALBUMIN/CREAT 24H UR: 47 MG/L
MICROALBUMIN/CREAT UR-RTO: 40 MCG/MG CREAT

## 2018-04-24 PROCEDURE — G8926 SPIRO NO PERF OR DOC: HCPCS | Performed by: INTERNAL MEDICINE

## 2018-04-24 PROCEDURE — G8599 NO ASA/ANTIPLAT THER USE RNG: HCPCS | Performed by: INTERNAL MEDICINE

## 2018-04-24 PROCEDURE — G8427 DOCREV CUR MEDS BY ELIG CLIN: HCPCS | Performed by: INTERNAL MEDICINE

## 2018-04-24 PROCEDURE — G8417 CALC BMI ABV UP PARAM F/U: HCPCS | Performed by: INTERNAL MEDICINE

## 2018-04-24 PROCEDURE — 4004F PT TOBACCO SCREEN RCVD TLK: CPT | Performed by: INTERNAL MEDICINE

## 2018-04-24 PROCEDURE — 99214 OFFICE O/P EST MOD 30 MIN: CPT | Performed by: INTERNAL MEDICINE

## 2018-04-24 PROCEDURE — 3023F SPIROM DOC REV: CPT | Performed by: INTERNAL MEDICINE

## 2018-04-24 PROCEDURE — 3046F HEMOGLOBIN A1C LEVEL >9.0%: CPT | Performed by: INTERNAL MEDICINE

## 2018-04-24 PROCEDURE — 2022F DILAT RTA XM EVC RTNOPTHY: CPT | Performed by: INTERNAL MEDICINE

## 2018-04-24 RX ORDER — GLUCOSAMINE HCL/CHONDROITIN SU 500-400 MG
CAPSULE ORAL
Qty: 50 STRIP | Refills: 6 | Status: SHIPPED | OUTPATIENT
Start: 2018-04-24 | End: 2018-07-11

## 2018-04-24 RX ORDER — GLIMEPIRIDE 2 MG/1
2 TABLET ORAL
Qty: 30 TABLET | Refills: 6 | Status: SHIPPED | OUTPATIENT
Start: 2018-04-24 | End: 2019-03-15

## 2018-04-24 ASSESSMENT — ENCOUNTER SYMPTOMS
SHORTNESS OF BREATH: 0
WHEEZING: 0
DIARRHEA: 0
ABDOMINAL DISTENTION: 0
EYE DISCHARGE: 0
BLOOD IN STOOL: 0
COLOR CHANGE: 0
EYE PAIN: 0
TROUBLE SWALLOWING: 0
COUGH: 0

## 2018-05-16 ENCOUNTER — OFFICE VISIT (OUTPATIENT)
Dept: INTERNAL MEDICINE CLINIC | Age: 43
End: 2018-05-16
Payer: COMMERCIAL

## 2018-05-16 VITALS
BODY MASS INDEX: 37.36 KG/M2 | DIASTOLIC BLOOD PRESSURE: 72 MMHG | HEIGHT: 62 IN | SYSTOLIC BLOOD PRESSURE: 118 MMHG | WEIGHT: 203 LBS

## 2018-05-16 DIAGNOSIS — I10 ESSENTIAL HYPERTENSION: Primary | ICD-10-CM

## 2018-05-16 DIAGNOSIS — E11.9 TYPE 2 DIABETES MELLITUS WITHOUT COMPLICATION, WITHOUT LONG-TERM CURRENT USE OF INSULIN (HCC): ICD-10-CM

## 2018-05-16 PROCEDURE — 2022F DILAT RTA XM EVC RTNOPTHY: CPT | Performed by: INTERNAL MEDICINE

## 2018-05-16 PROCEDURE — 99213 OFFICE O/P EST LOW 20 MIN: CPT | Performed by: INTERNAL MEDICINE

## 2018-05-16 PROCEDURE — G8599 NO ASA/ANTIPLAT THER USE RNG: HCPCS | Performed by: INTERNAL MEDICINE

## 2018-05-16 PROCEDURE — G8427 DOCREV CUR MEDS BY ELIG CLIN: HCPCS | Performed by: INTERNAL MEDICINE

## 2018-05-16 PROCEDURE — G8417 CALC BMI ABV UP PARAM F/U: HCPCS | Performed by: INTERNAL MEDICINE

## 2018-05-16 PROCEDURE — 3046F HEMOGLOBIN A1C LEVEL >9.0%: CPT | Performed by: INTERNAL MEDICINE

## 2018-05-16 PROCEDURE — 4004F PT TOBACCO SCREEN RCVD TLK: CPT | Performed by: INTERNAL MEDICINE

## 2018-06-20 ENCOUNTER — TELEPHONE (OUTPATIENT)
Dept: INTERNAL MEDICINE CLINIC | Age: 43
End: 2018-06-20

## 2018-07-02 ENCOUNTER — TELEPHONE (OUTPATIENT)
Dept: INTERNAL MEDICINE CLINIC | Age: 43
End: 2018-07-02

## 2018-07-11 ENCOUNTER — OFFICE VISIT (OUTPATIENT)
Dept: INTERNAL MEDICINE CLINIC | Age: 43
End: 2018-07-11
Payer: COMMERCIAL

## 2018-07-11 VITALS
SYSTOLIC BLOOD PRESSURE: 122 MMHG | DIASTOLIC BLOOD PRESSURE: 74 MMHG | BODY MASS INDEX: 37.36 KG/M2 | HEIGHT: 62 IN | WEIGHT: 203 LBS

## 2018-07-11 DIAGNOSIS — Z01.818 PREOPERATIVE CLEARANCE: ICD-10-CM

## 2018-07-11 DIAGNOSIS — I10 ESSENTIAL HYPERTENSION: ICD-10-CM

## 2018-07-11 DIAGNOSIS — J41.0 SIMPLE CHRONIC BRONCHITIS (HCC): ICD-10-CM

## 2018-07-11 DIAGNOSIS — E11.9 TYPE 2 DIABETES MELLITUS WITHOUT COMPLICATION, WITHOUT LONG-TERM CURRENT USE OF INSULIN (HCC): Primary | ICD-10-CM

## 2018-07-11 LAB — HBA1C MFR BLD: 6 %

## 2018-07-11 PROCEDURE — 3044F HG A1C LEVEL LT 7.0%: CPT | Performed by: INTERNAL MEDICINE

## 2018-07-11 PROCEDURE — 2022F DILAT RTA XM EVC RTNOPTHY: CPT | Performed by: INTERNAL MEDICINE

## 2018-07-11 PROCEDURE — G8926 SPIRO NO PERF OR DOC: HCPCS | Performed by: INTERNAL MEDICINE

## 2018-07-11 PROCEDURE — G8599 NO ASA/ANTIPLAT THER USE RNG: HCPCS | Performed by: INTERNAL MEDICINE

## 2018-07-11 PROCEDURE — G8417 CALC BMI ABV UP PARAM F/U: HCPCS | Performed by: INTERNAL MEDICINE

## 2018-07-11 PROCEDURE — 83036 HEMOGLOBIN GLYCOSYLATED A1C: CPT | Performed by: INTERNAL MEDICINE

## 2018-07-11 PROCEDURE — 99214 OFFICE O/P EST MOD 30 MIN: CPT | Performed by: INTERNAL MEDICINE

## 2018-07-11 PROCEDURE — G8427 DOCREV CUR MEDS BY ELIG CLIN: HCPCS | Performed by: INTERNAL MEDICINE

## 2018-07-11 PROCEDURE — 3023F SPIROM DOC REV: CPT | Performed by: INTERNAL MEDICINE

## 2018-07-11 PROCEDURE — 4004F PT TOBACCO SCREEN RCVD TLK: CPT | Performed by: INTERNAL MEDICINE

## 2018-07-11 RX ORDER — FLUOCINONIDE 1 MG/G
CREAM TOPICAL
COMMUNITY
Start: 2018-06-22 | End: 2019-03-15

## 2018-07-13 ENCOUNTER — TELEPHONE (OUTPATIENT)
Dept: INTERNAL MEDICINE CLINIC | Age: 43
End: 2018-07-13

## 2018-07-25 ASSESSMENT — ENCOUNTER SYMPTOMS
EYE REDNESS: 0
EYE DISCHARGE: 0
EYE ITCHING: 0
CONSTIPATION: 0
ABDOMINAL DISTENTION: 0
BLOOD IN STOOL: 0
COUGH: 0
EYE PAIN: 0
ABDOMINAL PAIN: 0
SHORTNESS OF BREATH: 0
BACK PAIN: 0
CHOKING: 0
APNEA: 0
COLOR CHANGE: 0
CHEST TIGHTNESS: 0
DIARRHEA: 0

## 2018-10-11 ENCOUNTER — TELEPHONE (OUTPATIENT)
Dept: INTERNAL MEDICINE CLINIC | Age: 43
End: 2018-10-11

## 2019-03-15 ENCOUNTER — OFFICE VISIT (OUTPATIENT)
Dept: INTERNAL MEDICINE CLINIC | Age: 44
End: 2019-03-15
Payer: COMMERCIAL

## 2019-03-15 VITALS
BODY MASS INDEX: 32.76 KG/M2 | DIASTOLIC BLOOD PRESSURE: 78 MMHG | SYSTOLIC BLOOD PRESSURE: 112 MMHG | HEIGHT: 62 IN | WEIGHT: 178 LBS

## 2019-03-15 DIAGNOSIS — E03.9 ACQUIRED HYPOTHYROIDISM: ICD-10-CM

## 2019-03-15 DIAGNOSIS — Z80.0 FAMILY HX OF COLON CANCER: ICD-10-CM

## 2019-03-15 DIAGNOSIS — E03.9 HYPOTHYROIDISM, UNSPECIFIED TYPE: ICD-10-CM

## 2019-03-15 DIAGNOSIS — Z12.31 ENCOUNTER FOR SCREENING MAMMOGRAM FOR BREAST CANCER: Primary | ICD-10-CM

## 2019-03-15 DIAGNOSIS — J41.0 SIMPLE CHRONIC BRONCHITIS (HCC): ICD-10-CM

## 2019-03-15 DIAGNOSIS — F17.200 TOBACCO DEPENDENCE: ICD-10-CM

## 2019-03-15 PROCEDURE — 99214 OFFICE O/P EST MOD 30 MIN: CPT | Performed by: INTERNAL MEDICINE

## 2019-03-15 RX ORDER — ALBUTEROL SULFATE 2.5 MG/3ML
2.5 SOLUTION RESPIRATORY (INHALATION) EVERY 4 HOURS PRN
Qty: 300 ML | Refills: 9 | Status: SHIPPED | OUTPATIENT
Start: 2019-03-15 | End: 2020-12-10 | Stop reason: SDUPTHER

## 2019-03-15 RX ORDER — LEVOTHYROXINE SODIUM 0.12 MG/1
250 TABLET ORAL DAILY
Qty: 60 TABLET | Refills: 3 | Status: SHIPPED | OUTPATIENT
Start: 2019-03-15 | End: 2019-04-24 | Stop reason: SDUPTHER

## 2019-03-15 RX ORDER — BUPROPION HYDROCHLORIDE 150 MG/1
150 TABLET, EXTENDED RELEASE ORAL 2 TIMES DAILY
Qty: 60 TABLET | Refills: 3 | Status: SHIPPED | OUTPATIENT
Start: 2019-03-15 | End: 2020-02-21

## 2019-03-26 ENCOUNTER — TELEPHONE (OUTPATIENT)
Dept: GASTROENTEROLOGY | Age: 44
End: 2019-03-26

## 2019-03-29 ASSESSMENT — ENCOUNTER SYMPTOMS
EYE ITCHING: 0
EYE PAIN: 0
ABDOMINAL PAIN: 0
BLOOD IN STOOL: 0
SHORTNESS OF BREATH: 1
COLOR CHANGE: 0
EYE DISCHARGE: 0
EYE REDNESS: 0
CONSTIPATION: 0
CHOKING: 0
BACK PAIN: 0
COUGH: 0
APNEA: 0
DIARRHEA: 0
CHEST TIGHTNESS: 0
ABDOMINAL DISTENTION: 0

## 2019-04-04 ENCOUNTER — TELEPHONE (OUTPATIENT)
Dept: GASTROENTEROLOGY | Age: 44
End: 2019-04-04

## 2019-04-23 ENCOUNTER — HOSPITAL ENCOUNTER (OUTPATIENT)
Age: 44
Discharge: HOME | End: 2019-04-23
Payer: COMMERCIAL

## 2019-04-23 ENCOUNTER — TELEPHONE (OUTPATIENT)
Dept: INTERNAL MEDICINE CLINIC | Age: 44
End: 2019-04-23

## 2019-04-23 DIAGNOSIS — E03.9 ACQUIRED HYPOTHYROIDISM: ICD-10-CM

## 2019-04-23 DIAGNOSIS — E03.9 HYPOTHYROIDISM, UNSPECIFIED TYPE: ICD-10-CM

## 2019-04-23 LAB
ESTIMATED AVERAGE GLUCOSE: 103 MG/DL
HBA1C MFR BLD: 5.2 % (ref 4–6)
TSH SERPL DL<=0.05 MIU/L-ACNC: 12.43 MIU/L (ref 0.3–5)

## 2019-04-23 PROCEDURE — 84443 ASSAY THYROID STIM HORMONE: CPT

## 2019-04-23 PROCEDURE — 36415 COLL VENOUS BLD VENIPUNCTURE: CPT

## 2019-04-23 PROCEDURE — 83036 HEMOGLOBIN GLYCOSYLATED A1C: CPT

## 2019-04-23 NOTE — TELEPHONE ENCOUNTER
TSH is high , Please ask patient whether she is taking her Thyroid Meds , Does she takes Thyroid Meds Empty stomach

## 2019-04-24 RX ORDER — LEVOTHYROXINE SODIUM 0.12 MG/1
250 TABLET ORAL DAILY
Qty: 60 TABLET | Refills: 5 | Status: SHIPPED | OUTPATIENT
Start: 2019-04-24 | End: 2019-08-20 | Stop reason: SDUPTHER

## 2019-04-24 NOTE — TELEPHONE ENCOUNTER
I spoke to patient she states she has not been taking her medication she was out of medication for sometime, I explained to her that she should give pharmcy or the office a call when she is low on medication.      I have pend medication refill to be signed

## 2019-04-26 ENCOUNTER — HOSPITAL ENCOUNTER (OUTPATIENT)
Dept: WOMENS IMAGING | Age: 44
Discharge: HOME | End: 2019-04-28

## 2019-04-26 DIAGNOSIS — Z12.31 ENCOUNTER FOR SCREENING MAMMOGRAM FOR BREAST CANCER: ICD-10-CM

## 2019-04-26 PROCEDURE — 77067 SCR MAMMO BI INCL CAD: CPT

## 2019-05-14 ENCOUNTER — OFFICE VISIT (OUTPATIENT)
Dept: GASTROENTEROLOGY | Age: 44
End: 2019-05-14
Payer: COMMERCIAL

## 2019-05-14 VITALS
BODY MASS INDEX: 32.82 KG/M2 | DIASTOLIC BLOOD PRESSURE: 78 MMHG | HEART RATE: 85 BPM | SYSTOLIC BLOOD PRESSURE: 121 MMHG | WEIGHT: 179.5 LBS

## 2019-05-14 DIAGNOSIS — Z80.0 FAMILY HISTORY OF COLON CANCER IN FATHER: ICD-10-CM

## 2019-05-14 DIAGNOSIS — Z80.0 FAMILY HISTORY OF COLON CANCER IN MOTHER: Primary | ICD-10-CM

## 2019-05-14 DIAGNOSIS — Z86.010 HISTORY OF COLON POLYPS: ICD-10-CM

## 2019-05-14 DIAGNOSIS — R10.84 GENERALIZED ABDOMINAL PAIN: ICD-10-CM

## 2019-05-14 DIAGNOSIS — R19.7 DIARRHEA, UNSPECIFIED TYPE: ICD-10-CM

## 2019-05-14 PROBLEM — Z86.0100 HISTORY OF COLON POLYPS: Status: ACTIVE | Noted: 2019-05-14

## 2019-05-14 PROCEDURE — 99244 OFF/OP CNSLTJ NEW/EST MOD 40: CPT | Performed by: INTERNAL MEDICINE

## 2019-05-14 ASSESSMENT — ENCOUNTER SYMPTOMS
VOICE CHANGE: 0
NAUSEA: 0
COUGH: 0
TROUBLE SWALLOWING: 0
EYES NEGATIVE: 1
RECTAL PAIN: 0
ABDOMINAL PAIN: 1
BLOOD IN STOOL: 0
RESPIRATORY NEGATIVE: 1
ALLERGIC/IMMUNOLOGIC NEGATIVE: 1
SORE THROAT: 0
CHOKING: 0
CONSTIPATION: 0
WHEEZING: 0
SINUS PRESSURE: 0
VOMITING: 0
ABDOMINAL DISTENTION: 0
BACK PAIN: 0
DIARRHEA: 1
ANAL BLEEDING: 0

## 2019-05-14 NOTE — PROGRESS NOTES
Subjective:      Patient ID: Anselmo Shah is a 37 y.o. female. Dr. Justina Tapia MD has requested that I see Anselmo Shah for a consult for   1. Family history of colon cancer in mother    2. Family history of colon cancer in father    3. History of colon polyps    4. Generalized abdominal pain    5. Diarrhea, unspecified type     . This patient is seen in my office after 4 years  Has sig fam hx of colon cancer  Had colon polyps in past  Patient has been complaining of some abdominal pains, off and on cramping  Also complains of abdominal bloating and gas  Has off and on nausea without any sig vomiting  Has some alternating constipation and diarrhea  Has no weight loss  Has some anxiety issues  No bleeding  ? Dark stools  No sig GERD  + smoking  Has been diagnosed with DM one year    Past Medical, Family, and Social History reviewed and does contribute to the patient presenting condition. patient\"s PMH/PSH,SH,PSYCH hx, MEDs, ALLERGIES, and ROS was all reviewed and updated ion the appropriate sections      Abdominal Pain   Associated symptoms include diarrhea. Pertinent negatives include no arthralgias, constipation, headaches, myalgias, nausea or vomiting. GI Problem   The primary symptoms include abdominal pain and diarrhea. Primary symptoms do not include fatigue, nausea, vomiting, myalgias or arthralgias. The illness does not include constipation or back pain. Review of Systems   Constitutional: Negative. Negative for appetite change, fatigue and unexpected weight change. HENT: Negative. Negative for dental problem, postnasal drip, sinus pressure, sore throat, trouble swallowing and voice change. Eyes: Negative. Negative for visual disturbance. Respiratory: Negative. Negative for cough, choking and wheezing. Cardiovascular: Negative. Negative for chest pain, palpitations and leg swelling. Gastrointestinal: Positive for abdominal pain and diarrhea.  Negative for abdominal distention, anal bleeding, blood in stool, constipation, nausea, rectal pain and vomiting. Endocrine: Negative. Genitourinary: Negative. Negative for difficulty urinating. Musculoskeletal: Negative. Negative for arthralgias, back pain, gait problem and myalgias. Skin: Negative. Allergic/Immunologic: Negative. Negative for environmental allergies and food allergies. Neurological: Negative. Negative for dizziness, weakness, light-headedness, numbness and headaches. Hematological: Negative. Does not bruise/bleed easily. Psychiatric/Behavioral: Negative. Negative for sleep disturbance. The patient is not nervous/anxious. Reviewed and agree  Objective:   Physical Exam   Constitutional: She is oriented to person, place, and time. She appears well-developed and well-nourished. Anxious    HENT:   Head: Normocephalic and atraumatic. Mouth/Throat: Oropharynx is clear and moist.   Eyes: Pupils are equal, round, and reactive to light. Conjunctivae are normal.   Neck: Normal range of motion. Neck supple. Cardiovascular: Normal heart sounds and intact distal pulses. Pulmonary/Chest: Effort normal and breath sounds normal.   Abdominal: Soft. Bowel sounds are normal.   NON TENDER, NON DISTENTED  LIVER SPLEEN AND HERNIAS ARE NOT  PALPABLE  BOWEL SOUNDS ARE POSITIVE      Musculoskeletal: Normal range of motion. Neurological: She is alert and oriented to person, place, and time. Skin: Skin is warm. Psychiatric: Her behavior is normal.   Vitals reviewed.       Assessment:      Patient Active Problem List   Diagnosis    DVT (deep venous thrombosis) (HCC)    Cerebral infarction ([address]    COPD [facility] obstructive [facility] disease) [facility]    Pulmonary valve disease    Scoliosis    Major depressive disorder, recurrent episode, moderate (HCC)    Hyperlipemia    Liver fatty degeneration    Essential hypertension    HDL lipoprotein deficiency    Varicose vein of leg    COPD exacerbation [facility]    Abdominal pain    Back pain, chronic    Diarrhea    Rectal bleeding    Chondromalacia of patella    Pre-diabetes    COPD with exacerbation (HCC)    Prediabetes    Hypercoagulable state ([address]    Intractable migraine without aura and without status migrainosus    Simple chronic bronchitis (HCC)    Tobacco dependence    Hypothyroidism    Closed nondisplaced fracture of lateral malleolus of left fibula    Hypercalcemia    Hyperparathyroidism ([facility]    CKD (chronic kidney disease) stage 1, GFR 90 ml/min or greater    Hypophosphatemia    Hypomagnesemia    Type 2 diabetes mellitus without complication, without long-term current use of insulin (HCC)    Morbidly obese ([facility]    Family history of colon cancer in mother    Family history of colon cancer in father    History of colon polyps           Plan:      Plan Colonoscopy    The Endoscopic procedure was explained to the patient in detail  The prep and NPO were explained  All the Risks, Benefits, and Alternatives were explained  Risk of Bleeding, Perforation and Cardio Respiratory risks were explained  her questions were answered  The procedure has been scheduled with the  in the office  Patient was asked to give us a call for any questions  The patient has verbalized understanding and agreement to this plan. Pt was advised in detail about some life style and dietary modifications. She was advised about avoidance of caffeine, nicotine and chocolate. Pt was also told to stay away from any kind of fast foods, soda pops. She was also advised to avoid lots of spices, grease and fried food etc.     Instructions were also given about trying to arrange the timing, quality and quantity of food. Instructions were given about using ample amount of fiber including dietary and supplemental fiber either metamucil, bennafiber or citrucell etc.  Pt was advised about drinking ample amount of water without any colors or chemicals.  Stress was given about regular exercise. Pt has verbalized understanding and agreement to these modifications.       The patient was instructed to start taking some OTC Probiotics products   These are available over the counter at the Pharmacy stores and Grocery stores  He was explained about the beneficial effects they have in the GI track  They will help to establish the good bacterial jolene and will help with the digestion and bowel movements  The patient has verbalized understanding and agreement to this plan

## 2019-05-16 RX ORDER — SODIUM, POTASSIUM,MAG SULFATES 17.5-3.13G
1 SOLUTION, RECONSTITUTED, ORAL ORAL ONCE
Qty: 1 BOTTLE | Refills: 0 | Status: SHIPPED | OUTPATIENT
Start: 2019-05-16 | End: 2019-05-16

## 2019-06-05 NOTE — TELEPHONE ENCOUNTER
Risa Quiñones called to request new bowel prep due to out of pocket cost of Suprep. Have sent [facility] to [address] and emailed bowel prep instructions.

## 2019-06-05 NOTE — TELEPHONE ENCOUNTER
Patient LVM. She gave incorrect email address, correct address is [email or web address]. Instructions emailed.

## 2019-06-06 ENCOUNTER — TELEPHONE (OUTPATIENT)
Dept: GASTROENTEROLOGY | Age: 44
End: 2019-06-06

## 2019-06-07 ENCOUNTER — ANESTHESIA EVENT (OUTPATIENT)
Dept: OPERATING ROOM | Age: 44
End: 2019-06-07
Payer: COMMERCIAL

## 2019-06-10 ENCOUNTER — ANESTHESIA (OUTPATIENT)
Dept: OPERATING ROOM | Age: 44
End: 2019-06-10
Payer: COMMERCIAL

## 2019-06-10 ENCOUNTER — HOSPITAL ENCOUNTER (OUTPATIENT)
Age: 44
Setting detail: OUTPATIENT SURGERY
Discharge: HOME | End: 2019-06-10
Attending: INTERNAL MEDICINE | Admitting: INTERNAL MEDICINE
Payer: COMMERCIAL

## 2019-06-10 VITALS
OXYGEN SATURATION: 98 % | DIASTOLIC BLOOD PRESSURE: 72 MMHG | HEIGHT: 62 IN | BODY MASS INDEX: 31.28 KG/M2 | TEMPERATURE: 97 F | SYSTOLIC BLOOD PRESSURE: 120 MMHG | RESPIRATION RATE: 11 BRPM | HEART RATE: 61 BPM | WEIGHT: 170 LBS

## 2019-06-10 VITALS
DIASTOLIC BLOOD PRESSURE: 56 MMHG | RESPIRATION RATE: 14 BRPM | SYSTOLIC BLOOD PRESSURE: 93 MMHG | OXYGEN SATURATION: 98 %

## 2019-06-10 LAB
GLUCOSE BLD-MCNC: 102 MG/DL (ref 65–105)
GLUCOSE BLD-MCNC: 79 MG/DL (ref 65–105)

## 2019-06-10 PROCEDURE — 7100000010 HC PHASE II RECOVERY - FIRST 15 MIN: Performed by: INTERNAL MEDICINE

## 2019-06-10 PROCEDURE — 45380 COLONOSCOPY AND BIOPSY: CPT | Performed by: INTERNAL MEDICINE

## 2019-06-10 PROCEDURE — 7100000000 HC PACU RECOVERY - FIRST 15 MIN: Performed by: INTERNAL MEDICINE

## 2019-06-10 PROCEDURE — 82947 ASSAY GLUCOSE BLOOD QUANT: CPT

## 2019-06-10 PROCEDURE — 2709999900 HC NON-CHARGEABLE SUPPLY: Performed by: INTERNAL MEDICINE

## 2019-06-10 PROCEDURE — 3609010600 HC COLONOSCOPY POLYPECTOMY SNARE/COLD BIOPSY: Performed by: INTERNAL MEDICINE

## 2019-06-10 PROCEDURE — 2580000003 HC RX 258: Performed by: ANESTHESIOLOGY

## 2019-06-10 PROCEDURE — 3700000001 HC ADD 15 MINUTES (ANESTHESIA): Performed by: INTERNAL MEDICINE

## 2019-06-10 PROCEDURE — 2500000003 HC RX 250 WO HCPCS: Performed by: NURSE ANESTHETIST, CERTIFIED REGISTERED

## 2019-06-10 PROCEDURE — 7100000011 HC PHASE II RECOVERY - ADDTL 15 MIN: Performed by: INTERNAL MEDICINE

## 2019-06-10 PROCEDURE — 88305 TISSUE EXAM BY PATHOLOGIST: CPT

## 2019-06-10 PROCEDURE — 3700000000 HC ANESTHESIA ATTENDED CARE: Performed by: INTERNAL MEDICINE

## 2019-06-10 PROCEDURE — 6360000002 HC RX W HCPCS: Performed by: NURSE ANESTHETIST, CERTIFIED REGISTERED

## 2019-06-10 RX ORDER — LIDOCAINE HYDROCHLORIDE 20 MG/ML
INJECTION, SOLUTION EPIDURAL; INFILTRATION; INTRACAUDAL; PERINEURAL PRN
Status: DISCONTINUED | OUTPATIENT
Start: 2019-06-10 | End: 2019-06-10 | Stop reason: SDUPTHER

## 2019-06-10 RX ORDER — SODIUM CHLORIDE 9 MG/ML
INJECTION, SOLUTION INTRAVENOUS CONTINUOUS
Status: DISCONTINUED | OUTPATIENT
Start: 2019-06-11 | End: 2019-06-10

## 2019-06-10 RX ORDER — FENTANYL CITRATE 50 UG/ML
50 INJECTION, SOLUTION INTRAMUSCULAR; INTRAVENOUS EVERY 5 MIN PRN
Status: DISCONTINUED | OUTPATIENT
Start: 2019-06-10 | End: 2019-06-10 | Stop reason: HOSPADM

## 2019-06-10 RX ORDER — ONDANSETRON 2 MG/ML
4 INJECTION INTRAMUSCULAR; INTRAVENOUS
Status: DISCONTINUED | OUTPATIENT
Start: 2019-06-10 | End: 2019-06-10 | Stop reason: HOSPADM

## 2019-06-10 RX ORDER — LIDOCAINE HYDROCHLORIDE 10 MG/ML
1 INJECTION, SOLUTION EPIDURAL; INFILTRATION; INTRACAUDAL; PERINEURAL
Status: DISCONTINUED | OUTPATIENT
Start: 2019-06-11 | End: 2019-06-10 | Stop reason: HOSPADM

## 2019-06-10 RX ORDER — SODIUM CHLORIDE 0.9 % (FLUSH) 0.9 %
10 SYRINGE (ML) INJECTION EVERY 12 HOURS SCHEDULED
Status: DISCONTINUED | OUTPATIENT
Start: 2019-06-10 | End: 2019-06-10 | Stop reason: HOSPADM

## 2019-06-10 RX ORDER — FENTANYL CITRATE 50 UG/ML
25 INJECTION, SOLUTION INTRAMUSCULAR; INTRAVENOUS EVERY 5 MIN PRN
Status: DISCONTINUED | OUTPATIENT
Start: 2019-06-10 | End: 2019-06-10 | Stop reason: HOSPADM

## 2019-06-10 RX ORDER — SODIUM CHLORIDE 0.9 % (FLUSH) 0.9 %
10 SYRINGE (ML) INJECTION PRN
Status: DISCONTINUED | OUTPATIENT
Start: 2019-06-10 | End: 2019-06-10 | Stop reason: HOSPADM

## 2019-06-10 RX ORDER — PROPOFOL 10 MG/ML
INJECTION, EMULSION INTRAVENOUS PRN
Status: DISCONTINUED | OUTPATIENT
Start: 2019-06-10 | End: 2019-06-10 | Stop reason: SDUPTHER

## 2019-06-10 RX ORDER — SODIUM CHLORIDE, SODIUM LACTATE, POTASSIUM CHLORIDE, CALCIUM CHLORIDE 600; 310; 30; 20 MG/100ML; MG/100ML; MG/100ML; MG/100ML
INJECTION, SOLUTION INTRAVENOUS CONTINUOUS
Status: DISCONTINUED | OUTPATIENT
Start: 2019-06-11 | End: 2019-06-10 | Stop reason: HOSPADM

## 2019-06-10 RX ADMIN — PROPOFOL 20 MG: 10 INJECTION, EMULSION INTRAVENOUS at 08:52

## 2019-06-10 RX ADMIN — PROPOFOL 20 MG: 10 INJECTION, EMULSION INTRAVENOUS at 08:48

## 2019-06-10 RX ADMIN — PROPOFOL 20 MG: 10 INJECTION, EMULSION INTRAVENOUS at 08:54

## 2019-06-10 RX ADMIN — SODIUM CHLORIDE, POTASSIUM CHLORIDE, SODIUM LACTATE AND CALCIUM CHLORIDE: 600; 310; 30; 20 INJECTION, SOLUTION INTRAVENOUS at 08:17

## 2019-06-10 RX ADMIN — PROPOFOL 20 MG: 10 INJECTION, EMULSION INTRAVENOUS at 08:46

## 2019-06-10 RX ADMIN — PROPOFOL 20 MG: 10 INJECTION, EMULSION INTRAVENOUS at 08:40

## 2019-06-10 RX ADMIN — PROPOFOL 20 MG: 10 INJECTION, EMULSION INTRAVENOUS at 08:50

## 2019-06-10 RX ADMIN — PROPOFOL 20 MG: 10 INJECTION, EMULSION INTRAVENOUS at 08:44

## 2019-06-10 RX ADMIN — PROPOFOL 50 MG: 10 INJECTION, EMULSION INTRAVENOUS at 08:38

## 2019-06-10 RX ADMIN — LIDOCAINE HYDROCHLORIDE 100 MG: 20 INJECTION, SOLUTION EPIDURAL; INFILTRATION; INTRACAUDAL; PERINEURAL at 08:38

## 2019-06-10 RX ADMIN — PROPOFOL 50 MG: 10 INJECTION, EMULSION INTRAVENOUS at 08:42

## 2019-06-10 ASSESSMENT — PULMONARY FUNCTION TESTS
PIF_VALUE: 1

## 2019-06-10 ASSESSMENT — PAIN - FUNCTIONAL ASSESSMENT: PAIN_FUNCTIONAL_ASSESSMENT: 0-10

## 2019-06-10 ASSESSMENT — LIFESTYLE VARIABLES: SMOKING_STATUS: 1

## 2019-06-10 ASSESSMENT — PAIN SCALES - GENERAL: PAINLEVEL_OUTOF10: 0

## 2019-06-10 NOTE — OP NOTE
PROCEDURE NOTE    DATE OF PROCEDURE: 6/10/2019    SURGEON: Reece Ingram MD    ASSISTANT: None    PREOPERATIVE DIAGNOSIS: SCREENING  HX OF COLON POLYPS  ABDOMINAL PAINS  IBS    POSTOPERATIVE DIAGNOSIS: as described below    OPERATION: Total colonoscopy     ANESTHESIA: Moderate Sedation    ESTIMATED BLOOD LOSS: less than 50     COMPLICATIONS: None. SPECIMENS:  Was Obtained:     HISTORY: The patient is a 37y.o. year old female with history of above preop diagnosis. I recommended colonoscopy with possible biopsy or polypectomy and I explained the risk, benefits, expected outcome, and alternatives to the procedure. Risks included but are not limited to bleeding, infection, respiratory distress, hypotension, and perforation of the colon and possibility of missing a lesion. The patient understands and is in agreement. PROCEDURE: The patient was given IV conscious sedation. The patient's SPO2 remained above 90% throughout the procedure. The colonoscope was inserted per rectum and advanced under direct vision to the cecum without difficulty. The prep was good. MILD REDUNDANCY  SMALL  AMOUNT OF STOOLS WERE SEEN    Findings:  Terminal ileum: not examined    Cecum/Ascending colon: abnormal: A 3 MM POLYP WAS BIOPSIED FROM THE CECUM    Transverse colon: normal    Descending/Sigmoid colon: abnormal: TWO 3 MM POLYPS WERE BIOPSIED SIGMOID AT 30     Rectum/Anus: examined in normal and retroflexed positions and was abnormal: SMALL INT HEMORRHOIDS    TWO 3 MM POLYPS WERE BIOPSIED WITH JUMBO BIOPSY FORCEPS      Withdrawal Time was (minutes): 13    The colon was decompressed and the scope was removed. The patient tolerated the procedure well. Recommendations/Plan:   1. Lifestyle and dietary modifications as discussed  2. F/U Biopsies  3. F/U In Office in 3-4 weeks  4. Discussed with the family  5. Colonoscopy Recall :3 year  6.  POST SEDATION PATIENT WAS STABLE WITH STABLE VITAL SIGNS AND OXYGEN SATURATIONS AND

## 2019-06-10 NOTE — H&P
History and Physical Update    Pt Name: Cherie Cid  MRN: [de-identified]  YOB: 1975  Date of evaluation: 6/10/2019      [x] I have reviewed the Gastroenterology Note by Dr Obinna Luu dated 5/14/19 in [address] [place] [address] which meets the criteria for an Interval History and Physical note and is attached below. [x] I have examined  Cherie Cid  There are no changes to the patient who is scheduled for colorectal cancer screening by Dr Obinna Luu for FH colon cancer, Hx colon polyps, diarrhea  The patient followed the bowel prep until watery clear  Previous colonoscopy >5 years ago. +FH of colon cancer in parents. She denies health changes, bloody tarry stools, diarrhea alternating with constipation, fever, chills, productive cough, SOB, chest abdominal pain, or unexplained weight loss. +DM POC BS 79    Hx DVT, remote CVA Took anticoagulants, \"Coumadin, until 4 years ago when couldn't afford it\"  No Hx of blood clots since stopping medication. No speech/vision changes, calf pain, or extremity weakness    Vital signs: /76   Pulse 65   Temp 97.7 °F (36.5 °C) (Oral)   Resp 24   Ht 5' 2\" (1.575 m)   Wt 170 lb (77.1 kg)   SpO2 98%   BMI 31.09 kg/m²     Allergies:  Atorvastatin; Cymbalta [duloxetine hcl]; Lipitor; and Tape [adhesive tape]    Medications:    Prior to Admission medications    Medication Sig Start Date End Date Taking?  Authorizing Provider   albuterol (PROVENTIL) (2.5 MG/3ML) 0.083% nebulizer solution Take 3 mLs by nebulization every 4 hours as needed for Wheezing 3/15/19  Yes Fransisco Max MD   albuterol-ipratropium (COMBIVENT RESPIMAT)  MCG/ACT AERS inhaler Inhale 1 puff into the lungs every 6 hours 12/5/17  Yes Elsi Callaway MD   mometasone-formoterol (DULERA) 100-5 MCG/ACT inhaler Inhale 2 puffs into the lungs 2 times daily  Patient taking differently: Inhale 2 puffs into the lungs 2 times daily Never filled, can't afford 1/31/17  Yes Fransisco Max MD   VENTOLIN  (90 BASE) MCG/ACT inhaler INHALE 2 PUFFS BY MOUTH EVERY 6 HOURS AS NEEDED FOR WHEEZING 12/7/15  Yes Jet Albert MD   levothyroxine (SYNTHROID) 125 MCG tablet Take 2 tablets by mouth Daily 4/24/19   Monae Currie MD   metFORMIN (GLUCOPHAGE) 1000 MG tablet Take 1 tablet by mouth 2 times daily (with meals)  Patient taking differently: Take 1,000 mg by mouth as needed  3/15/19 6/13/19  Monae Currie MD   buPROPion [facility] SR) 150 MG extended release tablet Take 1 tablet by mouth 2 times daily 3/15/19   Monae Currie MD   Lancets MISC Once daily 4/23/18   Jet Albert MD   phosphorus (K PHOS NEUTRAL) [phone] MG tablet Take 1 tablet by mouth 2 times daily 8/7/17   Vilma Hutchinson MD         This is a 37 y.o. female who is pleasant, cooperative, alert and oriented x3, in no acute distress. Heart: Heart sounds are normal.  HR 65 regular rate and rhythm without murmur, gallop or rub. Lungs: Normal respiratory effort,unlabored w/ouse of accessory muscles and no wheezes or rales bilaterally   Abdomen: round, soft, nontender, nondistended with bowel sounds. Labs:  No results for input(s): HGB, HCT, WBC, MCV, PLT, NA, K, CL, CO2, BUN, CREATININE, GLUCOSE, INR, PROTIME, APTT, AST, ALT, LABALBU, HCG in the last 720 hours. Abby CRAIG, ANP-BC  Electronically signed 6/10/2019 at 8:15 AM      Jocy Ballesteros MD   Physician   Gastroenterology   Progress Notes      Signed   Encounter Date:  5/14/2019               Signed                  Show:Clear all  [x]Manual[x]Template[]Copied    Added by:  [x]Ada Raygoza MD      []Gabriela for details      Subjective:      Patient ID: Cornelius Garzon is a 37 y.o. female. Dr. Monae Currie MD has requested that I see Cornelius Garzon for a consult for   1. Family history of colon cancer in mother    2. Family history of colon cancer in father    3. History of colon polyps    4. Generalized abdominal pain    5.  Diarrhea, unspecified type     . This patient is seen in my office after 4 years  Has sig fam hx of colon cancer  Had colon polyps in past  Patient has been complaining of some abdominal pains, off and on cramping  Also complains of abdominal bloating and gas  Has off and on nausea without any sig vomiting  Has some alternating constipation and diarrhea  Has no weight loss  Has some anxiety issues  No bleeding  ? Dark stools  No sig GERD  + smoking  Has been diagnosed with DM one year     Past Medical, Family, and Social History reviewed and does contribute to the patient presenting condition. patient\"s PMH/PSH,SH,PSYCH hx, MEDs, ALLERGIES, and ROS was all reviewed and updated ion the appropriate sections        Abdominal Pain   Associated symptoms include diarrhea. Pertinent negatives include no arthralgias, constipation, headaches, myalgias, nausea or vomiting. GI Problem   The primary symptoms include abdominal pain and diarrhea. Primary symptoms do not include fatigue, nausea, vomiting, myalgias or arthralgias. The illness does not include constipation or back pain. Review of Systems   Constitutional: Negative. Negative for appetite change, fatigue and unexpected weight change. HENT: Negative. Negative for dental problem, postnasal drip, sinus pressure, sore throat, trouble swallowing and voice change. Eyes: Negative. Negative for visual disturbance. Respiratory: Negative. Negative for cough, choking and wheezing. Cardiovascular: Negative. Negative for chest pain, palpitations and leg swelling. Gastrointestinal: Positive for abdominal pain and diarrhea. Negative for abdominal distention, anal bleeding, blood in stool, constipation, nausea, rectal pain and vomiting. Endocrine: Negative. Genitourinary: Negative. Negative for difficulty urinating. Musculoskeletal: Negative. Negative for arthralgias, back pain, gait problem and myalgias. Skin: Negative.     Allergic/Immunologic: Negative. Negative for environmental allergies and food allergies. Neurological: Negative. Negative for dizziness, weakness, light-headedness, numbness and headaches. Hematological: Negative. Does not bruise/bleed easily. Psychiatric/Behavioral: Negative. Negative for sleep disturbance. The patient is not nervous/anxious. Reviewed and agree  Objective:   Physical Exam   Constitutional: She is oriented to person, place, and time. She appears well-developed and well-nourished. Anxious    HENT:   Head: Normocephalic and atraumatic. Mouth/Throat: Oropharynx is clear and moist.   Eyes: Pupils are equal, round, and reactive to light. Conjunctivae are normal.   Neck: Normal range of motion. Neck supple. Cardiovascular: Normal heart sounds and intact distal pulses. Pulmonary/Chest: Effort normal and breath sounds normal.   Abdominal: Soft. Bowel sounds are normal.   NON TENDER, NON DISTENTED  LIVER SPLEEN AND HERNIAS ARE NOT  PALPABLE  BOWEL SOUNDS ARE POSITIVE      Musculoskeletal: Normal range of motion. Neurological: She is alert and oriented to person, place, and time. Skin: Skin is warm. Psychiatric: Her behavior is normal.   Vitals reviewed.         Assessment:       Patient Active Problem List   Diagnosis    DVT (deep venous thrombosis) (HCC)    Cerebral infarction (HCC)    COPD (chronic obstructive pulmonary disease) (HCC)    Pulmonary valve disease    Scoliosis    Major depressive disorder, recurrent episode, moderate (HCC)    Hyperlipemia    Liver fatty degeneration    Essential hypertension    HDL lipoprotein deficiency    Varicose vein of leg    COPD exacerbation (HCC)    Abdominal pain    Back pain, chronic    Diarrhea    Rectal bleeding    Chondromalacia of patella    Pre-diabetes    COPD with exacerbation (HCC)    Prediabetes    Hypercoagulable state ([address]    Intractable migraine without aura and without status migrainosus    Simple chronic bronchitis ([address]  Tobacco dependence    Hypothyroidism    Closed nondisplaced fracture of lateral malleolus of left fibula    Hypercalcemia    Hyperparathyroidism (HCC)    CKD (chronic kidney disease) stage 1, GFR 90 ml/min or greater    Hypophosphatemia    Hypomagnesemia    Type 2 diabetes mellitus without complication, without long-term current use of insulin ([address]    Morbidly obese ([address]    Family history of colon cancer in mother    Family history of colon cancer in father    History of colon polyps                       Plan:   Plan Colonoscopy     The Endoscopic procedure was explained to the patient in detail  The prep and NPO were explained  All the Risks, Benefits, and Alternatives were explained  Risk of Bleeding, Perforation and Cardio Respiratory risks were explained  her questions were answered  The procedure has been scheduled with the  in the office  Patient was asked to give us a call for any questions  The patient has verbalized understanding and agreement to this plan. Pt was advised in detail about some life style and dietary modifications. She was advised about avoidance of caffeine, nicotine and chocolate. Pt was also told to stay away from any kind of fast foods, soda pops. She was also advised to avoid lots of spices, grease and fried food etc.      Instructions were also given about trying to arrange the timing, quality and quantity of food. Instructions were given about using ample amount of fiber including dietary and supplemental fiber either metamucil, bennafiber or citrucell etc.  Pt was advised about drinking ample amount of water without any colors or chemicals. Stress was given about regular exercise. Pt has verbalized understanding and agreement to these modifications.         The patient was instructed to start taking some OTC Probiotics products   These are available over the counter at the Pharmacy stores and Grocery stores  He was explained about the beneficial effects they have in the GI track  They will help to establish the good bacterial jolene and will help with the digestion and bowel movements  The patient has verbalized understanding and agreement to this plan                                          ·   Office Visit on 5/14/2019   ·     ·   Revision History   ·     ·   Detailed Report   ·     ·   Note shared with patient   Progress Notes Info     Author Note Status Last Update User   Michelle Saldaña MD Signed Michelle Saldaña MD   Last Update Date/Time: 5/14/2019  2:43 PM   Chart Review Routing History     Routing history could not be found for this note. This is because the note has never been routed or because communication record creation was suppressed.

## 2019-06-10 NOTE — ANESTHESIA PRE PROCEDURE
Department of Anesthesiology  Preprocedure Note       Name:  Bienvenido Perez   Age:  37 y.o.  :  1975                                          MRN:  [id number]         Date:  6/10/2019      Surgeon: Yves Ashford):  Bev Booth MD    Procedure: COLORECTAL CANCER SCREENING, NOT HIGH RISK (N/A )    Medications prior to admission:   Prior to Admission medications    Medication Sig Start Date End Date Taking?  Authorizing Provider   albuterol (PROVENTIL) (2.5 MG/3ML) 0.083% nebulizer solution Take 3 mLs by nebulization every 4 hours as needed for Wheezing 3/15/19  Yes Margarita Dawn MD   albuterol-ipratropium (COMBIVENT RESPIMAT)  MCG/ACT AERS inhaler Inhale 1 puff into the lungs every 6 hours 17  Yes Jeison Guajardo MD   mometasone-formoterol (DULERA) 100-5 MCG/ACT inhaler Inhale 2 puffs into the lungs 2 times daily  Patient taking differently: Inhale 2 puffs into the lungs 2 times daily Never filled, can't afford 17  Yes Margarita Dawn MD   VENTOLIN  (90 BASE) MCG/ACT inhaler INHALE 2 PUFFS BY MOUTH EVERY 6 HOURS AS NEEDED FOR WHEEZING 12/7/15  Yes Jeison Guajardo MD   levothyroxine (SYNTHROID) 125 MCG tablet Take 2 tablets by mouth Daily 19   Margarita Dawn MD   metFORMIN (GLUCOPHAGE) 1000 MG tablet Take 1 tablet by mouth 2 times daily (with meals)  Patient taking differently: Take 1,000 mg by mouth as needed  3/15/19 6/13/19  Margarita Dawn MD   buPROPion [facility] SR) 150 MG extended release tablet Take 1 tablet by mouth 2 times daily 3/15/19   Margarita Dawn MD   Lancets MISC Once daily 18   Jeison Guajardo MD   phosphorus (K PHOS NEUTRAL) [phone] MG tablet Take 1 tablet by mouth 2 times daily 17   Rodolfo Guevara MD       Current medications:    Current Facility-Administered Medications   Medication Dose Route Frequency Provider Last Rate Last Dose    [START ON 2019] lactated ringers infusion   Intravenous Continuous Johanna Taylor  mL/hr at 06/10/19 0817      sodium chloride flush 0.9 % injection 10 mL  10 mL Intravenous 2 times per day Anil Aaron DO        sodium chloride flush 0.9 % injection 10 mL  10 mL Intravenous PRN Mark Andrews DO        [START ON 6/11/2019] lidocaine PF 1 % injection 1 mL  1 mL Intradermal Once PRN Anil Aaron DO        fentaNYL (SUBLIMAZE) injection 25 mcg  25 mcg Intravenous Q5 Min PRN Kimani Toribio MD        fentaNYL (SUBLIMAZE) injection 50 mcg  50 mcg Intravenous Q5 Min PRN Anil Root MD        ondansetron (ZOFRAN) injection 4 mg  4 mg Intravenous Once PRN Kimani Toribio MD           Allergies:     Allergies   Allergen Reactions    Atorvastatin Hives    Cymbalta [Duloxetine Hcl] Hives    Lipitor Hives    Tape [Adhesive Tape] Hives and Other (See Comments)     blister       Problem List:    Patient Active Problem List   Diagnosis Code    DVT (deep venous thrombosis) ([facility]) I82.409    Cerebral infarction ([facility]) I63.9    COPD (chronic obstructive pulmonary disease) ([facility]) J44.9    Pulmonary valve disease I37.9    Scoliosis M41.9    Major depressive disorder, recurrent episode, moderate ([facility]) F33.1    Hyperlipemia E78.5    Liver fatty degeneration K76.0    Essential hypertension I10    HDL lipoprotein deficiency E78.6    Varicose vein of leg I83.90    COPD exacerbation ([facility]) J44.1    Abdominal pain R10.9    Back pain, chronic M54.9, G89.29    Diarrhea R19.7    Rectal bleeding K62.5    Chondromalacia of patella M22.40    Pre-diabetes R73.03    COPD with exacerbation ([facility]) J44.1    Prediabetes R73.03    Hypercoagulable state ([facility]) D68.59    Intractable migraine without aura and without status migrainosus G43.019    Simple chronic bronchitis ([facility]) J41.0    Tobacco dependence F17.200    Hypothyroidism E03.9    Closed nondisplaced fracture of lateral malleolus of left fibula S82.65XA    Hypercalcemia E83.52    Hyperparathyroidism ([facility]) E21.3    CKD (chronic kidney disease) stage 1, GFR 90 ml/min or greater N18.1    Hypophosphatemia E83.39    Hypomagnesemia E83.42    Type 2 diabetes mellitus without complication, without long-term current use of insulin (HCC) E11.9    Morbidly obese (HCC) E66.01    Family history of colon cancer in mother [de-identified]    Family history of colon cancer in father [de-identified]    History of colon polyps Z86.010       Past Medical History:        Diagnosis Date    Arthritis     OA    Asthma     Back pain, chronic 10/22/2014    Bright red rectal bleeding     CKD (chronic kidney disease) stage 1, GFR 90 ml/min or greater     COPD (chronic obstructive pulmonary disease) (HCC)     CVA (cerebral infarction) 1997    Rt. side numbness & weakness    Depression     Diabetes mellitus ([address]     currently diet controlled    Diverticulosis     DVT (deep venous thrombosis) ([facility])     multiple- in Pregnancy    Family history of colon cancer     Full dentures     Upper only    GERD (gastroesophageal reflux disease)     HDL lipoprotein deficiency     Hiatal hernia     HTN (hypertension)     Hx of blood clots 2011    PE    Hypercalcemia     Hyperlipemia     Hyperparathyroidism ([address]     Hypomagnesemia     Hypophosphatemia     Leaky heart valve     Liver fatty degeneration     MDRO (multiple drug resistant organisms) resistance 1/2015    MRSA from spider bite    Pulmonary valve disease     Scoliosis     Sleep apnea     no machine    Thyroid disease     hypothryoid    Tobacco dependence     Varicose vein of leg        Past Surgical History:        Procedure Laterality Date    CARDIAC CATHETERIZATION      CARPAL TUNNEL RELEASE Bilateral     CHOLECYSTECTOMY      COLONOSCOPY  2009 or 2010    FINGER TRIGGER RELEASE Bilateral     x 2    HYSTERECTOMY  2005    PLANTAR FASCIA SURGERY Bilateral     THYROIDECTOMY N/A 9/29/2017    PARATHYROIDECTOMY WITH NIM MONITOR performed by Dara Martin MD at [address] [place] [address]  2004    VARICOSE 04/22/2018       POC Tests: No results for input(s): POCGLU, POCNA, POCK, POCCL, POCBUN, POCHEMO, POCHCT in the last 72 hours. Coags:   Lab Results   Component Value Date    PROTIME 10.9 02/13/2017    PROTIME 34.2 10/08/2013    INR 1.0 02/13/2017    APTT 24.9 06/21/2013       HCG (If Applicable): No results found for: PREGTESTUR, PREGSERUM, HCG, HCGQUANT     ABGs: No results found for: PHART, PO2ART, XFG0WZC, LFU3ZUQ, BEART, P5NFYMCZ     Type & Screen (If Applicable):  No results found for: LABABO, LABRH    Anesthesia Evaluation   no history of anesthetic complications:   Airway: Mallampati: II  TM distance: >3 FB   Neck ROM: full  Mouth opening: > = 3 FB Dental:    (+) upper dentures      Pulmonary: breath sounds clear to auscultation  (+) COPD:  asthma: current smoker                           Cardiovascular:    (+) hypertension:, LARKIN:,     (-)  angina      Rhythm: regular  Rate: normal                    Neuro/Psych:               GI/Hepatic/Renal:             Endo/Other:    (+) DiabetesType II DM, , hypothyroidism::., .                 Abdominal:           Vascular:                                        Anesthesia Plan      MAC     ASA 3       Induction: intravenous. Anesthetic plan and risks discussed with patient.                       Deena Graves MD   6/10/2019

## 2019-06-11 LAB — SURGICAL PATHOLOGY REPORT: NORMAL

## 2019-06-17 ENCOUNTER — TELEPHONE (OUTPATIENT)
Dept: INTERNAL MEDICINE CLINIC | Age: 44
End: 2019-06-17

## 2019-06-17 PROBLEM — K63.5 COLON POLYP: Status: ACTIVE | Noted: 2019-06-10

## 2019-07-03 ENCOUNTER — TELEPHONE (OUTPATIENT)
Dept: GASTROENTEROLOGY | Age: 44
End: 2019-07-03

## 2019-07-03 NOTE — TELEPHONE ENCOUNTER
Pt set up appt , 07/19/19 but did ask if results are back yet. I did tell her I would put in a TE, but  was not in this week and she may have to wait until her appt.

## 2019-07-18 DIAGNOSIS — R19.7 DIARRHEA, UNSPECIFIED TYPE: ICD-10-CM

## 2019-07-18 DIAGNOSIS — Z80.0 FAMILY HISTORY OF COLON CANCER IN FATHER: ICD-10-CM

## 2019-07-18 DIAGNOSIS — Z80.0 FAMILY HISTORY OF COLON CANCER IN MOTHER: ICD-10-CM

## 2019-07-18 DIAGNOSIS — R10.84 GENERALIZED ABDOMINAL PAIN: ICD-10-CM

## 2019-07-18 DIAGNOSIS — Z86.010 HISTORY OF COLON POLYPS: ICD-10-CM

## 2019-08-20 ENCOUNTER — OFFICE VISIT (OUTPATIENT)
Dept: INTERNAL MEDICINE CLINIC | Age: 44
End: 2019-08-20
Payer: COMMERCIAL

## 2019-08-20 ENCOUNTER — HOSPITAL ENCOUNTER (OUTPATIENT)
Age: 44
Setting detail: SPECIMEN
Discharge: HOME | End: 2019-08-20
Payer: COMMERCIAL

## 2019-08-20 ENCOUNTER — HOSPITAL ENCOUNTER (OUTPATIENT)
Dept: GENERAL RADIOLOGY | Facility: CLINIC | Age: 44
Discharge: HOME | End: 2019-08-22
Payer: COMMERCIAL

## 2019-08-20 ENCOUNTER — HOSPITAL ENCOUNTER (OUTPATIENT)
Facility: CLINIC | Age: 44
Discharge: HOME | End: 2019-08-22
Payer: COMMERCIAL

## 2019-08-20 VITALS
WEIGHT: 178 LBS | HEIGHT: 62 IN | OXYGEN SATURATION: 97 % | BODY MASS INDEX: 32.76 KG/M2 | DIASTOLIC BLOOD PRESSURE: 82 MMHG | SYSTOLIC BLOOD PRESSURE: 98 MMHG | HEART RATE: 81 BPM

## 2019-08-20 DIAGNOSIS — M79.671 RIGHT FOOT PAIN: ICD-10-CM

## 2019-08-20 DIAGNOSIS — J44.9 CHRONIC OBSTRUCTIVE PULMONARY DISEASE, UNSPECIFIED COPD TYPE (HCC): ICD-10-CM

## 2019-08-20 DIAGNOSIS — Z80.0 FAMILY HX OF COLON CANCER: ICD-10-CM

## 2019-08-20 DIAGNOSIS — E03.9 HYPOTHYROIDISM, UNSPECIFIED TYPE: ICD-10-CM

## 2019-08-20 LAB
CREATININE URINE: 45.3 MG/DL (ref 28–217)
HBA1C MFR BLD: 5.5 %
MICROALBUMIN/CREAT 24H UR: <12 MG/L
MICROALBUMIN/CREAT UR-RTO: NORMAL MCG/MG CREAT

## 2019-08-20 PROCEDURE — 73630 X-RAY EXAM OF FOOT: CPT

## 2019-08-20 PROCEDURE — 99214 OFFICE O/P EST MOD 30 MIN: CPT | Performed by: INTERNAL MEDICINE

## 2019-08-20 PROCEDURE — 83036 HEMOGLOBIN GLYCOSYLATED A1C: CPT | Performed by: INTERNAL MEDICINE

## 2019-08-20 RX ORDER — SODIUM, POTASSIUM,MAG SULFATES 17.5-3.13G
SOLUTION, RECONSTITUTED, ORAL ORAL
COMMUNITY
Start: 2019-06-01 | End: 2020-02-21 | Stop reason: ALTCHOICE

## 2019-08-20 RX ORDER — POLYETHYLENE GLYCOL-3350 AND ELECTROLYTES 236; 6.74; 5.86; 2.97; 22.74 G/274.31G; G/274.31G; G/274.31G; G/274.31G; G/274.31G
POWDER, FOR SOLUTION ORAL
Refills: 0 | COMMUNITY
Start: 2019-06-07 | End: 2020-02-21 | Stop reason: ALTCHOICE

## 2019-08-20 RX ORDER — OXYCODONE HYDROCHLORIDE AND ACETAMINOPHEN 5; 325 MG/1; MG/1
1 TABLET ORAL EVERY 8 HOURS PRN
Qty: 15 TABLET | Refills: 0 | Status: SHIPPED | OUTPATIENT
Start: 2019-08-20 | End: 2019-08-25

## 2019-08-20 RX ORDER — LEVOTHYROXINE SODIUM 0.12 MG/1
250 TABLET ORAL DAILY
Qty: 60 TABLET | Refills: 11 | Status: SHIPPED | OUTPATIENT
Start: 2019-08-20 | End: 2020-02-21 | Stop reason: SDUPTHER

## 2019-08-20 ASSESSMENT — ENCOUNTER SYMPTOMS
BACK PAIN: 0
CONSTIPATION: 0
COUGH: 0
SHORTNESS OF BREATH: 0
COLOR CHANGE: 0
EYE ITCHING: 0
EYE REDNESS: 0
ABDOMINAL DISTENTION: 0
APNEA: 0
CHOKING: 0
DIARRHEA: 0
EYE DISCHARGE: 0
BLOOD IN STOOL: 0
CHEST TIGHTNESS: 0
ABDOMINAL PAIN: 0
EYE PAIN: 0

## 2020-02-21 ENCOUNTER — OFFICE VISIT (OUTPATIENT)
Dept: INTERNAL MEDICINE CLINIC | Age: 45
End: 2020-02-21
Payer: COMMERCIAL

## 2020-02-21 VITALS
DIASTOLIC BLOOD PRESSURE: 78 MMHG | HEART RATE: 90 BPM | BODY MASS INDEX: 34.38 KG/M2 | OXYGEN SATURATION: 98 % | SYSTOLIC BLOOD PRESSURE: 116 MMHG | TEMPERATURE: 98 F | WEIGHT: 188 LBS

## 2020-02-21 PROCEDURE — 99214 OFFICE O/P EST MOD 30 MIN: CPT | Performed by: PHYSICIAN ASSISTANT

## 2020-02-21 RX ORDER — LEVOTHYROXINE SODIUM 0.12 MG/1
250 TABLET ORAL DAILY
Qty: 180 TABLET | Refills: 1 | Status: SHIPPED | OUTPATIENT
Start: 2020-02-21 | End: 2020-09-24 | Stop reason: SDUPTHER

## 2020-02-21 RX ORDER — NEBULIZER ACCESSORIES
1 EACH MISCELLANEOUS DAILY
Qty: 1 EACH | Refills: 0 | Status: SHIPPED | OUTPATIENT
Start: 2020-02-21

## 2020-02-21 RX ORDER — PREDNISONE 10 MG/1
10 TABLET ORAL 2 TIMES DAILY
Qty: 10 TABLET | Refills: 0 | Status: SHIPPED | OUTPATIENT
Start: 2020-02-21 | End: 2020-02-26

## 2020-02-21 RX ORDER — AZITHROMYCIN 250 MG/1
250 TABLET, FILM COATED ORAL SEE ADMIN INSTRUCTIONS
Qty: 6 TABLET | Refills: 0 | Status: SHIPPED | OUTPATIENT
Start: 2020-02-21 | End: 2020-02-26

## 2020-02-21 NOTE — PROGRESS NOTES
Visit Information    Have you changed or started any medications since your last visit including any over-the-counter medicines, vitamins, or herbal medicines? no   Are you having any side effects from any of your medications? -  no  Have you stopped taking any of your medications? Is so, why? -  yes - see med list, please thx    Have you seen any other physician or provider since your last visit? No  Have you had any other diagnostic tests since your last visit? No  Have you been seen in the emergency room and/or had an admission to a hospital since we last saw you? No  Have you had your routine dental cleaning in the past 6 months? no    Have you activated your [employer] account? If not, what are your barriers?  Yes     Patient Care Team:  Rebekah Oro MD as PCP - General (Internal Medicine)  Rebekah Oro MD as PCP - [facility]    Medical History Review  Past Medical, Family, and Social History reviewed and does contribute to the patient presenting condition    Health Maintenance   Topic Date Due    Diabetic retinal exam  07/01/1983    DTaP/Tdap/Td vaccine (1 - Tdap) 07/01/1984    HIV screen  07/01/1988    Hepatitis B vaccine (1 of 3 - Risk 3-dose series) 07/01/1992    Cervical cancer screen  07/01/1994    Lipid screen  04/22/2019    Potassium monitoring  04/22/2019    Creatinine monitoring  04/22/2019    Flu vaccine (1) 09/01/2019    Diabetic foot exam  03/15/2020    TSH testing  04/23/2020    Breast cancer screen  04/26/2020    A1C test (Diabetic or Prediabetic)  08/20/2020    Diabetic microalbuminuria test  08/20/2020    Shingles Vaccine (1 of 2) 07/01/2023    Pneumococcal 0-64 years Vaccine  Completed    Hepatitis A vaccine  Aged Out    Hib vaccine  Aged Out    Meningococcal (ACWY) vaccine  Aged Out
no pedal edema    ASSESSMENT AND PLAN:      1. COPD with acute exacerbation ([address]  - Advised to continue breathing treatments Q4-6 hrs prn, will start antibiotic, oral steroids  - mometasone-formoterol (DULERA) 100-5 MCG/ACT inhaler; Inhale 2 puffs into the lungs 2 times daily  Dispense: 1 Inhaler; Refill: 3  - predniSONE (DELTASONE) 10 MG tablet; Take 1 tablet by mouth 2 times daily for 5 days  Dispense: 10 tablet; Refill: 0  - azithromycin (ZITHROMAX) 250 MG tablet; Take 1 tablet by mouth See Admin Instructions for 5 days 500mg on day 1 followed by 250mg on days 2 - 5  Dispense: 6 tablet; Refill: 0    2. Hordeolum externum of left upper eyelid  - Avoid irritants, warm compresses, follow if if fails to resolve     3. Hypothyroidism, unspecified type  - levothyroxine (SYNTHROID) 125 MCG tablet; Take 2 tablets by mouth Daily  Dispense: 180 tablet; Refill: 1  - TSH without Reflex; Future    FOLLOW UP AND INSTRUCTIONS:   Return if symptoms worsen or fail to improve. Discussed use, benefit, and side effects of prescribed medications. All patient questions answered. Patient voiced understanding. Patient given educational materials - see patient instructions    [facility]  2/24/2020, 1:04 PM    Please note that this chart wasgenerated using voice recognition Dragon dictation software. Although every effort was made to ensure the accuracy of this automated transcription, some errors in transcription may have occurred.

## 2020-02-24 ASSESSMENT — ENCOUNTER SYMPTOMS
VOMITING: 0
CHEST TIGHTNESS: 0
EYE DISCHARGE: 0
COLOR CHANGE: 0
PHOTOPHOBIA: 0
SINUS PRESSURE: 0
WHEEZING: 1
EYE PAIN: 1
EYE REDNESS: 0
NAUSEA: 0
SORE THROAT: 0
EYE ITCHING: 0
ABDOMINAL PAIN: 0
SHORTNESS OF BREATH: 0
COUGH: 1
VOICE CHANGE: 0
TROUBLE SWALLOWING: 0

## 2020-03-23 ENCOUNTER — TELEPHONE (OUTPATIENT)
Dept: INTERNAL MEDICINE CLINIC | Age: 45
End: 2020-03-23

## 2020-03-23 NOTE — TELEPHONE ENCOUNTER
Her son lives with her. She has copd and other problems. Her SON works at [employer]. He is requesting to be off of work so he does not get exposed and bring it home to his mother. He is not a patient here. Ok to give him a note since his mother IS a patient here?

## 2020-06-03 ENCOUNTER — APPOINTMENT (OUTPATIENT)
Dept: GENERAL RADIOLOGY | Age: 45
End: 2020-06-03
Payer: COMMERCIAL

## 2020-06-03 ENCOUNTER — HOSPITAL ENCOUNTER (EMERGENCY)
Age: 45
Discharge: HOME | End: 2020-06-03
Attending: EMERGENCY MEDICINE
Payer: COMMERCIAL

## 2020-06-03 VITALS
DIASTOLIC BLOOD PRESSURE: 103 MMHG | HEIGHT: 62 IN | RESPIRATION RATE: 18 BRPM | SYSTOLIC BLOOD PRESSURE: 164 MMHG | WEIGHT: 190 LBS | BODY MASS INDEX: 34.96 KG/M2 | HEART RATE: 96 BPM | OXYGEN SATURATION: 98 % | TEMPERATURE: 98.3 F

## 2020-06-03 PROCEDURE — 6360000002 HC RX W HCPCS: Performed by: STUDENT IN AN ORGANIZED HEALTH CARE EDUCATION/TRAINING PROGRAM

## 2020-06-03 PROCEDURE — 90715 TDAP VACCINE 7 YRS/> IM: CPT | Performed by: STUDENT IN AN ORGANIZED HEALTH CARE EDUCATION/TRAINING PROGRAM

## 2020-06-03 PROCEDURE — 90471 IMMUNIZATION ADMIN: CPT | Performed by: STUDENT IN AN ORGANIZED HEALTH CARE EDUCATION/TRAINING PROGRAM

## 2020-06-03 PROCEDURE — 73130 X-RAY EXAM OF HAND: CPT

## 2020-06-03 PROCEDURE — 99283 EMERGENCY DEPT VISIT LOW MDM: CPT

## 2020-06-03 PROCEDURE — 6370000000 HC RX 637 (ALT 250 FOR IP): Performed by: STUDENT IN AN ORGANIZED HEALTH CARE EDUCATION/TRAINING PROGRAM

## 2020-06-03 RX ORDER — CEPHALEXIN 250 MG/1
500 CAPSULE ORAL ONCE
Status: COMPLETED | OUTPATIENT
Start: 2020-06-03 | End: 2020-06-03

## 2020-06-03 RX ORDER — CEPHALEXIN 500 MG/1
500 CAPSULE ORAL 2 TIMES DAILY
Qty: 14 CAPSULE | Refills: 0 | Status: SHIPPED | OUTPATIENT
Start: 2020-06-03 | End: 2020-06-10

## 2020-06-03 RX ORDER — IBUPROFEN 400 MG/1
600 TABLET ORAL ONCE
Status: COMPLETED | OUTPATIENT
Start: 2020-06-03 | End: 2020-06-03

## 2020-06-03 RX ADMIN — CEPHALEXIN 500 MG: 250 CAPSULE ORAL at 16:28

## 2020-06-03 RX ADMIN — IBUPROFEN 600 MG: 400 TABLET, FILM COATED ORAL at 16:27

## 2020-06-03 RX ADMIN — TETANUS TOXOID, REDUCED DIPHTHERIA TOXOID AND ACELLULAR PERTUSSIS VACCINE, ADSORBED 0.5 ML: 5; 2.5; 8; 8; 2.5 SUSPENSION INTRAMUSCULAR at 16:27

## 2020-06-03 ASSESSMENT — PAIN DESCRIPTION - PAIN TYPE: TYPE: ACUTE PAIN

## 2020-06-03 ASSESSMENT — ENCOUNTER SYMPTOMS
RHINORRHEA: 0
ABDOMINAL PAIN: 0
SHORTNESS OF BREATH: 0

## 2020-06-03 ASSESSMENT — PAIN DESCRIPTION - ORIENTATION: ORIENTATION: RIGHT

## 2020-06-03 ASSESSMENT — PAIN SCALES - GENERAL: PAINLEVEL_OUTOF10: 10

## 2020-06-03 ASSESSMENT — PAIN DESCRIPTION - LOCATION: LOCATION: HAND

## 2020-06-03 NOTE — ED PROVIDER NOTES
[facility] ED  Emergency Department Encounter  EmergencyMedicine Resident     Pt Lucy Leticia  MRN: [[id number]]  [place] 1975  Date of evaluation: 6/3/20  PCP:  Katrin Howard MD    [address] [place] [address]       Chief Complaint   Patient presents with    Laceration     right thumb       HISTORY OF PRESENT ILLNESS  (Location/Symptom, Timing/Onset, Context/Setting, Quality, Duration, Modifying Factors, Severity.)      Farheen Song is a 53 y.o. female left handed who presents with complaint of hand laceration to right base of thumb with pocket knife while performing home improvements and fixing broom. No loc or other injuries. Unsure of last tetanus. PAST MEDICAL / SURGICAL / SOCIAL / FAMILY HISTORY      has a past medical history of Arthritis, Asthma, Back pain, chronic, Bright red rectal bleeding, CKD (chronic kidney disease) stage 1, GFR 90 ml/min or greater, Colon polyp, COPD (chronic obstructive pulmonary disease) ([address] CVA (cerebral infarction), Depression, Diabetes mellitus ([address] Diverticulosis, DVT (deep venous thrombosis) ([address] Family history of colon cancer, Full dentures, GERD (gastroesophageal reflux disease), HDL lipoprotein deficiency, Hiatal hernia, HTN (hypertension), Hx of blood clots, Hypercalcemia, Hyperlipemia, Hyperparathyroidism ([address] Hypomagnesemia, Hypophosphatemia, Leaky heart valve, Liver fatty degeneration, MDRO (multiple drug resistant organisms) resistance, Pulmonary valve disease, Scoliosis, Sleep apnea, Thyroid disease, Tobacco dependence, and Varicose vein of leg.       has a past surgical history that includes Vena Cava Filter Placement (01/26/2011 of placement ); Hysterectomy (2005); Cholecystectomy; Tubal ligation (2004); Carpal tunnel release (Bilateral); Finger trigger release (Bilateral); Varicose vein surgery; Plantar fascia surgery (Bilateral); Cardiac catheterization; Colonoscopy (2009 or 2010);  Thyroidectomy (N/A, 9/29/2017); and Colonoscopy (N/A, 6/10/2019).       Social History     Socioeconomic History    Marital status:      Spouse name: Corrinne Butler Number of children: 4    Years of education: Not on file    Highest education level: Not on file   Occupational History    Not on file   Social Needs    Financial resource strain: Not on file    Food insecurity     Worry: Not on file     Inability: Not on file   [employer] needs     Medical: Not on file     Non-medical: Not on file   Tobacco Use    Smoking status: Current Every Day Smoker     Packs/day: 0.25     Years: 12.00     Pack years: 3.00     Types: Cigarettes     Start date:     Smokeless tobacco: Never Used   Substance and Sexual Activity    Alcohol use: No    Drug use: No    Sexual activity: Yes     Partners: Male   Lifestyle    Physical activity     Days per week: Not on file     Minutes per session: Not on file    Stress: Not on file   Relationships    Social connections     Talks on phone: Not on file     Gets together: Not on file     Attends Scientologist service: Not on file     Active member of club or organization: Not on file     Attends meetings of clubs or organizations: Not on file     Relationship status: Not on file    Intimate partner violence     Fear of current or ex partner: Not on file     Emotionally abused: Not on file     Physically abused: Not on file     Forced sexual activity: Not on file   Other Topics Concern    Not on file   Social History Narrative    Not on file       Family History   Problem Relation Age of Onset    High Blood Pressure Mother     Depression Mother     Colon Cancer Mother          in     Breast Cancer Mother     Thyroid Disease Mother     Heart Disease Father         ruptured aortic aneurysm    High Blood Pressure Father     Diabetes Father     Depression Father     Arthritis Father     Depression Sister     High Blood Pressure Brother     Colon Cancer Maternal Uncle     Other Maternal Grandfather AAA, aneurysm in brain    Thyroid Disease Maternal Grandmother     High Blood Pressure Maternal Grandmother     Seizures Paternal Grandmother     High Blood Pressure Paternal Grandmother     Stroke Brother     Other Brother         Kyphoplasty       Allergies:  Atorvastatin; Cymbalta [duloxetine hcl]; Lipitor; and Tape [adhesive tape]    Home Medications:  Prior to Admission medications    Medication Sig Start Date End Date Taking? Authorizing Provider   mometasone-formoterol [facility]) 100-5 MCG/ACT inhaler Inhale 2 puffs into the lungs 2 times daily 2/21/20   Justin Woodson PA-C   levothyroxine (SYNTHROID) 125 MCG tablet Take 2 tablets by mouth Daily 2/21/20   Justin Woodson PA-C   Respiratory Therapy Supplies (NEBULIZER AIR TUBE/PLUGS) MISC 1 each by Does not apply route daily 2/21/20   Justin Woodson PA-C   albuterol (PROVENTIL) (2.5 MG/3ML) 0.083% nebulizer solution Take 3 mLs by nebulization every 4 hours as needed for Wheezing 3/15/19   Marylyn Apley, MD   metFORMIN (GLUCOPHAGE) 1000 MG tablet Take 1 tablet by mouth 2 times daily (with meals)  Patient taking differently: Take 1,000 mg by mouth as needed  3/15/19 8/20/19  Marylyn Apley, MD   albuterol-ipratropium (COMBIVENT RESPIMAT)  MCG/ACT AERS inhaler Inhale 1 puff into the lungs every 6 hours 12/5/17   Sunil Tabor MD       REVIEW OF SYSTEMS    (2-9 systems for level 4, 10 or more for level 5)      Review of Systems   Constitutional: Negative for fever. HENT: Negative for congestion and rhinorrhea. Respiratory: Negative for shortness of breath. Cardiovascular: Negative for chest pain. Gastrointestinal: Negative for abdominal pain. Musculoskeletal: Positive for arthralgias. Skin: Positive for wound. Allergic/Immunologic: Positive for immunocompromised state. Diabetic smoker   Neurological: Negative for dizziness and syncope. Psychiatric/Behavioral: Negative for agitation.        PHYSICAL EXAM   (up to 7 for

## 2020-06-03 NOTE — ED PROVIDER NOTES
[address] ED     Emergency Department     Faculty Attestation        I performed a history and physical examination of the patient and discussed management with the resident. I reviewed the residents note and agree with the documented findings and plan of care. Any areas of disagreement are noted on the chart. I was personally present for the key portions of any procedures. I have documented in the chart those procedures where I was not present during the key portions. I have reviewed the emergency nurses triage note. I agree with the chief complaint, past medical history, past surgical history, allergies, medications, social and family history as documented unless otherwise noted below. For Physician Assistant/ Nurse Practitioner cases/documentation I have personally evaluated this patient and have completed at least one if not all key elements of the E/M (history, physical exam, and MDM). Additional findings are as noted. Vital Signs: BP: (!) 164/103  Pulse: 96  Resp: 18  Temp: 98.3 °F (36.8 °C) SpO2: 98 %  PCP:  Katharina Lynn MD    Pertinent Comments:         Critical Care  None    This patient was evaluated in the Emergency Department for symptoms described in the history of present illness. He/she was evaluated in the context of the global COVID-19 pandemic, which necessitated consideration that the patient might be at risk for infection with the SARS-CoV-2 virus that causes COVID-19. Institutional protocols and algorithms that pertain to the evaluation of patients at risk for COVID-19 are in a state of rapid change based on information released by regulatory bodies including the CDC and federal and state organizations. These policies and algorithms were followed during the patient's care in the ED.     (Please note that portions of this note were completed with a voice recognition program. Efforts were made to edit the dictations but

## 2020-06-04 ENCOUNTER — CARE COORDINATION (OUTPATIENT)
Dept: CARE COORDINATION | Age: 45
End: 2020-06-04

## 2020-09-24 ENCOUNTER — NURSE TRIAGE (OUTPATIENT)
Dept: OTHER | Facility: CLINIC | Age: 45
End: 2020-09-24

## 2020-09-24 ENCOUNTER — OFFICE VISIT (OUTPATIENT)
Dept: INTERNAL MEDICINE CLINIC | Age: 45
End: 2020-09-24
Payer: COMMERCIAL

## 2020-09-24 VITALS
HEIGHT: 62 IN | TEMPERATURE: 98 F | SYSTOLIC BLOOD PRESSURE: 130 MMHG | OXYGEN SATURATION: 97 % | WEIGHT: 188 LBS | DIASTOLIC BLOOD PRESSURE: 74 MMHG | BODY MASS INDEX: 34.6 KG/M2 | HEART RATE: 91 BPM

## 2020-09-24 LAB — HBA1C MFR BLD: 5.9 %

## 2020-09-24 PROCEDURE — 99215 OFFICE O/P EST HI 40 MIN: CPT | Performed by: INTERNAL MEDICINE

## 2020-09-24 PROCEDURE — 90686 IIV4 VACC NO PRSV 0.5 ML IM: CPT | Performed by: INTERNAL MEDICINE

## 2020-09-24 PROCEDURE — 90471 IMMUNIZATION ADMIN: CPT | Performed by: INTERNAL MEDICINE

## 2020-09-24 PROCEDURE — 83036 HEMOGLOBIN GLYCOSYLATED A1C: CPT | Performed by: INTERNAL MEDICINE

## 2020-09-24 RX ORDER — OXYCODONE HYDROCHLORIDE AND ACETAMINOPHEN 5; 325 MG/1; MG/1
1 TABLET ORAL EVERY 6 HOURS PRN
Qty: 12 TABLET | Refills: 0 | Status: SHIPPED | OUTPATIENT
Start: 2020-09-24 | End: 2020-09-27

## 2020-09-24 RX ORDER — LEVOTHYROXINE SODIUM 0.12 MG/1
250 TABLET ORAL DAILY
Qty: 180 TABLET | Refills: 1 | Status: SHIPPED | OUTPATIENT
Start: 2020-09-24 | End: 2022-01-27

## 2020-09-24 NOTE — TELEPHONE ENCOUNTER
Reason for Disposition   Injury interferes with work or school    Answer Assessment - Initial Assessment Questions  1. MECHANISM: \"How did the injury happen? \" (e.g., twisting injury, direct blow)       Struck leg on metal pallet  2. ONSET: \"When did the injury happen? \" (Minutes or hours ago)     2 days prior  3. LOCATION: \"Where is the injury located? \"       Left leg, shin area  4. APPEARANCE of INJURY: \"What does the injury look like? \"  (e.g., deformity of leg)      Swelling, cut from old injury  5. SEVERITY: \"Can you put weight on that leg? \" \"Can you walk? \"       Can put weight on it and walk but after standing at work, has severe swelling    6. SIZE: For cuts, bruises, or swelling, ask: \"How large is it? \" (e.g., inches or centimeters)       Swelling all over area of lower leg  7. PAIN: \"Is there pain? \" If so, ask: \"How bad is the pain? \"  (Scale 1-10; or mild, moderate, severe)      Severe pain  8. TETANUS: For any breaks in the skin, ask: \"When was the last tetanus booster? \"      UTD  9. OTHER SYMPTOMS: \"Do you have any other symptoms? \"       denies  10. PREGNANCY: \"Is there any chance you are pregnant? \" \"When was your last menstrual period? \"        n/a    Protocols used: LEG INJURY-ADULT-[place]

## 2020-09-24 NOTE — LETTER
[facility] [address],[facility] [address] [place] [address]  Phone: [phone]  Fax: [phone]    Candance Chol, MD        September 24, 2020     Patient: Lieutenant Vieyra   YOB: 1975   Date of Visit: 9/24/2020       To Whom It May Concern: It is my medical opinion that Jesus Tiradon should not work tonight. If you have any questions or concerns, please don't hesitate to call.     Sincerely,        Candance Chol, MD

## 2020-09-24 NOTE — PROGRESS NOTES
against metal slab at [facility]- 1 wek ago and 2 days ago  Does have prior h/o DVT, not on [facility] currently, has edi filter. Associated signs and symptoms-   Reports 6/10 pain, worse on walking  no bleeding  Some foot swelling  Modifying factors-tried alleve, tylenol, motrin, no relief of pain    Hypothyroid  Lab Results   Component Value Date    TSH 12.43 (H) 04/23/2019   dose was increased last april    DIABETES MELLITUS:    diagnosed more than 5 years ago  Modifying factors on med:   Severity: controlled   Associated signs and symtoms: neuropathy/ckd/ CAD. aggravated with sugar diet and better with low sugar diet      - Follows a diabetic diet most of the time.   -Is compliant with medication(s) and is tolerating med(s) without any side effects.    -  Reports checking his/her glucose on a once a day schedule with sugars in the fasting 100-120  range. Hemoglobin A1C   Date Value Ref Range Status   09/24/2020 5.9 % Final   08/20/2019 5.5 % Final   04/23/2019 5.2 4.0 - 6.0 % Final     On metformin 1000mg  BID         HYPERLIPIDEMIA:   Patient reports doing well on current therapy of statin:  no, pt allergic to lipitor  - Denies side effects of muscle weakness or achiness. - Exercise  -last lipid panel  Lab Results   Component Value Date    CHOL 153 04/22/2018    CHOL 201 (H) 08/13/2014    CHOL 174 04/19/2012     Lab Results   Component Value Date    TRIG 292 (H) 04/22/2018    TRIG 268 (H) 08/13/2014    TRIG 471 (H) 04/19/2012     Lab Results   Component Value Date    HDL 20 (L) 04/22/2018    HDL 22 (L) 08/13/2014    HDL 29 (L) 04/19/2012     Lab Results   Component Value Date    LDLCHOLESTEROL 75 04/22/2018    LDLCHOLESTEROL 125 08/13/2014    LDLCHOLESTEROL Unable to calc.  as TRIG>400 04/19/2012     Lab Results   Component Value Date    VLDL NOT REPORTED 04/22/2018    VLDL NOT REPORTED 08/13/2014    VLDL NOT REPORTED 04/19/2012     Lab Results   Component Value Date    CHOLHDLRATIO 7.7 (H) 04/22/2018 Not on file     Non-medical: Not on file   Tobacco Use    Smoking status: Current Every Day Smoker     Packs/day: 0.25     Years: 12.00     Pack years: 3.00     Types: Cigarettes     Start date: 54    Smokeless tobacco: Never Used   Substance and Sexual Activity    Alcohol use: No    Drug use: No    Sexual activity: Yes     Partners: Male   Lifestyle    Physical activity     Days per week: Not on file     Minutes per session: Not on file    Stress: Not on file   Relationships    Social connections     Talks on phone: Not on file     Gets together: Not on file     Attends Moravian service: Not on file     Active member of club or organization: Not on file     Attends meetings of clubs or organizations: Not on file     Relationship status: Not on file    Intimate partner violence     Fear of current or ex partner: Not on file     Emotionally abused: Not on file     Physically abused: Not on file     Forced sexual activity: Not on file   Other Topics Concern    Not on file   Social History Narrative    Not on file           CURRENT MEDICATIONS:  Current Outpatient Medications   Medication Sig Dispense Refill    mometasone-formoterol (DULERA) 100-5 MCG/ACT inhaler Inhale 2 puffs into the lungs 2 times daily 1 Inhaler 3    levothyroxine (SYNTHROID) 125 MCG tablet Take 2 tablets by mouth Daily 180 tablet 1    Respiratory Therapy Supplies (NEBULIZER AIR TUBE/PLUGS) MISC 1 each by Does not apply route daily 1 each 0    albuterol (PROVENTIL) (2.5 MG/3ML) 0.083% nebulizer solution Take 3 mLs by nebulization every 4 hours as needed for Wheezing 300 mL 9    albuterol-ipratropium (COMBIVENT RESPIMAT)  MCG/ACT AERS inhaler Inhale 1 puff into the lungs every 6 hours 4 g 9    metFORMIN (GLUCOPHAGE) 1000 MG tablet Take 1 tablet by mouth 2 times daily (with meals) (Patient taking differently: Take 1,000 mg by mouth as needed ) 180 tablet 3     No current facility-administered medications for this visit. OBJECTIVE:  Vitals:    09/24/20 1606   BP: 130/74   Pulse: 91   Temp: 98 °F (36.7 °C)   SpO2: 97%     Body mass index is 34.39 kg/m². Focal exam:  Focal area of tenderness over the shin, patient has visible bruising in that area-from injury 1 week ago  Lower shin also tender from injury 2 days ago  Left ankle deformity from a prior ankle fracture  No calf swelling, no calf tenderness. General exam (except above):  General appearance - well appearing, alert, in no acute distress  Head - Atraumatic, normocephalic  Eyes - EOMI, no jaundice or pallor  Lungs - Lungs clear to auscultation. No wheezing, rhonchi, rales  Heart - RRR without murmur, gallop, or rubs. No ectopy  Abdomen - Abdomen soft, non-tender. Bowel sounds normal. No masses, organomegaly  Extremities -No significant edema, or skin discoloration. Good capillary refill. Neuro - Pt Alert, awake and oriented x 3. No gross focal neurological deficits    ASSESSMENT AND PLAN (MEDICAL DECISION MAKING):     Bernardo Coleman was seen today for leg swelling. Diagnoses and all orders for this visit:    Influenza vaccine needed  -     INFLUENZA, QUADV, 3 YRS AND OLDER, IM PF, PREFILL SYR OR SDV, 0.5ML (AFLURIA QUADV, PF)    Hypothyroidism, unspecified type  -     levothyroxine (SYNTHROID) 125 MCG tablet; Take 2 tablets by mouth Daily  -     TSH; Future    Type 2 diabetes mellitus without complication, without long-term current use of insulin (HCC)  -     POCT glycosylated hemoglobin (Hb A1C)  -      DIABETES FOOT EXAM  -     Microalbumin, Ur; Future    Encounter for screening mammogram for breast cancer  -     [facility] Digital Screen Bilateral [[id number]]; Future    Screening for hyperlipidemia  -     Lipid, Fasting;  Future    Chronic deep vein thrombosis (DVT) of both lower extremities, unspecified vein (HCC)    Uncontrolled type 2 diabetes mellitus with diabetic nephropathy, without long-term current use of insulin (HCC)  -     metFORMIN (GLUCOPHAGE) 1000 MG

## 2020-09-25 ENCOUNTER — HOSPITAL ENCOUNTER (OUTPATIENT)
Dept: GENERAL RADIOLOGY | Age: 45
Discharge: HOME | End: 2020-09-27
Payer: COMMERCIAL

## 2020-09-25 ENCOUNTER — HOSPITAL ENCOUNTER (OUTPATIENT)
Age: 45
Discharge: HOME | End: 2020-09-27
Payer: COMMERCIAL

## 2020-09-25 ENCOUNTER — HOSPITAL ENCOUNTER (OUTPATIENT)
Dept: VASCULAR LAB | Age: 45
Discharge: HOME | End: 2020-09-25
Payer: COMMERCIAL

## 2020-09-25 PROCEDURE — 93971 EXTREMITY STUDY: CPT

## 2020-09-25 PROCEDURE — 73610 X-RAY EXAM OF ANKLE: CPT

## 2020-09-25 PROCEDURE — 73590 X-RAY EXAM OF LOWER LEG: CPT

## 2020-09-28 ENCOUNTER — TELEPHONE (OUTPATIENT)
Dept: INTERNAL MEDICINE CLINIC | Age: 45
End: 2020-09-28

## 2020-10-01 ENCOUNTER — TELEPHONE (OUTPATIENT)
Dept: INTERNAL MEDICINE CLINIC | Age: 45
End: 2020-10-01

## 2020-10-01 ENCOUNTER — OFFICE VISIT (OUTPATIENT)
Dept: INTERNAL MEDICINE CLINIC | Age: 45
End: 2020-10-01
Payer: COMMERCIAL

## 2020-10-01 VITALS
DIASTOLIC BLOOD PRESSURE: 64 MMHG | WEIGHT: 191 LBS | OXYGEN SATURATION: 98 % | HEIGHT: 62 IN | SYSTOLIC BLOOD PRESSURE: 122 MMHG | TEMPERATURE: 98 F | BODY MASS INDEX: 35.15 KG/M2 | HEART RATE: 103 BPM

## 2020-10-01 PROCEDURE — 99213 OFFICE O/P EST LOW 20 MIN: CPT | Performed by: INTERNAL MEDICINE

## 2020-10-01 RX ORDER — OXYCODONE HYDROCHLORIDE AND ACETAMINOPHEN 5; 325 MG/1; MG/1
1 TABLET ORAL EVERY 6 HOURS PRN
Qty: 12 TABLET | Refills: 0 | Status: SHIPPED | OUTPATIENT
Start: 2020-10-01 | End: 2020-10-04

## 2020-10-01 NOTE — TELEPHONE ENCOUNTER
PT was seen in our Office 10/01/2020 referred to Dominique Bryant for Leg pain  She just called to get an appt  they cant get her in until 10/28/2020 she wants to know if there is someone else you would recommend she cant take the pain until then

## 2020-10-01 NOTE — PROGRESS NOTES
Visit Information    Have you changed or started any medications since your last visit including any over-the-counter medicines, vitamins, or herbal medicines? no   Are you having any side effects from any of your medications? -  no  Have you stopped taking any of your medications? Is so, why? -  no    Have you seen any other physician or provider since your last visit? No  Have you had any other diagnostic tests since your last visit? No  Have you been seen in the emergency room and/or had an admission to a hospital since we last saw you? No  Have you had your routine dental cleaning in the past 6 months? no    Have you activated your [facility] account? If not, what are your barriers?  Yes     Patient Care Team:  Gin Gavin MD as PCP - General (Internal Medicine)  Gin Gavin MD as PCP - [facility]    Medical History Review  Past Medical, Family, and Social History reviewed and does not contribute to the patient presenting condition    Health Maintenance   Topic Date Due    Diabetic retinal exam  07/01/1983    HIV screen  07/01/1988    Hepatitis B vaccine (1 of 3 - Risk 3-dose series) 07/01/1992    Cervical cancer screen  07/01/1994    Lipid screen  04/22/2019    Potassium monitoring  04/22/2019    Creatinine monitoring  04/22/2019    TSH testing  04/23/2020    Breast cancer screen  04/26/2020    Diabetic microalbuminuria test  08/20/2020    Diabetic foot exam  09/24/2021    A1C test (Diabetic or Prediabetic)  09/24/2021    DTaP/Tdap/Td vaccine (2 - Td) 06/03/2030    Flu vaccine  Completed    Pneumococcal 0-64 years Vaccine  Completed    Hepatitis A vaccine  Aged Out    Hib vaccine  Aged Out    Meningococcal (ACWY) vaccine  Aged Out     Chief Complaint   Patient presents with    Edema     leg is still painful swelling on and off      SUBJECTIVE:  Vincent Schrader is a 50 y.o. female patient who  comes for complaints of   Chief Complaint   Patient presents with    Edema     leg is still painful swelling on and off        Patient was seen 1 week ago for left leg pain status post trauma at work. She had a metallic tablet at work this has happened 2 or 3 times. DVT scan did not show any clots. X-rays do show possible metallic splinter  No evidence of infection. Will refer to surgery. REVIEW OF SYSTEMS (except Subjective (HPI))  GENERAL: No fevers / chills  RESPIRATORY: Negative for cough, wheezing or shortness of breath  CARDIOVASCULAR: Negative for chest pain or palpitations.   GI: no nausea, vomiting, or diarrhea  NEURO: No history of headaches    Past Medical History:   Diagnosis Date    Arthritis     OA    Asthma     Back pain, chronic 10/22/2014    Bright red rectal bleeding     CKD (chronic kidney disease) stage 1, GFR 90 ml/min or greater     Colon polyp 06/10/2019    SESSILE SERRATED ADENOMA    COPD (chronic obstructive pulmonary disease) (HCC)     CVA (cerebral infarction) 1997    Rt. side numbness & weakness    Depression     Diabetes mellitus ([address]     currently diet controlled    Diverticulosis     DVT (deep venous thrombosis) (HCC)     multiple- in Pregnancy    Family history of colon cancer     Full dentures     Upper only    GERD (gastroesophageal reflux disease)     HDL lipoprotein deficiency     Hiatal hernia     HTN (hypertension)     Hx of blood clots 2011    PE    Hypercalcemia     Hyperlipemia     Hyperparathyroidism ([address]     Hypomagnesemia     Hypophosphatemia     Leaky heart valve     Liver fatty degeneration     MDRO (multiple drug resistant organisms) resistance 1/2015    MRSA from spider bite    Pulmonary valve disease     Scoliosis     Sleep apnea     no machine    Thyroid disease     hypothryoid    Tobacco dependence     Varicose vein of leg        SOCIAL HISTORY:  Social History     Socioeconomic History    Marital status:      Spouse name: Marsha Valdes Number of children: 4    Years of education: Not on file   [place] Highest education level: Not on file   Occupational History    Not on file   Social Needs    Financial resource strain: Not on file    Food insecurity     Worry: Not on file     Inability: Not on file    Transportation needs     Medical: Not on file     Non-medical: Not on file   Tobacco Use    Smoking status: Current Every Day Smoker     Packs/day: 0.25     Years: 12.00     Pack years: 3.00     Types: Cigarettes     Start date: 1996    Smokeless tobacco: Never Used   Substance and Sexual Activity    Alcohol use: No    Drug use: No    Sexual activity: Yes     Partners: Male   Lifestyle    Physical activity     Days per week: Not on file     Minutes per session: Not on file    Stress: Not on file   Relationships    Social connections     Talks on phone: Not on file     Gets together: Not on file     Attends Taoism service: Not on file     Active member of club or organization: Not on file     Attends meetings of clubs or organizations: Not on file     Relationship status: Not on file    Intimate partner violence     Fear of current or ex partner: Not on file     Emotionally abused: Not on file     Physically abused: Not on file     Forced sexual activity: Not on file   Other Topics Concern    Not on file   Social History Narrative    Not on file           CURRENT MEDICATIONS:  Current Outpatient Medications   Medication Sig Dispense Refill    oxyCODONE-acetaminophen (PERCOCET) 5-325 MG per tablet Take 1 tablet by mouth every 6 hours as needed for Pain for up to 3 days. Intended supply: 3 days.  Take lowest dose possible to manage pain 12 tablet 0    levothyroxine (SYNTHROID) 125 MCG tablet Take 2 tablets by mouth Daily 180 tablet 1    metFORMIN (GLUCOPHAGE) 1000 MG tablet Take 1 tablet by mouth 2 times daily (with meals) 180 tablet 3    mometasone-formoterol (DULERA) 100-5 MCG/ACT inhaler Inhale 2 puffs into the lungs 2 times daily 1 Inhaler 3    Respiratory Therapy Supplies (NEBULIZER AIR TUBE/PLUGS) MISC 1 each by Does not apply route daily 1 each 0    albuterol (PROVENTIL) (2.5 MG/3ML) 0.083% nebulizer solution Take 3 mLs by nebulization every 4 hours as needed for Wheezing 300 mL 9    albuterol-ipratropium (COMBIVENT RESPIMAT)  MCG/ACT AERS inhaler Inhale 1 puff into the lungs every 6 hours 4 g 9     No current facility-administered medications for this visit. OBJECTIVE:  Vitals:    10/01/20 1520   BP: 122/64   Pulse: 103   Temp: 98 °F (36.7 °C)   SpO2: 98%     Body mass index is 34.93 kg/m². Focal exam:  No change in leg examination from prior, no redness/warmth, continues to have tenderness to touch on anterioro- lateral shin on the left  General exam (except above):  General appearance - well appearing, alert, in no acute distress  Head - Atraumatic, normocephalic  Eyes - EOMI, no jaundice or pallor  Extremities -No significant edema, or skin discoloration. Good capillary refill. Neuro - Pt Alert, awake and oriented x 3. No gross focal neurological deficits    ASSESSMENT AND PLAN (MEDICAL DECISION MAKING):   Lee Ann Shah was seen today for edema. Diagnoses and all orders for this visit:    Left leg pain  -     AFL - Enedina Auguste MD, General Surgery, [place]  -     oxyCODONE-acetaminophen (PERCOCET) 5-325 MG per tablet; Take 1 tablet by mouth every 6 hours as needed for Pain for up to 3 days. Intended supply: 3 days.  Take lowest dose possible to manage pain           Follow up in: CHIKI Rose MD

## 2020-10-02 NOTE — TELEPHONE ENCOUNTER
She can go to Dr Larry Douglas. I have informed the patient she will get a call from our office on Monday.      Miguel Haywood

## 2020-10-06 ENCOUNTER — TELEPHONE (OUTPATIENT)
Dept: INTERNAL MEDICINE CLINIC | Age: 45
End: 2020-10-06

## 2020-10-06 NOTE — TELEPHONE ENCOUNTER
You referred to Dr Paola Burnette for leg pain. They do not understand the referral.  She is general surgery. Was this an error?

## 2020-10-07 NOTE — TELEPHONE ENCOUNTER
Per Dr Freddy Quan, there is a foreign body in the soft tissue in ankle.   I notified Dr Estephania Hutchinson office, Damien Ocampo, and they will contact the patient right away

## 2020-12-10 ENCOUNTER — VIRTUAL VISIT (OUTPATIENT)
Dept: INTERNAL MEDICINE CLINIC | Age: 45
End: 2020-12-10
Payer: COMMERCIAL

## 2020-12-10 ENCOUNTER — TELEPHONE (OUTPATIENT)
Dept: INTERNAL MEDICINE CLINIC | Age: 45
End: 2020-12-10

## 2020-12-10 ENCOUNTER — NURSE ONLY (OUTPATIENT)
Dept: PRIMARY CARE CLINIC | Age: 45
End: 2020-12-10

## 2020-12-10 ENCOUNTER — HOSPITAL ENCOUNTER (OUTPATIENT)
Age: 45
Setting detail: SPECIMEN
Discharge: HOME | End: 2020-12-10
Payer: COMMERCIAL

## 2020-12-10 PROBLEM — R05.8 COUGH WITH EXPOSURE TO COVID-19 VIRUS: Status: ACTIVE | Noted: 2020-12-10

## 2020-12-10 PROBLEM — Z20.822 COUGH WITH EXPOSURE TO COVID-19 VIRUS: Status: ACTIVE | Noted: 2020-12-10

## 2020-12-10 PROCEDURE — 99443 PR PHYS/QHP TELEPHONE EVALUATION 21-30 MIN: CPT | Performed by: INTERNAL MEDICINE

## 2020-12-10 RX ORDER — LEVOFLOXACIN 500 MG/1
500 TABLET, FILM COATED ORAL DAILY
Qty: 10 TABLET | Refills: 0 | Status: SHIPPED | OUTPATIENT
Start: 2020-12-10 | End: 2020-12-20

## 2020-12-10 RX ORDER — ALBUTEROL SULFATE 2.5 MG/3ML
2.5 SOLUTION RESPIRATORY (INHALATION) EVERY 4 HOURS PRN
Qty: 300 ML | Refills: 9 | Status: SHIPPED | OUTPATIENT
Start: 2020-12-10

## 2020-12-10 NOTE — PROGRESS NOTES
Question:   Previously tested for COVID-19? Answer:   No     Orders Placed This Encounter   Medications    albuterol (PROVENTIL) (2.5 MG/3ML) 0.083% nebulizer solution     Sig: Take 3 mLs by nebulization every 4 hours as needed for Wheezing     Dispense:  300 mL     Refill:  9    albuterol-ipratropium (COMBIVENT RESPIMAT)  MCG/ACT AERS inhaler     Sig: Inhale 1 puff into the lungs every 6 hours     Dispense:  4 g     Refill:  9    levoFLOXacin (LEVAQUIN) 500 MG tablet     Sig: Take 1 tablet by mouth daily for 10 days     Dispense:  10 tablet     Refill:  0   Time spent to review her records, telephone conversation and complete this documentation was 22 minutes      Return in about 12 weeks (around 3/4/2021) for Follow Up, COPD. Orders Placed This Encounter   Procedures    COVID-19 Ambulatory     Scheduling Instructions:      Saline media preferred given current shortage of viral transport media but both acceptable     Order Specific Question:   Is this test for diagnosis or screening? Answer:   Diagnosis of ill patient     Order Specific Question:   Symptomatic for COVID-19 as defined by CDC? Answer:   Yes     Order Specific Question:   Date of Symptom Onset     Answer:   12/3/2020     Order Specific Question:   Hospitalized for COVID-19? Answer:   No     Order Specific Question:   Admitted to ICU for COVID-19? Answer:   No     Order Specific Question:   Employed in healthcare setting? Answer:   No     Order Specific Question:   Resident in a congregate (group) care setting? Answer:   No     Order Specific Question:   Pregnant? Answer:   No     Order Specific Question:   Previously tested for COVID-19?      Answer:   No     Orders Placed This Encounter   Medications    albuterol (PROVENTIL) (2.5 MG/3ML) 0.083% nebulizer solution     Sig: Take 3 mLs by nebulization every 4 hours as needed for Wheezing     Dispense:  300 mL     Refill:  9    albuterol-ipratropium (COMBIVENT RESPIMAT)  MCG/ACT AERS inhaler     Sig: Inhale 1 puff into the lungs every 6 hours     Dispense:  4 g     Refill:  9    levoFLOXacin (LEVAQUIN) 500 MG tablet     Sig: Take 1 tablet by mouth daily for 10 days     Dispense:  10 tablet     Refill:  0     Visit Information    Have you changed or started any medications since your last visit including any over-the-counter medicines, vitamins, or herbal medicines? no   Are you having any side effects from any of your medications? -  no  Have you stopped taking any of your medications? Is so, why? -  no    Have you seen any other physician or provider since your last visit? No  Have you had any other diagnostic tests since your last visit? No  Have you been seen in the emergency room and/or had an admission to a hospital since we last saw you? No  Have you had your routine dental cleaning in the past 6 months? no    Have you activated your [employer] account? If not, what are your barriers?  Yes     Patient Care Team:  Anup Mcguire MD as PCP - General (Internal Medicine)  Anup Mcguire MD as PCP - [facility] Provider  Nola Kay MD as Surgeon (General Surgery)    Medical History Review  Past Medical, Family, and Social History reviewed and does not contribute to the patient presenting condition    Health Maintenance   Topic Date Due    Diabetic retinal exam  07/01/1983    HIV screen  07/01/1988    Hepatitis B vaccine (1 of 3 - Risk 3-dose series) 07/01/1992    Cervical cancer screen  07/01/1994    Lipid screen  04/22/2019    Potassium monitoring  04/22/2019    Creatinine monitoring  04/22/2019    TSH testing  04/23/2020    Breast cancer screen  04/26/2020    Diabetic microalbuminuria test  08/20/2020    Diabetic foot exam  09/24/2021    A1C test (Diabetic or Prediabetic)  09/24/2021    DTaP/Tdap/Td vaccine (2 - Td) 06/03/2030    Flu vaccine  Completed    Pneumococcal 0-64 years Vaccine  Completed    Hepatitis A vaccine  Aged Out    Hib vaccine Aged Out    Meningococcal (ACWY) vaccine  Aged Out

## 2020-12-12 LAB — SARS-COV-2, NAA: NOT DETECTED

## 2020-12-14 ENCOUNTER — TELEPHONE (OUTPATIENT)
Dept: INTERNAL MEDICINE CLINIC | Age: 45
End: 2020-12-14

## 2020-12-14 NOTE — LETTER
[facility]  [address],[facility] [address] [place] [address]  Phone: [phone]  Fax: [phone]    Pema Torre MD        December 14, 2020     Patient: Becka Burgos   YOB: 1975   Date of Visit: 12/14/2020       To Whom It May Concern: It is my medical opinion that Venkata Nava may return to work on 12/15/2020. COVID-19 testing was negative. If you have any questions or concerns, please don't hesitate to call.     Sincerely,        Pema Torre MD

## 2020-12-14 NOTE — TELEPHONE ENCOUNTER
Pt is requesting that our office send a work note attn Ana Coronado that she her covid test results were negative & it is okay for pt to return to work. Please advise if okay to issue work note.

## 2021-07-06 DIAGNOSIS — J41.0 SIMPLE CHRONIC BRONCHITIS (HCC): ICD-10-CM

## 2021-08-12 ENCOUNTER — TELEPHONE (OUTPATIENT)
Dept: FAMILY MEDICINE CLINIC | Age: 46
End: 2021-08-12

## 2021-08-12 NOTE — TELEPHONE ENCOUNTER
Patito Gupta was contacted as part of mammography outreach. Mammography appointment has been scheduled.     Argentina Whitten

## 2021-10-07 ENCOUNTER — HOSPITAL ENCOUNTER (OUTPATIENT)
Dept: GENERAL RADIOLOGY | Facility: CLINIC | Age: 46
Discharge: HOME | End: 2021-10-09
Payer: COMMERCIAL

## 2021-10-07 ENCOUNTER — NURSE TRIAGE (OUTPATIENT)
Dept: OTHER | Facility: CLINIC | Age: 46
End: 2021-10-07

## 2021-10-07 ENCOUNTER — HOSPITAL ENCOUNTER (OUTPATIENT)
Facility: CLINIC | Age: 46
Discharge: HOME | End: 2021-10-09
Payer: COMMERCIAL

## 2021-10-07 ENCOUNTER — OFFICE VISIT (OUTPATIENT)
Dept: INTERNAL MEDICINE CLINIC | Age: 46
End: 2021-10-07
Payer: COMMERCIAL

## 2021-10-07 VITALS
DIASTOLIC BLOOD PRESSURE: 80 MMHG | SYSTOLIC BLOOD PRESSURE: 124 MMHG | OXYGEN SATURATION: 97 % | HEIGHT: 62 IN | BODY MASS INDEX: 35.92 KG/M2 | HEART RATE: 92 BPM | WEIGHT: 195.2 LBS

## 2021-10-07 DIAGNOSIS — E78.00 PURE HYPERCHOLESTEROLEMIA: ICD-10-CM

## 2021-10-07 DIAGNOSIS — M54.42 CHRONIC MIDLINE LOW BACK PAIN WITH BILATERAL SCIATICA: ICD-10-CM

## 2021-10-07 DIAGNOSIS — G89.29 CHRONIC MIDLINE LOW BACK PAIN WITH BILATERAL SCIATICA: ICD-10-CM

## 2021-10-07 DIAGNOSIS — M54.41 CHRONIC MIDLINE LOW BACK PAIN WITH BILATERAL SCIATICA: ICD-10-CM

## 2021-10-07 DIAGNOSIS — E11.9 TYPE 2 DIABETES MELLITUS WITHOUT COMPLICATION, WITHOUT LONG-TERM CURRENT USE OF INSULIN (HCC): ICD-10-CM

## 2021-10-07 DIAGNOSIS — Z13.29 SCREENING FOR THYROID DISORDER: ICD-10-CM

## 2021-10-07 DIAGNOSIS — M25.551 RIGHT HIP PAIN: ICD-10-CM

## 2021-10-07 DIAGNOSIS — J44.1 COPD EXACERBATION (HCC): Primary | ICD-10-CM

## 2021-10-07 LAB — HBA1C MFR BLD: 5.7 %

## 2021-10-07 PROCEDURE — 99214 OFFICE O/P EST MOD 30 MIN: CPT | Performed by: NURSE PRACTITIONER

## 2021-10-07 PROCEDURE — 73502 X-RAY EXAM HIP UNI 2-3 VIEWS: CPT

## 2021-10-07 PROCEDURE — 83036 HEMOGLOBIN GLYCOSYLATED A1C: CPT | Performed by: NURSE PRACTITIONER

## 2021-10-07 PROCEDURE — 72100 X-RAY EXAM L-S SPINE 2/3 VWS: CPT

## 2021-10-07 RX ORDER — ALBUTEROL SULFATE 90 UG/1
2 AEROSOL, METERED RESPIRATORY (INHALATION) 4 TIMES DAILY PRN
Qty: 54 G | Refills: 1 | Status: SHIPPED | OUTPATIENT
Start: 2021-10-07

## 2021-10-07 RX ORDER — PREDNISONE 20 MG/1
20 TABLET ORAL DAILY
Qty: 7 TABLET | Refills: 0 | Status: SHIPPED | OUTPATIENT
Start: 2021-10-07 | End: 2021-10-14

## 2021-10-07 RX ORDER — TIOTROPIUM BROMIDE 18 UG/1
18 CAPSULE ORAL; RESPIRATORY (INHALATION) DAILY
Qty: 90 CAPSULE | Refills: 1 | Status: SHIPPED | OUTPATIENT
Start: 2021-10-07

## 2021-10-07 RX ORDER — AZITHROMYCIN 250 MG/1
250 TABLET, FILM COATED ORAL SEE ADMIN INSTRUCTIONS
Qty: 6 TABLET | Refills: 0 | Status: SHIPPED | OUTPATIENT
Start: 2021-10-07 | End: 2021-10-12

## 2021-10-07 SDOH — ECONOMIC STABILITY: FOOD INSECURITY: WITHIN THE PAST 12 MONTHS, THE FOOD YOU BOUGHT JUST DIDN'T LAST AND YOU DIDN'T HAVE MONEY TO GET MORE.: NEVER TRUE

## 2021-10-07 SDOH — ECONOMIC STABILITY: FOOD INSECURITY: WITHIN THE PAST 12 MONTHS, YOU WORRIED THAT YOUR FOOD WOULD RUN OUT BEFORE YOU GOT MONEY TO BUY MORE.: NEVER TRUE

## 2021-10-07 ASSESSMENT — SOCIAL DETERMINANTS OF HEALTH (SDOH): HOW HARD IS IT FOR YOU TO PAY FOR THE VERY BASICS LIKE FOOD, HOUSING, MEDICAL CARE, AND HEATING?: NOT HARD AT ALL

## 2021-10-07 NOTE — PROGRESS NOTES
Visit Information    Have you changed or started any medications since your last visit including any over-the-counter medicines, vitamins, or herbal medicines? no   Are you having any side effects from any of your medications? -  no  Have you stopped taking any of your medications? Is so, why? -  no    Have you seen any other physician or provider since your last visit? No  Have you had any other diagnostic tests since your last visit? No  Have you been seen in the emergency room and/or had an admission to a hospital since we last saw you? No  Have you had your routine dental cleaning in the past 6 months? no    Have you activated your [employer] account? If not, what are your barriers?  Yes     Patient Care Team:  Rodolfo Maurer MD as PCP - General (Internal Medicine)  Rodolfo Maurer MD as PCP - [facility] Provider  Miroslava Ingram MD as Surgeon (General Surgery)    Medical History Review  Past Medical, Family, and Social History reviewed and does contribute to the patient presenting condition    Health Maintenance   Topic Date Due    Hepatitis C screen  Never done    Diabetic retinal exam  Never done    COVID-19 Vaccine (1) Never done    HIV screen  Never done    Hepatitis B vaccine (1 of 3 - Risk 3-dose series) Never done    Lipid screen  04/22/2019    Potassium monitoring  04/22/2019    Creatinine monitoring  04/22/2019    TSH testing  04/23/2020    Breast cancer screen  04/26/2020    Diabetic microalbuminuria test  08/20/2020    Flu vaccine (1) 09/01/2021    Diabetic foot exam  09/24/2021    A1C test (Diabetic or Prediabetic)  10/07/2022    Colon cancer screen colonoscopy  06/10/2029    DTaP/Tdap/Td vaccine (2 - Td or Tdap) 06/03/2030    Pneumococcal 0-64 years Vaccine (2 of 2 - PPSV23) 07/01/2038    Hepatitis A vaccine  Aged Out    Hib vaccine  Aged Out    Meningococcal (ACWY) vaccine  Aged Out         [address] [address]  Dept: [phone]  Dept Fax: [phone]    OfficeProgress/Follow Up Note  Date of patient's visit: 10/19/2021  Patient's Name:  Khoi Sandhu YOB: 1975            Patient Care Team:  Reyna Ruiz MD as PCP - General (Internal Medicine)  Reyna Ruiz MD as PCP - [facility]  Shana Schneider MD as Surgeon (General Surgery)    REASON FOR VISIT:Same day visit    HISTORY OF PRESENT ILLNESS:      History was obtained from the patient. Khoi Sandhu is a 48 y.o. female here today for Khoi Sandhu is a 48 y.o. female here today for COPD, Hip Pain (right hip), and Diabetes (a1c today 5.7)      COPD exacerbation  Started 3 days ago  Rhinorrhea, hx of sinus infections,chest congestion, productive cough thick yellow sputum, post tussive emesis and intermittent SOB  Using albuterol 3-4 times per day. Denies fevers or chills    R hip pain  Started 3 months ago  Pain is aggravated with sitting, walking standing, laying on it  Soaking in hot tub helps with pain  Radiates down leg  Denies loss of bowel/bladder function or saddle anesthesia  Denies h/o injury or trauma to hip    Diabetes   Duration several years  Modifying factors on Metformin. Severity controlled  Symptoms exacerbated by high carbohydrate diet  Symptoms alleviated by healthy low carbohydrate diet and medication compliance  Denies hypoglycemia, visual disturbance, headaches, fatigue, or lightheadedness.    Patient Active Problem List   Diagnosis    DVT (deep venous thrombosis) (HCC)    Cerebral infarction ([address]    COPD (chronic obstructive pulmonary disease) ([address]    Pulmonary valve disease    Scoliosis    Major depressive disorder, recurrent episode, moderate (HCC)    Hyperlipemia    Liver fatty degeneration    Essential hypertension    HDL lipoprotein deficiency    Varicose vein of leg    COPD exacerbation (HCC)    Abdominal pain    Back pain, chronic    Diarrhea    Rectal bleeding    Chondromalacia of patella    Pre-diabetes    Prediabetes    [facility]coagulable state [facility]    Intractable migraine without aura and without status migrainosus    Simple chronic bronchitis (HCC)    Tobacco dependence    Hypothyroidism    Closed nondisplaced fracture of lateral malleolus of left fibula    Hypercalcemia    Hyperparathyroidism ([facility]    CKD (chronic kidney disease) stage 1, GFR 90 ml/min or greater    Hypophosphatemia    Hypomagnesemia    Type 2 diabetes mellitus without complication, without long-term current use of insulin ([facility]    Morbidly obese ([facility]    Family history of colon cancer in mother    Family history of colon cancer in father    History of colon polyps    Colon polyp    Cough with exposure to COVID-19 virus       MEDICATIONS:      Current Outpatient Medications   Medication Sig Dispense Refill    tiotropium (SPIRIVA HANDIHALER) 18 MCG inhalation capsule Inhale 1 capsule into the lungs daily 90 capsule 1    albuterol sulfate HFA (VENTOLIN HFA) 108 (90 Base) MCG/ACT inhaler Inhale 2 puffs into the lungs 4 times daily as needed for Wheezing 54 g 1    albuterol (PROVENTIL) (2.5 MG/3ML) 0.083% nebulizer solution Take 3 mLs by nebulization every 4 hours as needed for Wheezing 300 mL 9    levothyroxine (SYNTHROID) 125 MCG tablet Take 2 tablets by mouth Daily 180 tablet 1    Respiratory Therapy Supplies (NEBULIZER AIR TUBE/PLUGS) MISC 1 each by Does not apply route daily 1 each 0    aspirin 325 MG EC tablet Take 81 mg by mouth (Patient not taking: Reported on 10/7/2021)      albuterol-ipratropium (COMBIVENT RESPIMAT)  MCG/ACT AERS inhaler Inhale 1 puff into the lungs every 6 hours (Patient not taking: Reported on 10/7/2021) 4 g 9    metFORMIN (GLUCOPHAGE) 1000 MG tablet Take 1 tablet by mouth 2 times daily (with meals) 180 tablet 3    mometasone-formoterol (DULERA) 100-5 MCG/ACT inhaler Inhale 2 puffs into the lungs 2 times daily (Patient not taking: Reported on 10/7/2021) 1 Inhaler Patient given educational materials - see patient instructions    RAFA Morris - CNP   [facility]  10/19/2021, 10:11 AM    Please note that this chart wasgenerated using voice recognition Dragon dictation software. Although every effort was made to ensure the accuracy of this automated transcription, some errors in transcription may have occurred.

## 2021-10-07 NOTE — PROGRESS NOTES
Are you sick today? no    Are you allergic to eggs? no    Have you ever had a reaction to the flu vaccine? no    Have you ever had Lovette Chamber- barre's Syndrome? no     Per order from provider VIS given to patient, consent received,  flu vaccine was administered and documented in vaccine part of chart patient tolerated well.

## 2021-10-08 ENCOUNTER — TELEPHONE (OUTPATIENT)
Dept: INTERNAL MEDICINE CLINIC | Age: 46
End: 2021-10-08

## 2021-10-12 NOTE — TELEPHONE ENCOUNTER
Patient called again and is anxious on getting test results , stating that she wants to know why she is in pain.

## 2021-10-13 DIAGNOSIS — M54.50 ACUTE MIDLINE LOW BACK PAIN, UNSPECIFIED WHETHER SCIATICA PRESENT: Primary | ICD-10-CM

## 2021-10-13 RX ORDER — OXYCODONE HYDROCHLORIDE AND ACETAMINOPHEN 5; 325 MG/1; MG/1
1 TABLET ORAL EVERY 6 HOURS PRN
Qty: 20 TABLET | Refills: 0 | Status: SHIPPED | OUTPATIENT
Start: 2021-10-13 | End: 2021-10-18

## 2021-10-15 ENCOUNTER — TELEPHONE (OUTPATIENT)
Dept: INTERNAL MEDICINE CLINIC | Age: 46
End: 2021-10-15

## 2021-10-15 NOTE — TELEPHONE ENCOUNTER
Patient finished antibiotic Z-Pack  two days ago and still has a constant cough that has subsided , please advise

## 2021-10-15 NOTE — TELEPHONE ENCOUNTER
Patient was provided with Azpack and 7 day course of prednisone.  I can extend the course of oral steroid if she is having SOB/LARKIN

## 2021-10-19 ASSESSMENT — ENCOUNTER SYMPTOMS
COUGH: 1
DIARRHEA: 0
COLOR CHANGE: 0
WHEEZING: 0
NAUSEA: 0
VOMITING: 0
CHEST TIGHTNESS: 1
RHINORRHEA: 1
SHORTNESS OF BREATH: 1
CONSTIPATION: 0
SORE THROAT: 0
TROUBLE SWALLOWING: 0

## 2021-10-21 ENCOUNTER — HOSPITAL ENCOUNTER (OUTPATIENT)
Age: 46
Discharge: HOME | End: 2021-10-21
Payer: COMMERCIAL

## 2021-10-21 ENCOUNTER — HOSPITAL ENCOUNTER (OUTPATIENT)
Dept: GENERAL RADIOLOGY | Age: 46
Discharge: HOME | End: 2021-10-23
Payer: COMMERCIAL

## 2021-10-21 ENCOUNTER — OFFICE VISIT (OUTPATIENT)
Dept: INTERNAL MEDICINE CLINIC | Age: 46
End: 2021-10-21
Payer: COMMERCIAL

## 2021-10-21 ENCOUNTER — HOSPITAL ENCOUNTER (OUTPATIENT)
Age: 46
Discharge: HOME | End: 2021-10-23
Payer: COMMERCIAL

## 2021-10-21 VITALS
BODY MASS INDEX: 35.88 KG/M2 | HEART RATE: 88 BPM | SYSTOLIC BLOOD PRESSURE: 112 MMHG | OXYGEN SATURATION: 98 % | WEIGHT: 195 LBS | HEIGHT: 62 IN | DIASTOLIC BLOOD PRESSURE: 82 MMHG

## 2021-10-21 DIAGNOSIS — Z12.31 ENCOUNTER FOR SCREENING MAMMOGRAM FOR BREAST CANCER: ICD-10-CM

## 2021-10-21 DIAGNOSIS — J44.1 COPD EXACERBATION (HCC): ICD-10-CM

## 2021-10-21 DIAGNOSIS — E11.9 TYPE 2 DIABETES MELLITUS WITHOUT COMPLICATION, WITHOUT LONG-TERM CURRENT USE OF INSULIN (HCC): Primary | ICD-10-CM

## 2021-10-21 DIAGNOSIS — E11.9 TYPE 2 DIABETES MELLITUS WITHOUT COMPLICATION, WITHOUT LONG-TERM CURRENT USE OF INSULIN (HCC): ICD-10-CM

## 2021-10-21 LAB
-: NORMAL
REASON FOR REJECTION: NORMAL
ZZ NTE CLEAN UP: ORDERED TEST: NORMAL
ZZ NTE WITH NAME CLEAN UP: SPECIMEN SOURCE: NORMAL

## 2021-10-21 PROCEDURE — 99213 OFFICE O/P EST LOW 20 MIN: CPT | Performed by: NURSE PRACTITIONER

## 2021-10-21 PROCEDURE — 71045 X-RAY EXAM CHEST 1 VIEW: CPT

## 2021-10-21 RX ORDER — IPRATROPIUM BROMIDE AND ALBUTEROL SULFATE 2.5; .5 MG/3ML; MG/3ML
1 SOLUTION RESPIRATORY (INHALATION) EVERY 4 HOURS PRN
Qty: 360 ML | Refills: 3 | Status: SHIPPED | OUTPATIENT
Start: 2021-10-21

## 2021-10-21 RX ORDER — PREDNISONE 20 MG/1
TABLET ORAL
Qty: 20 TABLET | Refills: 0 | Status: SHIPPED | OUTPATIENT
Start: 2021-10-21 | End: 2021-11-05

## 2021-10-21 RX ORDER — GUAIFENESIN 600 MG/1
600 TABLET, EXTENDED RELEASE ORAL 2 TIMES DAILY
Qty: 30 TABLET | Refills: 0 | Status: SHIPPED | OUTPATIENT
Start: 2021-10-21 | End: 2021-11-05

## 2021-10-21 ASSESSMENT — ENCOUNTER SYMPTOMS
VOMITING: 0
SHORTNESS OF BREATH: 1
SORE THROAT: 0
COLOR CHANGE: 0
CONSTIPATION: 0
WHEEZING: 0
CHEST TIGHTNESS: 1
RHINORRHEA: 0
TROUBLE SWALLOWING: 0
DIARRHEA: 0
NAUSEA: 0
COUGH: 1

## 2021-10-21 NOTE — PROGRESS NOTES
Visit Information    Have you changed or started any medications since your last visit including any over-the-counter medicines, vitamins, or herbal medicines? no   Are you having any side effects from any of your medications? -  no  Have you stopped taking any of your medications? Is so, why? -  no    Have you seen any other physician or provider since your last visit? No  Have you had any other diagnostic tests since your last visit? No  Have you been seen in the emergency room and/or had an admission to a hospital since we last saw you? No  Have you had your routine dental cleaning in the past 6 months? yes -     Have you activated your [employer] account? If not, what are your barriers?  Yes     Patient Care Team:  Reyna Ruiz MD as PCP - General (Internal Medicine)  Reyna Ruiz MD as PCP - [facility]  Shana Schneider MD as Surgeon (General Surgery)    Medical History Review  Past Medical, Family, and Social History reviewed and does contribute to the patient presenting condition    Health Maintenance   Topic Date Due    Hepatitis C screen  Never done    Diabetic retinal exam  Never done    COVID-19 Vaccine (1) Never done    HIV screen  Never done    Hepatitis B vaccine (1 of 3 - Risk 3-dose series) Never done    Lipid screen  04/22/2019    Potassium monitoring  04/22/2019    Creatinine monitoring  04/22/2019    TSH testing  04/23/2020    Breast cancer screen  04/26/2020    Diabetic microalbuminuria test  08/20/2020    Flu vaccine (1) 09/01/2021    Diabetic foot exam  09/24/2021    A1C test (Diabetic or Prediabetic)  10/07/2022    Colon cancer screen colonoscopy  06/10/2029    DTaP/Tdap/Td vaccine (2 - Td or Tdap) 06/03/2030    Pneumococcal 0-64 years Vaccine (2 of 2 - PPSV23) 07/01/2038    Hepatitis A vaccine  Aged Out    Hib vaccine  Aged Out    Meningococcal (ACWY) vaccine  Aged Out         [address] [facility]. [address] [place] [address]  Dept: [phone]  Dept Fax: [phone]    OfficeProgress/Follow Up Note  Date of patient's visit: 10/21/2021  Patient's Name:  Constance Stevens YOB: 1975            Patient Care Team:  Alicia Olivera MD as PCP - General (Internal Medicine)  Alicia Olivera MD as PCP - [facility]  Shreya Callahan MD as Surgeon (General Surgery)    REASON FOR VISIT:  Routine outpatient follow up    HISTORY OF PRESENT ILLNESS:        History was obtained from the patient.      Constance Stevens is a 48 y.o. female here today for COPD (overall doing well, still having trouble breathing while having \"cough attacks' has then 4-5 times per hour)    COPD exacerbation follow up  Chest congestion  Productive cough (green mucus)  Endorses using albuterol nebulizer q4h  Denies improvement following oral steroids and Zpack  Denies URI symptoms, and is compliant with Spiriva    Patient Active Problem List   Diagnosis    DVT (deep venous thrombosis) (HCC)    Cerebral infarction ([address]    COPD (chronic obstructive pulmonary disease) ([address]    Pulmonary valve disease    Scoliosis    Major depressive disorder, recurrent episode, moderate ([address]    Hyperlipemia    Liver fatty degeneration    Essential hypertension    HDL lipoprotein deficiency    Varicose vein of leg    COPD exacerbation (HCC)    Abdominal pain    Back pain, chronic    Diarrhea    Rectal bleeding    Chondromalacia of patella    Pre-diabetes    Prediabetes    Hypercoagulable state ([address]    Intractable migraine without aura and without status migrainosus    Simple chronic bronchitis (HCC)    Tobacco dependence    Hypothyroidism    Closed nondisplaced fracture of lateral malleolus of left fibula    Hypercalcemia    Hyperparathyroidism (HCC)    CKD (chronic kidney disease) stage 1, GFR 90 ml/min or greater    Hypophosphatemia    Hypomagnesemia    Type 2 diabetes mellitus without complication, without long-term current use of insulin ([address]    Morbidly obese ([facility]    Family history of colon cancer in mother   Theodoro Milder Family history of colon cancer in father    History of colon polyps    Colon polyp    Cough with exposure to COVID-19 virus       Health Maintenance Due   Topic Date Due    Hepatitis C screen  Never done    Diabetic retinal exam  Never done    COVID-19 Vaccine (1) Never done    HIV screen  Never done    Hepatitis B vaccine (1 of 3 - Risk 3-dose series) Never done    Lipid screen  04/22/2019    Potassium monitoring  04/22/2019    Creatinine monitoring  04/22/2019    TSH testing  04/23/2020    Breast cancer screen  04/26/2020    Diabetic microalbuminuria test  08/20/2020    Flu vaccine (1) 09/01/2021    Diabetic foot exam  09/24/2021       MEDICATIONS:      Current Outpatient Medications   Medication Sig Dispense Refill    predniSONE (DELTASONE) 20 MG tablet Take 2 tablets by mouth daily for 5 days, THEN 1 tablet daily for 5 days, THEN 0.5 tablets daily for 5 days.  20 tablet 0    ipratropium-albuterol (DUONEB) 0.5-2.5 (3) MG/3ML SOLN nebulizer solution Inhale 3 mLs into the lungs every 4 hours as needed for Shortness of Breath 360 mL 3    guaiFENesin (MUCINEX) 600 MG extended release tablet Take 1 tablet by mouth 2 times daily for 15 days 30 tablet 0    tiotropium (SPIRIVA HANDIHALER) 18 MCG inhalation capsule Inhale 1 capsule into the lungs daily 90 capsule 1    albuterol sulfate HFA (VENTOLIN HFA) 108 (90 Base) MCG/ACT inhaler Inhale 2 puffs into the lungs 4 times daily as needed for Wheezing 54 g 1    albuterol (PROVENTIL) (2.5 MG/3ML) 0.083% nebulizer solution Take 3 mLs by nebulization every 4 hours as needed for Wheezing 300 mL 9    levothyroxine (SYNTHROID) 125 MCG tablet Take 2 tablets by mouth Daily 180 tablet 1    Respiratory Therapy Supplies (NEBULIZER AIR TUBE/PLUGS) MISC 1 each by Does not apply route daily 1 each 0    aspirin 325 MG EC tablet Take 81 mg by mouth (Patient not taking: Reported on 10/7/2021)      metFORMIN (GLUCOPHAGE) 1000 MG tablet Take 1 tablet by mouth 2 times daily (with meals) 180 tablet 3     No current facility-administered medications for this visit. ALLERGIES:      Allergies   Allergen Reactions    Atorvastatin Hives    Cymbalta [Duloxetine Hcl] Hives    Lipitor Hives    Tape [Adhesive Tape] Hives and Other (See Comments)     blister         SOCIAL HISTORY    Reviewed and no change from previous record. Jose Raul Prieto  reports that she has been smoking cigarettes. She started smoking about 25 years ago. She has a 3.00 pack-year smoking history. She has never used smokeless tobacco.    FAMILY HISTORY:    Reviewed and No change from previous visit    REVIEW OF SYSTEMS:    Review of Systems   Constitutional: Negative for chills, diaphoresis, fatigue, fever and unexpected weight change. HENT: Negative for congestion, postnasal drip, rhinorrhea, sneezing, sore throat and trouble swallowing. Eyes: Negative for visual disturbance. Respiratory: Positive for cough, chest tightness and shortness of breath. Negative for wheezing. Cardiovascular: Negative for chest pain. Gastrointestinal: Negative for constipation, diarrhea, nausea and vomiting. Endocrine: Negative for polydipsia, polyphagia and polyuria. Genitourinary: Negative for difficulty urinating, dysuria, flank pain, frequency and urgency. Musculoskeletal: Negative for arthralgias. Skin: Negative for color change and rash. Neurological: Negative for dizziness, tremors, syncope, weakness and numbness. Psychiatric/Behavioral: Negative for confusion and hallucinations.        PHYSICAL EXAM:      Vitals:    10/21/21 1442   BP: 112/82   Pulse: 88   SpO2: 98%   Weight: 195 lb (88.5 kg)   Height: 5' 2\" (1.575 m)     BP Readings from Last 3 Encounters:   10/21/21 112/82   10/07/21 124/80   10/01/20 122/64      Wt Readings from Last 3 Encounters:   10/21/21 195 lb (88.5 kg)   10/07/21 195 lb 3.2 oz (88.5 kg)   10/01/20 191 lb (86.6 kg)       Physical Exam  Vitals reviewed. Constitutional:       Appearance: Normal appearance. HENT:      Head: Normocephalic. Cardiovascular:      Rate and Rhythm: Normal rate and regular rhythm. Pulses: Normal pulses. Heart sounds: Normal heart sounds. Pulmonary:      Effort: Pulmonary effort is normal. No respiratory distress. Breath sounds: Decreased air movement (throughout) present. Abdominal:      General: Bowel sounds are normal.      Palpations: Abdomen is soft. Musculoskeletal:         General: No swelling, tenderness or deformity. Normal range of motion. Skin:     General: Skin is warm and dry. Neurological:      General: No focal deficit present. Mental Status: She is alert and oriented to person, place, and time. Psychiatric:         Mood and Affect: Mood normal.         Behavior: Behavior normal.         Thought Content: Thought content normal.         Judgment: Judgment normal.          LABORATORY FINDINGS:    CBC:  Lab Results   Component Value Date    WBC 7.0 04/22/2018    HGB 15.0 04/22/2018     04/22/2018     04/18/2012       BMP:    Lab Results   Component Value Date     04/22/2018    K 4.0 04/22/2018     04/22/2018    CO2 25 04/22/2018    BUN 14 04/22/2018    CREATININE 0.44 04/22/2018    GLUCOSE 302 04/22/2018    GLUCOSE 79 04/18/2012       HEMOGLOBIN A1C:   Lab Results   Component Value Date    LABA1C 5.7 10/07/2021       FASTING LIPID PANEL:  Lab Results   Component Value Date    CHOL 153 04/22/2018    HDL 20 (L) 04/22/2018    TRIG 292 (H) 04/22/2018       ASSESSMENT AND PLAN:      1. Encounter for screening mammogram for breast cancer  - [facility] Digital Screen Bilateral [[id number]]; Future    2. Type 2 diabetes mellitus without complication, without long-term current use of insulin (HCC)  - diet controlled, HgA1C 5.7  - Microalbumin, Ur; Future    3.  COPD exacerbation ([address]  - NAD, however due to length of symptoms without improvement (2-3 weeks) continue oral steroids, add duoneb aerosol, mucinex for chest congestion and order CXR to r/o pneumonia  - predniSONE (DELTASONE) 20 MG tablet; Take 2 tablets by mouth daily for 5 days, THEN 1 tablet daily for 5 days, THEN 0.5 tablets daily for 5 days. Dispense: 20 tablet; Refill: 0  - ipratropium-albuterol (DUONEB) 0.5-2.5 (3) MG/3ML SOLN nebulizer solution; Inhale 3 mLs into the lungs every 4 hours as needed for Shortness of Breath  Dispense: 360 mL; Refill: 3  - guaiFENesin (MUCINEX) 600 MG extended release tablet; Take 1 tablet by mouth 2 times daily for 15 days  Dispense: 30 tablet; Refill: 0  - XR CHEST (SINGLE VIEW FRONTAL); Future      FOLLOW UP AND INSTRUCTIONS:   Return if symptoms worsen or fail to improve. Sonny Thomas received counseling on the following healthy behaviors: nutrition and medication adherence    Discussed use, benefit, and side effects of prescribed medications. Barriers to medication compliance addressed. All patient questions answered. Patient voiced understanding. Patient given educational materials - see patient instructions    RAFA De La O - CNP   [facility]  10/21/2021, 3:36 PM    Please note that this chart was generated using voice recognition Dragon dictation software. Although everyeffort was made to ensure the accuracy of this automated transcription, some errors in transcription may have occurred.

## 2022-01-27 DIAGNOSIS — E03.9 HYPOTHYROIDISM, UNSPECIFIED TYPE: ICD-10-CM

## 2022-01-27 RX ORDER — LEVOTHYROXINE SODIUM 125 UG/1
TABLET ORAL
Qty: 60 TABLET | Refills: 0 | Status: SHIPPED | OUTPATIENT
Start: 2022-01-27 | End: 2022-01-28 | Stop reason: SDUPTHER

## 2022-01-28 DIAGNOSIS — E03.9 HYPOTHYROIDISM, UNSPECIFIED TYPE: ICD-10-CM

## 2022-01-28 RX ORDER — LEVOTHYROXINE SODIUM 0.12 MG/1
125 TABLET ORAL 2 TIMES DAILY
Qty: 180 TABLET | Refills: 1 | Status: SHIPPED | OUTPATIENT
Start: 2022-01-28

## 2022-01-28 NOTE — TELEPHONE ENCOUNTER
This was recently approved but only for a 30 day supply. Her insurance charges more for a 30 day supply. Can you please approve the 90 day supply?

## 2022-02-02 ENCOUNTER — OFFICE VISIT (OUTPATIENT)
Dept: INTERNAL MEDICINE CLINIC | Age: 47
End: 2022-02-02
Payer: COMMERCIAL

## 2022-02-02 ENCOUNTER — HOSPITAL ENCOUNTER (OUTPATIENT)
Dept: CT IMAGING | Age: 47
Discharge: HOME | End: 2022-02-04
Payer: COMMERCIAL

## 2022-02-02 ENCOUNTER — HOSPITAL ENCOUNTER (OUTPATIENT)
Age: 47
Setting detail: SPECIMEN
Discharge: HOME | End: 2022-02-02

## 2022-02-02 ENCOUNTER — HOSPITAL ENCOUNTER (OUTPATIENT)
Age: 47
Discharge: HOME | End: 2022-02-02
Payer: COMMERCIAL

## 2022-02-02 VITALS
SYSTOLIC BLOOD PRESSURE: 130 MMHG | BODY MASS INDEX: 35.51 KG/M2 | DIASTOLIC BLOOD PRESSURE: 70 MMHG | WEIGHT: 193 LBS | HEIGHT: 62 IN

## 2022-02-02 DIAGNOSIS — R10.32 LLQ ABDOMINAL PAIN: ICD-10-CM

## 2022-02-02 DIAGNOSIS — R10.9 SUDDEN ONSET OF SEVERE ABDOMINAL PAIN: ICD-10-CM

## 2022-02-02 DIAGNOSIS — R10.9 SUDDEN ONSET OF SEVERE ABDOMINAL PAIN: Primary | ICD-10-CM

## 2022-02-02 DIAGNOSIS — K57.92 ACUTE DIVERTICULITIS: Primary | ICD-10-CM

## 2022-02-02 LAB
ALBUMIN SERPL-MCNC: 4.3 G/DL (ref 3.5–5.2)
ALBUMIN/GLOBULIN RATIO: ABNORMAL (ref 1–2.5)
ALP BLD-CCNC: 104 U/L (ref 35–104)
ALT SERPL-CCNC: 27 U/L (ref 5–33)
ANION GAP SERPL CALCULATED.3IONS-SCNC: 12 MMOL/L (ref 9–17)
AST SERPL-CCNC: 19 U/L
BILIRUB SERPL-MCNC: 0.36 MG/DL (ref 0.3–1.2)
BILIRUBIN, POC: NEGATIVE
BLOOD URINE, POC: NEGATIVE
BUN BLDV-MCNC: 9 MG/DL (ref 6–20)
BUN/CREAT BLD: ABNORMAL (ref 9–20)
CALCIUM SERPL-MCNC: 9.4 MG/DL (ref 8.6–10.4)
CHLORIDE BLD-SCNC: 103 MMOL/L (ref 98–107)
CLARITY, POC: CLEAR
CO2: 24 MMOL/L (ref 20–31)
COLOR, POC: YELLOW
CREAT SERPL-MCNC: 0.49 MG/DL (ref 0.5–0.9)
GFR AFRICAN AMERICAN: >60 ML/MIN
GFR NON-AFRICAN AMERICAN: >60 ML/MIN
GFR SERPL CREATININE-BSD FRML MDRD: ABNORMAL ML/MIN/{1.73_M2}
GFR SERPL CREATININE-BSD FRML MDRD: ABNORMAL ML/MIN/{1.73_M2}
GLUCOSE BLD-MCNC: 105 MG/DL (ref 70–99)
GLUCOSE URINE, POC: NEGATIVE
HCT VFR BLD CALC: 39.1 % (ref 36–46)
HEMOGLOBIN: 13.7 G/DL (ref 12–16)
KETONES, POC: NEGATIVE
LEUKOCYTE EST, POC: NEGATIVE
MCH RBC QN AUTO: 32.8 PG (ref 26–34)
MCHC RBC AUTO-ENTMCNC: 35 G/DL (ref 31–37)
MCV RBC AUTO: 93.6 FL (ref 80–100)
NITRITE, POC: NEGATIVE
NRBC AUTOMATED: NORMAL PER 100 WBC
PDW BLD-RTO: 12.1 % (ref 11.5–14.9)
PH, POC: 6
PLATELET # BLD: 222 K/UL (ref 150–450)
PMV BLD AUTO: 8.4 FL (ref 6–12)
POTASSIUM SERPL-SCNC: 4 MMOL/L (ref 3.7–5.3)
PROTEIN, POC: NEGATIVE
RBC # BLD: 4.18 M/UL (ref 4–5.2)
SODIUM BLD-SCNC: 139 MMOL/L (ref 135–144)
SPECIFIC GRAVITY, POC: 1.01
TOTAL PROTEIN: 7.2 G/DL (ref 6.4–8.3)
UROBILINOGEN, POC: 0.2
WBC # BLD: 10.3 K/UL (ref 3.5–11)

## 2022-02-02 PROCEDURE — 87086 URINE CULTURE/COLONY COUNT: CPT

## 2022-02-02 PROCEDURE — 80053 COMPREHEN METABOLIC PANEL: CPT

## 2022-02-02 PROCEDURE — 6360000004 HC RX CONTRAST MEDICATION: Performed by: NURSE PRACTITIONER

## 2022-02-02 PROCEDURE — 99213 OFFICE O/P EST LOW 20 MIN: CPT | Performed by: NURSE PRACTITIONER

## 2022-02-02 PROCEDURE — 85027 COMPLETE CBC AUTOMATED: CPT

## 2022-02-02 PROCEDURE — 74177 CT ABD & PELVIS W/CONTRAST: CPT

## 2022-02-02 PROCEDURE — 36415 COLL VENOUS BLD VENIPUNCTURE: CPT

## 2022-02-02 PROCEDURE — 81003 URINALYSIS AUTO W/O SCOPE: CPT | Performed by: NURSE PRACTITIONER

## 2022-02-02 PROCEDURE — 2580000003 HC RX 258: Performed by: NURSE PRACTITIONER

## 2022-02-02 RX ORDER — 0.9 % SODIUM CHLORIDE 0.9 %
80 INTRAVENOUS SOLUTION INTRAVENOUS ONCE
Status: COMPLETED | OUTPATIENT
Start: 2022-02-02 | End: 2022-02-02

## 2022-02-02 RX ORDER — CIPROFLOXACIN 500 MG/1
500 TABLET, FILM COATED ORAL 2 TIMES DAILY
Qty: 20 TABLET | Refills: 0 | Status: SHIPPED | OUTPATIENT
Start: 2022-02-02 | End: 2022-02-12

## 2022-02-02 RX ORDER — METRONIDAZOLE 500 MG/1
500 TABLET ORAL 3 TIMES DAILY
Qty: 30 TABLET | Refills: 0 | Status: SHIPPED | OUTPATIENT
Start: 2022-02-02 | End: 2022-02-12

## 2022-02-02 RX ORDER — SODIUM CHLORIDE 0.9 % (FLUSH) 0.9 %
10 SYRINGE (ML) INJECTION PRN
Status: DISCONTINUED | OUTPATIENT
Start: 2022-02-02 | End: 2022-02-05 | Stop reason: HOSPADM

## 2022-02-02 RX ADMIN — SODIUM CHLORIDE 80 ML: 9 INJECTION, SOLUTION INTRAVENOUS at 13:06

## 2022-02-02 RX ADMIN — SODIUM CHLORIDE, PRESERVATIVE FREE 10 ML: 5 INJECTION INTRAVENOUS at 13:06

## 2022-02-02 RX ADMIN — IOPAMIDOL 75 ML: 755 INJECTION, SOLUTION INTRAVENOUS at 13:06

## 2022-02-02 RX ADMIN — IOHEXOL 50 ML: 240 INJECTION, SOLUTION INTRATHECAL; INTRAVASCULAR; INTRAVENOUS; ORAL at 13:06

## 2022-02-02 ASSESSMENT — ENCOUNTER SYMPTOMS
COUGH: 0
NAUSEA: 0
VOMITING: 0
CHEST TIGHTNESS: 0
SORE THROAT: 0
SHORTNESS OF BREATH: 0
BACK PAIN: 1
ABDOMINAL PAIN: 1
COLOR CHANGE: 0
RHINORRHEA: 0
WHEEZING: 0
CONSTIPATION: 0
DIARRHEA: 0
TROUBLE SWALLOWING: 0

## 2022-02-02 ASSESSMENT — PATIENT HEALTH QUESTIONNAIRE - PHQ9
4. FEELING TIRED OR HAVING LITTLE ENERGY: 0
SUM OF ALL RESPONSES TO PHQ QUESTIONS 1-9: 0
SUM OF ALL RESPONSES TO PHQ9 QUESTIONS 1 & 2: 0
9. THOUGHTS THAT YOU WOULD BE BETTER OFF DEAD, OR OF HURTING YOURSELF: 0
1. LITTLE INTEREST OR PLEASURE IN DOING THINGS: 0
2. FEELING DOWN, DEPRESSED OR HOPELESS: 0
5. POOR APPETITE OR OVEREATING: 0
SUM OF ALL RESPONSES TO PHQ QUESTIONS 1-9: 0
3. TROUBLE FALLING OR STAYING ASLEEP: 0
10. IF YOU CHECKED OFF ANY PROBLEMS, HOW DIFFICULT HAVE THESE PROBLEMS MADE IT FOR YOU TO DO YOUR WORK, TAKE CARE OF THINGS AT HOME, OR GET ALONG WITH OTHER PEOPLE: 0
6. FEELING BAD ABOUT YOURSELF - OR THAT YOU ARE A FAILURE OR HAVE LET YOURSELF OR YOUR FAMILY DOWN: 0
7. TROUBLE CONCENTRATING ON THINGS, SUCH AS READING THE NEWSPAPER OR WATCHING TELEVISION: 0
8. MOVING OR SPEAKING SO SLOWLY THAT OTHER PEOPLE COULD HAVE NOTICED. OR THE OPPOSITE, BEING SO FIGETY OR RESTLESS THAT YOU HAVE BEEN MOVING AROUND A LOT MORE THAN USUAL: 0

## 2022-02-02 NOTE — PROGRESS NOTES
Visit Information    Have you changed or started any medications since your last visit including any over-the-counter medicines, vitamins, or herbal medicines? no   Are you having any side effects from any of your medications? -  no  Have you stopped taking any of your medications? Is so, why? -  no    Have you seen any other physician or provider since your last visit? No  Have you had any other diagnostic tests since your last visit? No  Have you been seen in the emergency room and/or had an admission to a hospital since we last saw you? No  Have you had your routine dental cleaning in the past 6 months? no    Have you activated your [employer] account? If not, what are your barriers?  Yes     Patient Care Team:  Sita Lees MD as PCP - General (Internal Medicine)  Sita Lees MD as PCP - [facility] Provider  Dylan Inman MD as Surgeon (General Surgery)    Medical History Review  Past Medical, Family, and Social History reviewed and does not contribute to the patient presenting condition    Health Maintenance   Topic Date Due    Hepatitis C screen  Never done    Depression Monitoring  Never done    HIV screen  Never done    Diabetic retinal exam  Never done    Hepatitis B vaccine (1 of 3 - Risk 3-dose series) Never done    Lipid screen  04/22/2019    TSH testing  04/23/2020    Breast cancer screen  04/26/2020    Diabetic microalbuminuria test  08/20/2020    Flu vaccine (1) 09/01/2021    Diabetic foot exam  09/24/2021    A1C test (Diabetic or Prediabetic)  10/07/2022    Potassium monitoring  02/02/2023    Creatinine monitoring  02/02/2023    Colon cancer screen colonoscopy  06/10/2029    DTaP/Tdap/Td vaccine (2 - Td or Tdap) 06/03/2030    Pneumococcal 0-64 years Vaccine (2 of 2 - PPSV23) 07/01/2038    COVID-19 Vaccine  Completed    Hepatitis A vaccine  Aged Out    Hib vaccine  Aged Out    Meningococcal (ACWY) vaccine  Aged Out         [address] [facility]  [address] [facility] [address]  Dept: [phone]  Dept Fax: [phone]    OfficeProgress/Follow Up Note  Date of patient's visit: 2/2/2022  Patient's Name:  Mirta Storm YOB: 1975            Patient Care Team:  Elisa Pinzon MD as PCP - General (Internal Medicine)  Elisa Pinzon MD as PCP - [facility]  Olivia Manrique MD as Surgeon (General Surgery)    REASON FOR VISIT:Same day visit    HISTORY OF PRESENT ILLNESS:      History was obtained from the patient.      Mirta Storm is a 48 y.o. female here today for Mirta Storm is a 48 y.o. female here today for Urinary Tract Infection (Pt states having sharp pain LLQ, onset 2 days, pt did a home uti test came up positive, no other symptoms)    Severe LLQ abdominal pain  Started 2 days ago  Home uti test was inconclusive for UTI (1st positive, 2nd negative)  States pain radiate from LLQ/ groin to R back  Pain is characterized as intermittent and sharp  Altered gait r/t pain  Denies injury or trauma to back, denies arthralgias  Denies fevers, chills, hematuria, dysuria, urgency or frequency with urination    Patient Active Problem List   Diagnosis    DVT (deep venous thrombosis) ([address]    Cerebral infarction ([address]    COPD (chronic obstructive pulmonary disease) ([address]    Pulmonary valve disease    Scoliosis    Major depressive disorder, recurrent episode, moderate ([address]    Hyperlipemia    Liver fatty degeneration    Essential hypertension    HDL lipoprotein deficiency    Varicose vein of leg    COPD exacerbation (HCC)    Abdominal pain    Back pain, chronic    Diarrhea    Rectal bleeding    Chondromalacia of patella    Pre-diabetes    Prediabetes    Hypercoagulable state ([address]    Intractable migraine without aura and without status migrainosus    Simple chronic bronchitis ([address]    Tobacco dependence    Hypothyroidism    Closed nondisplaced fracture of lateral malleolus of left fibula    Hypercalcemia    Hyperparathy[facility]    CKD (chronic kidney disease) stage 1, GFR 90 ml/min or greater    Hypophosphatemia    Hypomagnesemia    Type 2 diabetes mellitus without complication, without long-term current use of insulin ([facility]    Morbidly obese (HCC)    Family history of colon cancer in mother    Family history of colon cancer in father    History of colon polyps    Colon polyp    Cough with exposure to COVID-19 virus       MEDICATIONS:      Current Outpatient Medications   Medication Sig Dispense Refill    levothyroxine (EUTHYROX) 125 MCG tablet Take 1 tablet by mouth 2 times daily 180 tablet 1    ipratropium-albuterol (DUONEB) 0.5-2.5 (3) MG/3ML SOLN nebulizer solution Inhale 3 mLs into the lungs every 4 hours as needed for Shortness of Breath 360 mL 3    tiotropium (SPIRIVA HANDIHALER) 18 MCG inhalation capsule Inhale 1 capsule into the lungs daily 90 capsule 1    albuterol sulfate HFA (VENTOLIN HFA) 108 (90 Base) MCG/ACT inhaler Inhale 2 puffs into the lungs 4 times daily as needed for Wheezing 54 g 1    albuterol (PROVENTIL) (2.5 MG/3ML) 0.083% nebulizer solution Take 3 mLs by nebulization every 4 hours as needed for Wheezing 300 mL 9    metFORMIN (GLUCOPHAGE) 1000 MG tablet Take 1 tablet by mouth 2 times daily (with meals) 180 tablet 3    Respiratory Therapy Supplies (NEBULIZER AIR TUBE/PLUGS) MISC 1 each by Does not apply route daily 1 each 0    aspirin 325 MG EC tablet Take 81 mg by mouth (Patient not taking: Reported on 10/7/2021)       No current facility-administered medications for this visit.      Facility-Administered Medications Ordered in Other Visits   Medication Dose Route Frequency Provider Last Rate Last Admin    0.9 % sodium chloride bolus  80 mL IntraVENous Once RAFA Domínguez CNP        sodium chloride flush 0.9 % injection 10 mL  10 mL IntraVENous PRN RAFA Domínguez CNP        iohexol (OMNIPAQUE 240) injection 50 mL  50 mL Oral ONCE PRN Davis Suazo APRN - CNP        iopamidol (ISOVUE-370) 76 % injection 75 mL  75 mL IntraVENous ONCE PRN Davis Suazo, APRN - CNP           ALLERGIES:      Allergies   Allergen Reactions    Atorvastatin Hives    Cymbalta [Duloxetine Hcl] Hives    Lipitor Hives    Tape [Adhesive Tape] Hives and Other (See Comments)     blister       SOCIAL HISTORY   Reviewed and no change from previous record. Israel Emerson  reports that she has been smoking cigarettes. She started smoking about 26 years ago. She has a 3.00 pack-year smoking history. She has never used smokeless tobacco.    FAMILY HISTORY:    Reviewed and No change from previous visit    REVIEW OF SYSTEMS:    Review of Systems   Constitutional: Positive for fatigue. Negative for chills, diaphoresis, fever and unexpected weight change. HENT: Negative for congestion, postnasal drip, rhinorrhea, sneezing, sore throat and trouble swallowing. Eyes: Negative for visual disturbance. Respiratory: Negative for cough, chest tightness, shortness of breath and wheezing. Cardiovascular: Negative for chest pain. Gastrointestinal: Positive for abdominal pain (LLQ). Negative for constipation, diarrhea, nausea and vomiting. Endocrine: Negative for polydipsia, polyphagia and polyuria. Genitourinary: Negative for difficulty urinating, dysuria, flank pain, frequency and urgency. Musculoskeletal: Positive for back pain (R flank). Negative for arthralgias. Skin: Negative for color change and rash. Neurological: Negative for dizziness, tremors, syncope, weakness and numbness. Psychiatric/Behavioral: Negative for confusion and hallucinations. PHYSICAL EXAM:      Vitals:    02/02/22 1044   BP: 130/70   Site: Left Upper Arm   Position: Sitting   Weight: 193 lb (87.5 kg)   Height: 5' 2\" (1.575 m)       Physical Exam  Vitals reviewed. Constitutional:       Appearance: Normal appearance. She is obese. HENT:      Head: Normocephalic.    Cardiovascular:      Rate and Rhythm: Normal rate and regular rhythm. Pulses: Normal pulses. Heart sounds: Normal heart sounds. Pulmonary:      Effort: Pulmonary effort is normal.      Breath sounds: Normal breath sounds. Abdominal:      General: Bowel sounds are normal.      Palpations: Abdomen is soft. Tenderness: There is abdominal tenderness (severe tenderness noted in epigatric, LUQ and LLQ). There is no right CVA tenderness or left CVA tenderness. Musculoskeletal:         General: No swelling, tenderness or deformity. Normal range of motion. Skin:     General: Skin is warm and dry. Neurological:      General: No focal deficit present. Mental Status: She is alert and oriented to person, place, and time. Psychiatric:         Mood and Affect: Mood normal.         Behavior: Behavior normal.         Thought Content: Thought content normal.         Judgment: Judgment normal.          ASSESSMENT AND PLAN:      1. Sudden onset of severe abdominal pain  - Culture, Urine; Future  - Comprehensive Metabolic Panel; Future  - CBC; Future  - STAT CT ABDOMEN PELVIS W IV CONTRAST; Future  - POCT Urinalysis No Micro (Auto)- negative for UTI, send sample for culture    **deferred referral to ED due to pandemic/longer wait times and impending inclement weather. Advised that pending labs results patient may be instructed to go to ED for further evaluation/treatment    2. LLQ abdominal pain  - Culture, Urine; Future  - Comprehensive Metabolic Panel; Future  - CBC; Future  - CT ABDOMEN PELVIS W IV CONTRAST; Future  - POCT Urinalysis No Micro (Auto)      FOLLOW UP AND INSTRUCTIONS:   No follow-ups on file. Discussed use, benefit, and side effects of prescribed medications. All patient questions answered. Patient voiced understanding.      Patient given educational materials - see patient instructions    RAFA Gonzalez - CNP   [facility]  2/2/2022, 12:33 PM    Please note that this chart wasgenerated using voice recognition Dragon dictation software. Although every effort was made to ensure the accuracy of this automated transcription, some errors in transcription may have occurred.

## 2022-02-03 LAB
CULTURE: NO GROWTH
CULTURE: NO GROWTH
Lab: NORMAL
Lab: NORMAL
SPECIMEN DESCRIPTION: NORMAL
SPECIMEN DESCRIPTION: NORMAL

## 2022-03-16 ENCOUNTER — OFFICE VISIT (OUTPATIENT)
Dept: INTERNAL MEDICINE CLINIC | Age: 47
End: 2022-03-16
Payer: COMMERCIAL

## 2022-03-16 VITALS
DIASTOLIC BLOOD PRESSURE: 76 MMHG | WEIGHT: 194 LBS | SYSTOLIC BLOOD PRESSURE: 112 MMHG | HEART RATE: 97 BPM | BODY MASS INDEX: 35.7 KG/M2 | HEIGHT: 62 IN | OXYGEN SATURATION: 97 %

## 2022-03-16 DIAGNOSIS — E66.9 DIABETES MELLITUS TYPE 2 IN OBESE (HCC): Primary | ICD-10-CM

## 2022-03-16 DIAGNOSIS — Z12.31 ENCOUNTER FOR SCREENING MAMMOGRAM FOR BREAST CANCER: ICD-10-CM

## 2022-03-16 DIAGNOSIS — R23.2 HOT FLASHES: ICD-10-CM

## 2022-03-16 DIAGNOSIS — Z13.220 SCREENING FOR HYPERLIPIDEMIA: ICD-10-CM

## 2022-03-16 DIAGNOSIS — E11.69 DIABETES MELLITUS TYPE 2 IN OBESE (HCC): Primary | ICD-10-CM

## 2022-03-16 LAB — HBA1C MFR BLD: 5.8 %

## 2022-03-16 PROCEDURE — 83036 HEMOGLOBIN GLYCOSYLATED A1C: CPT | Performed by: NURSE PRACTITIONER

## 2022-03-16 PROCEDURE — 3044F HG A1C LEVEL LT 7.0%: CPT | Performed by: NURSE PRACTITIONER

## 2022-03-16 PROCEDURE — 99213 OFFICE O/P EST LOW 20 MIN: CPT | Performed by: NURSE PRACTITIONER

## 2022-03-16 ASSESSMENT — ENCOUNTER SYMPTOMS
SHORTNESS OF BREATH: 0
ABDOMINAL PAIN: 0
WHEEZING: 0
CONSTIPATION: 0
RHINORRHEA: 0
SORE THROAT: 0
DIARRHEA: 0
TROUBLE SWALLOWING: 0
COUGH: 0
CHEST TIGHTNESS: 0
COLOR CHANGE: 0
VOMITING: 0
NAUSEA: 0

## 2022-03-16 NOTE — PROGRESS NOTES
Visit Information    Have you changed or started any medications since your last visit including any over-the-counter medicines, vitamins, or herbal medicines? no   Are you having any side effects from any of your medications? -  no  Have you stopped taking any of your medications? Is so, why? -  no    Have you seen any other physician or provider since your last visit? No  Have you had any other diagnostic tests since your last visit? No  Have you been seen in the emergency room and/or had an admission to a hospital since we last saw you? No  Have you had your routine dental cleaning in the past 6 months? no    Have you activated your [employer] account? If not, what are your barriers?  Yes     Patient Care Team:  Montserrat Lutz MD as PCP - General (Internal Medicine)  Montserrat Lutz MD as PCP - [facility] Provider  Sue Harley MD as Surgeon (General Surgery)    Medical History Review  Past Medical, Family, and Social History reviewed and does contribute to the patient presenting condition    Health Maintenance   Topic Date Due    Hepatitis C screen  Never done    HIV screen  Never done    Diabetic retinal exam  Never done    Hepatitis B vaccine (1 of 3 - Risk 3-dose series) Never done    Lipid screen  04/22/2019    TSH testing  04/23/2020    Breast cancer screen  04/26/2020    Diabetic microalbuminuria test  08/20/2020    Flu vaccine (1) 09/01/2021    Diabetic foot exam  09/24/2021    Depression Monitoring  02/02/2023    Potassium monitoring  02/02/2023    Creatinine monitoring  02/02/2023    A1C test (Diabetic or Prediabetic)  03/16/2023    Colorectal Cancer Screen  06/10/2029    DTaP/Tdap/Td vaccine (2 - Td or Tdap) 06/03/2030    Pneumococcal 0-64 years Vaccine (2 of 2 - PPSV23) 07/01/2038    COVID-19 Vaccine  Completed    Hepatitis A vaccine  Aged Out    Hib vaccine  Aged Out    Meningococcal (ACWY) vaccine  Aged Out         [address] [facility]  [address] [facility] [facility]  Dept: [phone]  Dept Fax: [phone]    OfficeProgress/Follow Up Note  Date of patient's visit: 3/16/2022  Patient's Name:  Lukasz Dunn YOB: 1975            Patient Care Team:  Glenda Kussmaul, MD as PCP - General (Internal Medicine)  Glenda Kussmaul, MD as PCP - [facility]  Russel Angel MD as Surgeon (General Surgery)    REASON FOR VISIT:  Routine outpatient follow up    HISTORY OF PRESENT ILLNESS:        History was obtained from the patient. Lukasz Dunn is a 48 y.o. female here today for Diabetes        Recent hot flashes  Hysterectomy 17 years ago  Hypothyroidism, compliant with medication use  Endorses heart palpitations    DM  Recent hyperglycemia r/t medication noncompliance  Reported hyperglycemia BS 200s  Patient started taking metformin BID on 3/11/22  Blood sugars trending downward 120s-150s.    Denies hypoglycemia or blurred vision    Patient Active Problem List   Diagnosis    DVT (deep venous thrombosis) (HCC)    Cerebral infarction ([address]    COPD (chronic obstructive pulmonary disease) ([address]    Pulmonary valve disease    Scoliosis    Major depressive disorder, recurrent episode, moderate (HCC)    Hyperlipemia    Liver fatty degeneration    Essential hypertension    HDL lipoprotein deficiency    Varicose vein of leg    COPD exacerbation (HCC)    Abdominal pain    Back pain, chronic    Diarrhea    Rectal bleeding    Chondromalacia of patella    Pre-diabetes    Prediabetes    Hypercoagulable state ([address]    Intractable migraine without aura and without status migrainosus    Simple chronic bronchitis (HCC)    Tobacco dependence    Hypothyroidism    Closed nondisplaced fracture of lateral malleolus of left fibula    Hypercalcemia    Hyperparathyroidism (HCC)    CKD (chronic kidney disease) stage 1, GFR 90 ml/min or greater    Hypophosphatemia    Hypomagnesemia    Type 2 diabetes mellitus without complication, without long-term current use of insulin ([facility]    Morbidly obese ([facility]    Family history of colon cancer in mother    Family history of colon cancer in father    History of colon polyps    Colon polyp    Cough with exposure to COVID-19 virus       Health Maintenance Due   Topic Date Due    Hepatitis C screen  Never done    HIV screen  Never done    Diabetic retinal exam  Never done    Hepatitis B vaccine (1 of 3 - Risk 3-dose series) Never done    Lipid screen  04/22/2019    TSH testing  04/23/2020    Breast cancer screen  04/26/2020    Diabetic microalbuminuria test  08/20/2020    Flu vaccine (1) 09/01/2021    Diabetic foot exam  09/24/2021       MEDICATIONS:      Current Outpatient Medications   Medication Sig Dispense Refill    metFORMIN (GLUCOPHAGE) 1000 MG tablet Take 1 tablet by mouth 2 times daily (with meals) 180 tablet 3    levothyroxine (EUTHYROX) 125 MCG tablet Take 1 tablet by mouth 2 times daily 180 tablet 1    ipratropium-albuterol (DUONEB) 0.5-2.5 (3) MG/3ML SOLN nebulizer solution Inhale 3 mLs into the lungs every 4 hours as needed for Shortness of Breath 360 mL 3    aspirin 325 MG EC tablet Take 81 mg by mouth       tiotropium (SPIRIVA HANDIHALER) 18 MCG inhalation capsule Inhale 1 capsule into the lungs daily 90 capsule 1    albuterol sulfate HFA (VENTOLIN HFA) 108 (90 Base) MCG/ACT inhaler Inhale 2 puffs into the lungs 4 times daily as needed for Wheezing 54 g 1    albuterol (PROVENTIL) (2.5 MG/3ML) 0.083% nebulizer solution Take 3 mLs by nebulization every 4 hours as needed for Wheezing 300 mL 9    Respiratory Therapy Supplies (NEBULIZER AIR TUBE/PLUGS) MISC 1 each by Does not apply route daily 1 each 0     No current facility-administered medications for this visit.        ALLERGIES:      Allergies   Allergen Reactions    Atorvastatin Hives    Cymbalta [Duloxetine Hcl] Hives    Lipitor Hives    Tape [Adhesive Tape] Hives and Other (See Comments)     blister         SOCIAL HISTORY    Reviewed and no change from previous record. Sneha Worley  reports that she has been smoking cigarettes. She started smoking about 26 years ago. She has a 3.00 pack-year smoking history. She has never used smokeless tobacco.    FAMILY HISTORY:    Reviewed and No change from previous visit    REVIEW OF SYSTEMS:    Review of Systems   Constitutional: Positive for diaphoresis (hot flashes). Negative for chills, fatigue, fever and unexpected weight change. HENT: Negative for congestion, postnasal drip, rhinorrhea, sneezing, sore throat and trouble swallowing. Eyes: Negative for visual disturbance. Respiratory: Negative for cough, chest tightness, shortness of breath and wheezing. Cardiovascular: Positive for palpitations. Negative for chest pain. Gastrointestinal: Negative for abdominal pain, constipation, diarrhea, nausea and vomiting. Endocrine: Negative for polydipsia, polyphagia and polyuria. Genitourinary: Negative for difficulty urinating, dysuria, flank pain, frequency and urgency. Musculoskeletal: Negative for arthralgias. Skin: Negative for color change and rash. Neurological: Negative for dizziness, tremors, syncope, weakness and numbness. Psychiatric/Behavioral: Negative for confusion and hallucinations. PHYSICAL EXAM:      Vitals:    03/16/22 1526   BP: 112/76   Pulse: 97   SpO2: 97%   Weight: 194 lb (88 kg)   Height: 5' 2\" (1.575 m)     BP Readings from Last 3 Encounters:   03/16/22 112/76   02/02/22 130/70   10/21/21 112/82      Wt Readings from Last 3 Encounters:   03/16/22 194 lb (88 kg)   02/02/22 193 lb (87.5 kg)   10/21/21 195 lb (88.5 kg)       Physical Exam  Vitals reviewed. Constitutional:       Appearance: Normal appearance. HENT:      Head: Normocephalic. Cardiovascular:      Rate and Rhythm: Normal rate and regular rhythm. Pulses: Normal pulses. Heart sounds: Normal heart sounds.    Pulmonary:      Effort: Pulmonary effort is normal. Breath sounds: Normal breath sounds. Abdominal:      General: Bowel sounds are normal.      Palpations: Abdomen is soft. Musculoskeletal:         General: No swelling, tenderness or deformity. Normal range of motion. Skin:     General: Skin is warm and dry. Neurological:      General: No focal deficit present. Mental Status: She is alert and oriented to person, place, and time. Psychiatric:         Mood and Affect: Mood normal.         Behavior: Behavior normal.         Thought Content: Thought content normal.         Judgment: Judgment normal.          LABORATORY FINDINGS:    CBC:  Lab Results   Component Value Date    WBC 10.3 02/02/2022    HGB 13.7 02/02/2022     02/02/2022     04/18/2012       BMP:    Lab Results   Component Value Date     02/02/2022    K 4.0 02/02/2022     02/02/2022    CO2 24 02/02/2022    BUN 9 02/02/2022    CREATININE 0.49 02/02/2022    GLUCOSE 105 02/02/2022    GLUCOSE 79 04/18/2012       HEMOGLOBIN A1C:   Lab Results   Component Value Date    LABA1C 5.8 03/16/2022       FASTING LIPID PANEL:  Lab Results   Component Value Date    CHOL 153 04/22/2018    HDL 20 (L) 04/22/2018    TRIG 292 (H) 04/22/2018       ASSESSMENT AND PLAN:      1. Diabetes mellitus type 2 in obese (HCC)  - no medication changes at this time, continue metformin, diabetic diet and monitor blood sugars  - Microalbumin, Ur; Future  - POCT glycosylated hemoglobin (Hb A1C)    2. Hot flashes  - TSH With Reflex Ft4; Future    3. Encounter for screening mammogram for breast cancer  - ILIANA Digital Screen Bilateral [[id number]]; Future    4. Screening for hyperlipidemia  - Lipid, Fasting; Future      FOLLOW UP AND INSTRUCTIONS:   Return in about 4 weeks (around 4/13/2022). Augusta Galvez received counseling on the following healthy behaviors: nutrition, exercise and medication adherence    Discussed use, benefit, and side effects of prescribed medications.   Barriers to medication compliance addressed. All patient questions answered. Patient voiced understanding. Patient given educational materials - see patient instructions    RAFA Lee - CNP   [facility]  3/16/2022, 4:45 PM    Please note that this chart was generated using voice recognition Dragon dictation software. Although everyeffort was made to ensure the accuracy of this automated transcription, some errors in transcription may have occurred.

## 2022-05-05 ENCOUNTER — TELEPHONE (OUTPATIENT)
Dept: INTERNAL MEDICINE CLINIC | Age: 47
End: 2022-05-05

## 2022-05-05 NOTE — TELEPHONE ENCOUNTER
Patient contacted [address] [facility] scheduling for Mammogram and was informed that order needs to be changed since she has a left breast lump.     Will need a diagnostic left breast mammogram

## 2022-05-06 DIAGNOSIS — N63.20 LEFT BREAST LUMP: Primary | ICD-10-CM

## 2022-05-19 ENCOUNTER — HOSPITAL ENCOUNTER (OUTPATIENT)
Dept: WOMENS IMAGING | Age: 47
Discharge: HOME | End: 2022-05-21
Payer: COMMERCIAL

## 2022-05-19 DIAGNOSIS — N63.0 PALPABLE MASS OF BREAST: ICD-10-CM

## 2022-05-19 DIAGNOSIS — N63.20 LEFT BREAST LUMP: ICD-10-CM

## 2022-05-19 PROCEDURE — 76642 ULTRASOUND BREAST LIMITED: CPT

## 2022-05-19 PROCEDURE — G0279 TOMOSYNTHESIS, MAMMO: HCPCS

## 2022-05-20 ENCOUNTER — TELEPHONE (OUTPATIENT)
Dept: INTERNAL MEDICINE CLINIC | Age: 47
End: 2022-05-20

## 2022-05-23 ENCOUNTER — HOSPITAL ENCOUNTER (EMERGENCY)
Age: 47
Discharge: HOME | End: 2022-05-23
Attending: EMERGENCY MEDICINE
Payer: COMMERCIAL

## 2022-05-23 ENCOUNTER — APPOINTMENT (OUTPATIENT)
Dept: GENERAL RADIOLOGY | Age: 47
End: 2022-05-23
Payer: COMMERCIAL

## 2022-05-23 VITALS
TEMPERATURE: 98.2 F | HEART RATE: 75 BPM | HEIGHT: 62 IN | BODY MASS INDEX: 34.96 KG/M2 | WEIGHT: 190 LBS | SYSTOLIC BLOOD PRESSURE: 112 MMHG | RESPIRATION RATE: 18 BRPM | OXYGEN SATURATION: 95 % | DIASTOLIC BLOOD PRESSURE: 76 MMHG

## 2022-05-23 DIAGNOSIS — R10.13 ABDOMINAL PAIN, EPIGASTRIC: Primary | ICD-10-CM

## 2022-05-23 LAB
ABSOLUTE EOS #: 0.19 K/UL (ref 0–0.44)
ABSOLUTE IMMATURE GRANULOCYTE: <0.03 K/UL (ref 0–0.3)
ABSOLUTE LYMPH #: 2 K/UL (ref 1.1–3.7)
ABSOLUTE MONO #: 0.48 K/UL (ref 0.1–1.2)
ALBUMIN SERPL-MCNC: 4.3 G/DL (ref 3.5–5.2)
ALBUMIN/GLOBULIN RATIO: 1.6 (ref 1–2.5)
ALP BLD-CCNC: 91 U/L (ref 35–104)
ALT SERPL-CCNC: 23 U/L (ref 5–33)
ANION GAP SERPL CALCULATED.3IONS-SCNC: 12 MMOL/L (ref 9–17)
AST SERPL-CCNC: 18 U/L
BASOPHILS # BLD: 1 % (ref 0–2)
BASOPHILS ABSOLUTE: 0.04 K/UL (ref 0–0.2)
BILIRUB SERPL-MCNC: 0.62 MG/DL (ref 0.3–1.2)
BUN BLDV-MCNC: 9 MG/DL (ref 6–20)
CALCIUM SERPL-MCNC: 9.2 MG/DL (ref 8.6–10.4)
CHLORIDE BLD-SCNC: 101 MMOL/L (ref 98–107)
CO2: 22 MMOL/L (ref 20–31)
CREAT SERPL-MCNC: 0.53 MG/DL (ref 0.5–0.9)
EKG ATRIAL RATE: 85 BPM
EKG P AXIS: 64 DEGREES
EKG P-R INTERVAL: 154 MS
EKG Q-T INTERVAL: 380 MS
EKG QRS DURATION: 92 MS
EKG QTC CALCULATION (BAZETT): 452 MS
EKG R AXIS: 57 DEGREES
EKG T AXIS: 29 DEGREES
EKG VENTRICULAR RATE: 85 BPM
EOSINOPHILS RELATIVE PERCENT: 3 % (ref 1–4)
GFR AFRICAN AMERICAN: >60 ML/MIN
GFR NON-AFRICAN AMERICAN: >60 ML/MIN
GFR SERPL CREATININE-BSD FRML MDRD: ABNORMAL ML/MIN/{1.73_M2}
GLUCOSE BLD-MCNC: 179 MG/DL (ref 70–99)
HCT VFR BLD CALC: 39.1 % (ref 36.3–47.1)
HEMOGLOBIN: 13.4 G/DL (ref 11.9–15.1)
IMMATURE GRANULOCYTES: 0 %
LACTIC ACID, WHOLE BLOOD: 1.2 MMOL/L (ref 0.7–2.1)
LIPASE: 29 U/L (ref 13–60)
LYMPHOCYTES # BLD: 28 % (ref 24–43)
MCH RBC QN AUTO: 33.2 PG (ref 25.2–33.5)
MCHC RBC AUTO-ENTMCNC: 34.3 G/DL (ref 28.4–34.8)
MCV RBC AUTO: 96.8 FL (ref 82.6–102.9)
MONOCYTES # BLD: 7 % (ref 3–12)
NRBC AUTOMATED: 0 PER 100 WBC
PDW BLD-RTO: 11.7 % (ref 11.8–14.4)
PLATELET # BLD: 200 K/UL (ref 138–453)
PMV BLD AUTO: 10.2 FL (ref 8.1–13.5)
POTASSIUM SERPL-SCNC: 3.7 MMOL/L (ref 3.7–5.3)
RBC # BLD: 4.04 M/UL (ref 3.95–5.11)
SEG NEUTROPHILS: 61 % (ref 36–65)
SEGMENTED NEUTROPHILS ABSOLUTE COUNT: 4.45 K/UL (ref 1.5–8.1)
SODIUM BLD-SCNC: 135 MMOL/L (ref 135–144)
TOTAL PROTEIN: 7 G/DL (ref 6.4–8.3)
TROPONIN, HIGH SENSITIVITY: <6 NG/L (ref 0–14)
WBC # BLD: 7.2 K/UL (ref 3.5–11.3)

## 2022-05-23 PROCEDURE — 93010 ELECTROCARDIOGRAM REPORT: CPT | Performed by: INTERNAL MEDICINE

## 2022-05-23 PROCEDURE — 80053 COMPREHEN METABOLIC PANEL: CPT

## 2022-05-23 PROCEDURE — 83690 ASSAY OF LIPASE: CPT

## 2022-05-23 PROCEDURE — 6370000000 HC RX 637 (ALT 250 FOR IP): Performed by: STUDENT IN AN ORGANIZED HEALTH CARE EDUCATION/TRAINING PROGRAM

## 2022-05-23 PROCEDURE — 96375 TX/PRO/DX INJ NEW DRUG ADDON: CPT

## 2022-05-23 PROCEDURE — 85025 COMPLETE CBC W/AUTO DIFF WBC: CPT

## 2022-05-23 PROCEDURE — 71046 X-RAY EXAM CHEST 2 VIEWS: CPT

## 2022-05-23 PROCEDURE — 93005 ELECTROCARDIOGRAM TRACING: CPT | Performed by: STUDENT IN AN ORGANIZED HEALTH CARE EDUCATION/TRAINING PROGRAM

## 2022-05-23 PROCEDURE — 6360000002 HC RX W HCPCS: Performed by: STUDENT IN AN ORGANIZED HEALTH CARE EDUCATION/TRAINING PROGRAM

## 2022-05-23 PROCEDURE — A4216 STERILE WATER/SALINE, 10 ML: HCPCS | Performed by: STUDENT IN AN ORGANIZED HEALTH CARE EDUCATION/TRAINING PROGRAM

## 2022-05-23 PROCEDURE — 99285 EMERGENCY DEPT VISIT HI MDM: CPT

## 2022-05-23 PROCEDURE — 96374 THER/PROPH/DIAG INJ IV PUSH: CPT

## 2022-05-23 PROCEDURE — 2580000003 HC RX 258: Performed by: STUDENT IN AN ORGANIZED HEALTH CARE EDUCATION/TRAINING PROGRAM

## 2022-05-23 PROCEDURE — 83605 ASSAY OF LACTIC ACID: CPT

## 2022-05-23 PROCEDURE — 84484 ASSAY OF TROPONIN QUANT: CPT

## 2022-05-23 PROCEDURE — C9113 INJ PANTOPRAZOLE SODIUM, VIA: HCPCS | Performed by: STUDENT IN AN ORGANIZED HEALTH CARE EDUCATION/TRAINING PROGRAM

## 2022-05-23 RX ORDER — MAGNESIUM HYDROXIDE/ALUMINUM HYDROXICE/SIMETHICONE 120; 1200; 1200 MG/30ML; MG/30ML; MG/30ML
15 SUSPENSION ORAL ONCE
Status: COMPLETED | OUTPATIENT
Start: 2022-05-23 | End: 2022-05-23

## 2022-05-23 RX ORDER — ONDANSETRON 2 MG/ML
4 INJECTION INTRAMUSCULAR; INTRAVENOUS ONCE
Status: COMPLETED | OUTPATIENT
Start: 2022-05-23 | End: 2022-05-23

## 2022-05-23 RX ORDER — ONDANSETRON 4 MG/1
4 TABLET, ORALLY DISINTEGRATING ORAL EVERY 8 HOURS PRN
Qty: 10 TABLET | Refills: 0 | Status: SHIPPED | OUTPATIENT
Start: 2022-05-23

## 2022-05-23 RX ORDER — PANTOPRAZOLE SODIUM 20 MG/1
40 TABLET, DELAYED RELEASE ORAL DAILY
Qty: 30 TABLET | Refills: 0 | Status: SHIPPED | OUTPATIENT
Start: 2022-05-23

## 2022-05-23 RX ORDER — LIDOCAINE HYDROCHLORIDE 20 MG/ML
15 SOLUTION OROPHARYNGEAL ONCE
Status: DISCONTINUED | OUTPATIENT
Start: 2022-05-23 | End: 2022-05-23 | Stop reason: HOSPADM

## 2022-05-23 RX ADMIN — ALUMINUM HYDROXIDE, MAGNESIUM HYDROXIDE, AND SIMETHICONE 15 ML: 200; 200; 20 SUSPENSION ORAL at 04:19

## 2022-05-23 RX ADMIN — ONDANSETRON 4 MG: 2 INJECTION INTRAMUSCULAR; INTRAVENOUS at 04:19

## 2022-05-23 RX ADMIN — SODIUM CHLORIDE 40 MG: 9 INJECTION, SOLUTION INTRAMUSCULAR; INTRAVENOUS; SUBCUTANEOUS at 04:19

## 2022-05-23 ASSESSMENT — ENCOUNTER SYMPTOMS
NAUSEA: 1
DIARRHEA: 1
BLOOD IN STOOL: 0
SHORTNESS OF BREATH: 0
ABDOMINAL PAIN: 1
VOMITING: 1
CONSTIPATION: 0
COUGH: 0
RHINORRHEA: 0
SORE THROAT: 0

## 2022-05-23 ASSESSMENT — PAIN DESCRIPTION - DESCRIPTORS: DESCRIPTORS: CRAMPING;TIGHTNESS

## 2022-05-23 ASSESSMENT — PAIN SCALES - GENERAL
PAINLEVEL_OUTOF10: 10
PAINLEVEL_OUTOF10: 10

## 2022-05-23 ASSESSMENT — PAIN DESCRIPTION - LOCATION: LOCATION: ABDOMEN

## 2022-05-23 ASSESSMENT — PAIN - FUNCTIONAL ASSESSMENT: PAIN_FUNCTIONAL_ASSESSMENT: 0-10

## 2022-05-23 NOTE — ED NOTES
Labeled blood specimens sent to lab via tube system.     [x] Lavender   [] on ice   [x] Blue   [x] Green/yellow  [x] Green/black [] on ice  [] Pink  [] Red  [] Yellow  [] Blood Cultures        Callum Vargas RN  05/23/22 [id number]

## 2022-05-23 NOTE — Clinical Note
Ara Multani was seen and treated in our emergency department on 5/23/2022. She may return to work on 05/23/2022. Lorna Noriega brought his mother into the emergency department for evaluation. If you have any questions or concerns, please don't hesitate to call.       Luca Martinze MD

## 2022-05-23 NOTE — ED PROVIDER NOTES
DO     Miguel Gallo, DO  05/23/22 [address] Nils Appiah, DO  05/23/22 [address] [facility] [address], DO  05/23/22 0490       Miguel Gallo, DO  05/23/22 9406

## 2022-05-23 NOTE — ED PROVIDER NOTES
[facility]  ED  Emergency Department Encounter  EmergencyMedicine Resident     Pt Hafsa Baumann  MRN: [de-identified]  [place] [id number]  Date of evaluation: 5/23/22  PCP:  Lidia Jang MD    This patient was evaluated in the Emergency Department for symptoms described in the history of present illness. The patient was evaluated in the context of the global COVID-19 pandemic, which necessitated consideration that the patient might be at risk for infection with the SARS-CoV-2 virus that causes COVID-19. Institutional protocols and algorithms that pertain to the evaluation of patients at risk for COVID-19 are in a state of rapid change based on information released by regulatory bodies including the CDC and federal and state organizations. These policies and algorithms were followed during the patient's care in the ED. CHIEF COMPLAINT       Chief Complaint   Patient presents with    Abdominal Pain    Nausea    Diarrhea       HISTORY OF PRESENT ILLNESS  (Location/Symptom, Timing/Onset, Context/Setting, Quality, Duration, Modifying Factors, Severity.)      Shena Duarte is a 48 y.o. female who presents with abdominal pain. Patient presents with 2 days of epigastric abdominal pain, sharp, moderate severity, nonradiating, associated with nausea, vomiting, diarrhea. Patient denies headache, vision changes, lightheadedness, neck pain, chest pain, shortness of breath, diaphoresis, palpitations, hematemesis, dysuria, frequency, urgency, vaginal discharge, vaginal bleeding, hematochezia, melena, lower extremity swelling or pain. Patient has history of HTN, HLD, DM, rectal bleeding. Patient denies drug use, alcohol use, history of gastritis, ulcers.   Patient did not take anything for symptoms prior to arrival.    PAST MEDICAL / SURGICAL / SOCIAL / FAMILY HISTORY      has a past medical history of Arthritis, Asthma, Back pain, chronic, Bright red rectal bleeding, CKD (chronic kidney disease) stage 1, GFR 90 ml/min or greater, Colon polyp, COPD (chronic obstructive pulmonary disease) ([facility] [address] CVA (cerebral infarction), Depression, Diabetes mellitus ([facility] [address] Diverticulosis, DVT (deep venous thrombosis) ([facility] [address] Family history of colon cancer, Full dentures, GERD (gastroesophageal reflux disease), HDL lipoprotein deficiency, Hiatal hernia, HTN (hypertension), Hx of blood clots, Hypercalcemia, Hyperlipemia, Hyperparathyroidism ([address] Hypomagnesemia, Hypophosphatemia, Leaky heart valve, Liver fatty degeneration, MDRO (multiple drug resistant organisms) resistance, Pulmonary valve disease, Scoliosis, Sleep apnea, Thyroid disease, Tobacco dependence, and Varicose vein of leg.       has a past surgical history that includes Vena Cava Filter Placement (01/26/2011 of placement ); Hysterectomy (2005); Cholecystectomy; Tubal ligation (2004); Carpal tunnel release (Bilateral); Finger trigger release (Bilateral); Varicose vein surgery; Plantar fascia surgery (Bilateral); Cardiac catheterization; Colonoscopy (2009 or 2010); Thyroidectomy (N/A, 9/29/2017); and Colonoscopy (N/A, 6/10/2019).       Social History     Socioeconomic History    Marital status:      Spouse name: Siobhan Carey Number of children: 3    Years of education: Not on file    Highest education level: Not on file   Occupational History    Not on file   Tobacco Use    Smoking status: Current Every Day Smoker     Packs/day: 0.25     Years: 12.00     Pack years: 3.00     Types: Cigarettes     Start date: 1996    Smokeless tobacco: Never Used   Substance and Sexual Activity    Alcohol use: No    Drug use: No    Sexual activity: Yes     Partners: Male   Other Topics Concern    Not on file   Social History Narrative    Not on file     Social Determinants of Health     Financial Resource Strain: Low Risk     Difficulty of Paying Living Expenses: Not hard at all   Food Insecurity: No Food Insecurity    Worried About [address] in the Last Year: Never true    Ran Out of Food in the Last Year: Never true   Transportation Needs:     Lack of Transportation (Medical): Not on file    Lack of Transportation (Non-Medical):  Not on file   Physical Activity:     Days of Exercise per Week: Not on file    Minutes of Exercise per Session: Not on file   Stress:     Feeling of Stress : Not on file   Social Connections:     Frequency of Communication with Friends and Family: Not on file    Frequency of Social Gatherings with Friends and Family: Not on file    Attends Confucianist Services: Not on file    Active Member of Clubs or Organizations: Not on file    Attends Club or Organization Meetings: Not on file    Marital Status: Not on file   Intimate Partner Violence:     Fear of Current or Ex-Partner: Not on file    Emotionally Abused: Not on file    Physically Abused: Not on file    Sexually Abused: Not on file   Housing Stability:     Unable to Pay for Housing in the Last Year: Not on file    Number of [place] in the Last Year: Not on file    Unstable Housing in the Last Year: Not on file       Family History   Problem Relation Age of Onset    High Blood Pressure Mother     Depression Mother     Colon Cancer Mother          in     Breast Cancer Mother     Thyroid Disease Mother     Heart Disease Father         ruptured aortic aneurysm    High Blood Pressure Father     Diabetes Father     Depression Father     Arthritis Father     Depression Sister     High Blood Pressure Brother     Colon Cancer Maternal Uncle     Other Maternal Grandfather         AAA, aneurysm in brain    Thyroid Disease Maternal Grandmother     High Blood Pressure Maternal Grandmother     Seizures Paternal Grandmother     High Blood Pressure Paternal Grandmother     Stroke Brother     Other Brother         Kyphoplasty       Allergies:  Atorvastatin, Cymbalta [duloxetine hcl], Lipitor, and Tape [adhesive tape]    Home Medications:  Prior to Admission medications    Medication Sig Start Date End Date Taking? Authorizing Provider   ondansetron (ZOFRAN ODT) 4 MG disintegrating tablet Take 1 tablet by mouth every 8 hours as needed for Nausea 5/23/22  Yes Constantin Nichols MD   pantoprazole (PROTONIX) 20 MG tablet Take 2 tablets by mouth daily 5/23/22  Yes Constantin Nichols MD   bismuth subsalicylate (BISMATROL) 262 MG/15ML suspension Take 30 mLs by mouth every 6 hours as needed for Indigestion 5/23/22  Yes Constantin Nichols MD   metFORMIN (GLUCOPHAGE) 1000 MG tablet Take 1 tablet by mouth 2 times daily (with meals) 3/11/22 6/9/22  Saurav Foster MD   levothyroxine (EUTHYROX) 125 MCG tablet Take 1 tablet by mouth 2 times daily 1/28/22   Sravanthi Flowers MD   ipratropium-albuterol (DUONEB) 0.5-2.5 (3) MG/3ML SOLN nebulizer solution Inhale 3 mLs into the lungs every 4 hours as needed for Shortness of Breath 10/21/21   RAFA Best CNP   aspirin 325 MG EC tablet Take 81 mg by mouth     Historical Provider, MD   tiotropium (SPIRIVA HANDIHALER) 18 MCG inhalation capsule Inhale 1 capsule into the lungs daily 10/7/21   RAFA Best CNP   albuterol sulfate HFA (VENTOLIN HFA) 108 (90 Base) MCG/ACT inhaler Inhale 2 puffs into the lungs 4 times daily as needed for Wheezing 10/7/21   RAFA Best CNP   albuterol (PROVENTIL) (2.5 MG/3ML) 0.083% nebulizer solution Take 3 mLs by nebulization every 4 hours as needed for Wheezing 12/10/20   Sudhir Blake MD   Respiratory Therapy Supplies (NEBULIZER AIR TUBE/PLUGS) MISC 1 each by Does not apply route daily 2/21/20   Dell Ward PA-C       REVIEW OF SYSTEMS    (2-9 systems for level 4, 10 or more for level 5)      Review of Systems   Constitutional: Negative for chills, diaphoresis, fatigue and fever. HENT: Negative for congestion, rhinorrhea and sore throat. Eyes: Negative for visual disturbance. Respiratory: Negative for cough and shortness of breath.     Cardiovascular: Negative for chest pain, palpitations and leg swelling. Gastrointestinal: Positive for abdominal pain, diarrhea, nausea and vomiting. Negative for blood in stool and constipation. Genitourinary: Negative for dysuria, frequency, hematuria, urgency, vaginal bleeding and vaginal discharge. Musculoskeletal: Negative for neck pain and neck stiffness. Skin: Negative for pallor and rash. Neurological: Negative for dizziness, syncope, facial asymmetry, speech difficulty, weakness, light-headedness and headaches. Psychiatric/Behavioral: Negative for confusion. PHYSICAL EXAM   (up to 7 for level 4, 8 or more for level 5)      INITIAL VITALS:   /76   Pulse 75   Temp 98.2 °F (36.8 °C) (Oral)   Resp 18   Ht 5' 2\" (1.575 m)   Wt 190 lb (86.2 kg)   SpO2 95%   BMI 34.75 kg/m²     Physical Exam  Constitutional:       General: She is not in acute distress. Appearance: She is well-developed. She is not ill-appearing, toxic-appearing or diaphoretic. Comments: Patient is alert and oriented, openly communicative, no acute distress, nontoxic   HENT:      Head: Normocephalic and atraumatic. Right Ear: External ear normal.      Left Ear: External ear normal.   Eyes:      General:         Right eye: No discharge. Left eye: No discharge. Extraocular Movements: Extraocular movements intact. Pupils: Pupils are equal, round, and reactive to light. Neck:      Vascular: No JVD. Trachea: No tracheal deviation. Cardiovascular:      Rate and Rhythm: Normal rate and regular rhythm. Pulses: Normal pulses. Heart sounds: Normal heart sounds. No murmur heard. No friction rub. No gallop. Pulmonary:      Effort: Pulmonary effort is normal. No respiratory distress. Breath sounds: Normal breath sounds. No stridor. No wheezing, rhonchi or rales. Chest:      Chest wall: No tenderness. Abdominal:      General: There is no distension. Palpations: Abdomen is soft.  There is no mass.      Tenderness: There is abdominal tenderness. There is no right CVA tenderness, left CVA tenderness or guarding. Comments: Abdomen soft, nondistended, tender in the epigastrium, negative Mandel sign, no right upper quadrant tenderness, no other abdominal tenderness, no ecchymosis, no CVA tenderness bilaterally. Musculoskeletal:         General: No tenderness. Normal range of motion. Cervical back: Normal range of motion and neck supple. No rigidity or tenderness. Right lower leg: No edema. Left lower leg: No edema. Skin:     General: Skin is warm. Capillary Refill: Capillary refill takes less than 2 seconds. Neurological:      General: No focal deficit present. Mental Status: She is alert and oriented to person, place, and time. Cranial Nerves: No cranial nerve deficit. Sensory: No sensory deficit. Motor: No weakness.       Coordination: Coordination normal.      Gait: Gait normal.   Psychiatric:         Mood and Affect: Mood normal.         Behavior: Behavior normal.         DIFFERENTIAL  DIAGNOSIS     PLAN (LABS / IMAGING / EKG):  Orders Placed This Encounter   Procedures    XR CHEST (2 VW)    CBC with Auto Differential    Comprehensive Metabolic Panel w/ Reflex to MG    Lipase    Lactic Acid    EKG 12 Lead       MEDICATIONS ORDERED:  Orders Placed This Encounter   Medications    DISCONTD: pantoprazole (PROTONIX) 40 mg in sodium chloride 0.9 % 50 mL bolus    aluminum & magnesium hydroxide-simethicone (MAALOX) 200-200-20 MG/5ML suspension 15 mL    lidocaine viscous hcl (XYLOCAINE) 2 % solution 15 mL    ondansetron (ZOFRAN) injection 4 mg    pantoprazole (PROTONIX) 40 mg in sodium chloride (PF) 10 mL injection    ondansetron (ZOFRAN ODT) 4 MG disintegrating tablet     Sig: Take 1 tablet by mouth every 8 hours as needed for Nausea     Dispense:  10 tablet     Refill:  0    pantoprazole (PROTONIX) 20 MG tablet     Sig: Take 2 tablets by mouth daily Dispense:  30 tablet     Refill:  0    bismuth subsalicylate (BISMATROL) 262 MG/15ML suspension     Sig: Take 30 mLs by mouth every 6 hours as needed for Indigestion     Dispense:  236 mL     Refill:  0       DDX: Pancreatitis, PUD, gastritis, gastroenteritis, ACS    DIAGNOSTIC RESULTS / EMERGENCY DEPARTMENT COURSE / MDM   LAB RESULTS:  Results for orders placed or performed during the hospital encounter of 05/23/22   CBC with Auto Differential   Result Value Ref Range    WBC 7.2 3.5 - 11.3 k/uL    RBC 4.04 3.95 - 5.11 m/uL    Hemoglobin 13.4 11.9 - 15.1 g/dL    Hematocrit 39.1 36.3 - 47.1 %    MCV 96.8 82.6 - 102.9 fL    MCH 33.2 25.2 - 33.5 pg    MCHC 34.3 28.4 - 34.8 g/dL    RDW 11.7 (L) 11.8 - 14.4 %    Platelets 216 756 - 414 k/uL    MPV 10.2 8.1 - 13.5 fL    NRBC Automated 0.0 0.0 per 100 WBC    Seg Neutrophils 61 36 - 65 %    Lymphocytes 28 24 - 43 %    Monocytes 7 3 - 12 %    Eosinophils % 3 1 - 4 %    Basophils 1 0 - 2 %    Immature Granulocytes 0 0 %    Segs Absolute 4.45 1.50 - 8.10 k/uL    Absolute Lymph # 2.00 1. 10 - 3.70 k/uL    Absolute Mono # 0.48 0.10 - 1.20 k/uL    Absolute Eos # 0.19 0.00 - 0.44 k/uL    Basophils Absolute 0.04 0.00 - 0.20 k/uL    Absolute Immature Granulocyte <0.03 0.00 - 0.30 k/uL   Comprehensive Metabolic Panel w/ Reflex to MG   Result Value Ref Range    Glucose 179 (H) 70 - 99 mg/dL    BUN 9 6 - 20 mg/dL    CREATININE 0.53 0.50 - 0.90 mg/dL    Calcium 9.2 8.6 - 10.4 mg/dL    Sodium 135 135 - 144 mmol/L    Potassium 3.7 3.7 - 5.3 mmol/L    Chloride 101 98 - 107 mmol/L    CO2 22 20 - 31 mmol/L    Anion Gap 12 9 - 17 mmol/L    Alkaline Phosphatase 91 35 - 104 U/L    ALT 23 5 - 33 U/L    AST 18 <32 U/L    Total Bilirubin 0.62 0.3 - 1.2 mg/dL    Total Protein 7.0 6.4 - 8.3 g/dL    Albumin 4.3 3.5 - 5.2 g/dL    Albumin/Globulin Ratio 1.6 1.0 - 2.5    GFR Non-African American >60 >60 mL/min    GFR African American >60 >60 mL/min    GFR Comment         Lipase   Result Value Ref Range    Lipase 29 13 - 60 U/L   Troponin   Result Value Ref Range    Troponin, High Sensitivity <6 0 - 14 ng/L   Lactic Acid   Result Value Ref Range    Lactic Acid, Whole Blood 1.2 0.7 - 2.1 mmol/L       IMPRESSION: 67-year-old female with no significant cardiac history, however presenting with epigastric pain, will obtain EKG and troponins to evaluate for ACS. Will obtain abdominal labs to evaluate for anemia, electrolyte abnormality, pancreatitis, liver/gallbladder etiology. Will obtain chest x-ray to evaluate for free air in the abdomen, cardiomegaly, pneumothorax, pneumonia. Will administer GI cocktail, Zofran, Protonix, reevaluate. RADIOLOGY:  XR CHEST (2 VW)    Result Date: 5/23/2022  EXAMINATION: TWO XRAY VIEWS OF THE CHEST 5/23/2022 4:15 am COMPARISON: October 21, 2021 HISTORY: ORDERING SYSTEM PROVIDED HISTORY: Epigastric pain TECHNOLOGIST PROVIDED HISTORY: Epigastric pain FINDINGS: No lines or tubes. Normal cardiomediastinal silhouette. The lungs are clear without focal consolidation or pleural effusion. No suspicious pulmonary nodules. No pulmonary edema. No pneumothorax. No acute osseous abnormality. No acute cardiopulmonary disease. EKG  EKG Interpretation    Interpreted by me    Rhythm: normal sinus   Rate: normal  Axis: normal  Ectopy: none  Conduction: normal  ST Segments: no acute change  T Waves: no acute change  Q Waves: none    Clinical Impression: no acute changes and normal EKG    All EKG's are interpreted by the Emergency Department Physician who either signs or Co-signs this chart in the absence of a cardiologist.    EMERGENCY DEPARTMENT COURSE:  Patient came to emergency department, HPI and physical exam were conducted. All nursing notes were reviewed. EKG found no acute changes, troponin negative, low concern for ACS. Abdominal labs largely unremarkable. On reevaluation, patient has improvement in symptoms. Patient would like to try outpatient management.   Will prescribe Protonix and have patient follow-up with her PCP for reevaluation in the next few days. Patient remained stable in the emergency department with stable vital signs. Gave strict return precautions to the emergency department and discharge patient home. No notes of  Admission Criteria type on file. PROCEDURES:      CONSULTS:  None    CRITICAL CARE:      FINAL IMPRESSION      1.  Abdominal pain, epigastric          DISPOSITION / PLAN     DISPOSITION Decision To Discharge 05/23/2022 05:02:11 AM      PATIENT REFERRED TO:  Lex Cowden, MD  [address] [facility] [place]  [phone]    Schedule an appointment as soon as possible for a visit in 3 days  For reassessment    [facility]  [address]  Go to   As needed, If symptoms worsen      DISCHARGE MEDICATIONS:  Discharge Medication List as of 5/23/2022  5:06 AM      START taking these medications    Details   ondansetron (ZOFRAN ODT) 4 MG disintegrating tablet Take 1 tablet by mouth every 8 hours as needed for Nausea, Disp-10 tablet, R-0Print      pantoprazole (PROTONIX) 20 MG tablet Take 2 tablets by mouth daily, Disp-30 tablet, R-0Print      bismuth subsalicylate (BISMATROL) 262 MG/15ML suspension Take 30 mLs by mouth every 6 hours as needed for Indigestion, Disp-236 mL, R-0Print             Luisito Bermudez MD  Emergency Medicine Resident    (Please note that portions of thisnote were completed with a voice recognition program.  Efforts were made to edit the dictations but occasionally words are mis-transcribed.)        Luisito Bermudez MD  Resident  05/23/22 [id number]

## 2022-05-23 NOTE — ED NOTES
Pt. Presents to the ED for abd pain x2 days. Pt states that she feels as if the pain is traveling up to her chest now and is causing some sob. Pt. Has not had n/v/d. Pt placed on full cardiac monitor. Pt ekg, line, and labs completed. Dr. Hawa Castellanos at the bedside for evaluation. Will continue with plan of care.       Faith Yu RN  05/23/22 [id number]

## 2022-05-23 NOTE — Clinical Note
Venkata Nava was seen and treated in our emergency department on 5/23/2022. She may return to work on 05/25/2022. If you have any questions or concerns, please don't hesitate to call.       Kevin Morel MD

## 2022-05-23 NOTE — ED NOTES
Pt. Discharge instructions reviewed. Pt has no further questions at this time.       Rachel Madrid RN  05/23/22 [id number]

## 2023-06-20 ENCOUNTER — OFFICE VISIT (OUTPATIENT)
Dept: INTERNAL MEDICINE CLINIC | Age: 48
End: 2023-06-20
Payer: COMMERCIAL

## 2023-06-20 VITALS
HEART RATE: 94 BPM | SYSTOLIC BLOOD PRESSURE: 136 MMHG | BODY MASS INDEX: 36.76 KG/M2 | OXYGEN SATURATION: 98 % | DIASTOLIC BLOOD PRESSURE: 70 MMHG | WEIGHT: 201 LBS

## 2023-06-20 DIAGNOSIS — R10.32 LLQ PAIN: ICD-10-CM

## 2023-06-20 DIAGNOSIS — E66.9 DIABETES MELLITUS TYPE 2 IN OBESE (HCC): ICD-10-CM

## 2023-06-20 DIAGNOSIS — E11.69 DIABETES MELLITUS TYPE 2 IN OBESE (HCC): ICD-10-CM

## 2023-06-20 DIAGNOSIS — Z13.220 SCREENING FOR HYPERLIPIDEMIA: ICD-10-CM

## 2023-06-20 DIAGNOSIS — R10.32 LEFT GROIN PAIN: Primary | ICD-10-CM

## 2023-06-20 PROCEDURE — 99214 OFFICE O/P EST MOD 30 MIN: CPT | Performed by: INTERNAL MEDICINE

## 2023-06-20 PROCEDURE — 3075F SYST BP GE 130 - 139MM HG: CPT | Performed by: INTERNAL MEDICINE

## 2023-06-20 PROCEDURE — 3078F DIAST BP <80 MM HG: CPT | Performed by: INTERNAL MEDICINE

## 2023-06-20 SDOH — ECONOMIC STABILITY: HOUSING INSECURITY
IN THE LAST 12 MONTHS, WAS THERE A TIME WHEN YOU DID NOT HAVE A STEADY PLACE TO SLEEP OR SLEPT IN A SHELTER (INCLUDING NOW)?: NO

## 2023-06-20 SDOH — ECONOMIC STABILITY: FOOD INSECURITY: WITHIN THE PAST 12 MONTHS, THE FOOD YOU BOUGHT JUST DIDN'T LAST AND YOU DIDN'T HAVE MONEY TO GET MORE.: NEVER TRUE

## 2023-06-20 SDOH — ECONOMIC STABILITY: INCOME INSECURITY: HOW HARD IS IT FOR YOU TO PAY FOR THE VERY BASICS LIKE FOOD, HOUSING, MEDICAL CARE, AND HEATING?: NOT HARD AT ALL

## 2023-06-20 SDOH — ECONOMIC STABILITY: FOOD INSECURITY: WITHIN THE PAST 12 MONTHS, YOU WORRIED THAT YOUR FOOD WOULD RUN OUT BEFORE YOU GOT MONEY TO BUY MORE.: NEVER TRUE

## 2023-06-20 ASSESSMENT — PATIENT HEALTH QUESTIONNAIRE - PHQ9
5. POOR APPETITE OR OVEREATING: 1
7. TROUBLE CONCENTRATING ON THINGS, SUCH AS READING THE NEWSPAPER OR WATCHING TELEVISION: 0
1. LITTLE INTEREST OR PLEASURE IN DOING THINGS: 1
4. FEELING TIRED OR HAVING LITTLE ENERGY: 3
6. FEELING BAD ABOUT YOURSELF - OR THAT YOU ARE A FAILURE OR HAVE LET YOURSELF OR YOUR FAMILY DOWN: 0
3. TROUBLE FALLING OR STAYING ASLEEP: 3
2. FEELING DOWN, DEPRESSED OR HOPELESS: 0
SUM OF ALL RESPONSES TO PHQ QUESTIONS 1-9: 8
SUM OF ALL RESPONSES TO PHQ QUESTIONS 1-9: 8
10. IF YOU CHECKED OFF ANY PROBLEMS, HOW DIFFICULT HAVE THESE PROBLEMS MADE IT FOR YOU TO DO YOUR WORK, TAKE CARE OF THINGS AT HOME, OR GET ALONG WITH OTHER PEOPLE: 1
SUM OF ALL RESPONSES TO PHQ9 QUESTIONS 1 & 2: 1
SUM OF ALL RESPONSES TO PHQ QUESTIONS 1-9: 8
8. MOVING OR SPEAKING SO SLOWLY THAT OTHER PEOPLE COULD HAVE NOTICED. OR THE OPPOSITE, BEING SO FIGETY OR RESTLESS THAT YOU HAVE BEEN MOVING AROUND A LOT MORE THAN USUAL: 0
SUM OF ALL RESPONSES TO PHQ QUESTIONS 1-9: 8
9. THOUGHTS THAT YOU WOULD BE BETTER OFF DEAD, OR OF HURTING YOURSELF: 0

## 2023-06-20 ASSESSMENT — ENCOUNTER SYMPTOMS
EYES NEGATIVE: 1
GASTROINTESTINAL NEGATIVE: 1
RESPIRATORY NEGATIVE: 1

## 2023-06-20 NOTE — PROGRESS NOTES
[address] [facility] [address]  [place] [address]  Dept: [phone]  Dept Fax: [phone]    Valente Felty is a 50 y.o. female who presents today for her medicalconditions/complaints as noted below. Valente Felty is c/o of Diabetes (Pain in groin for 3 months )      HPI:     Diabetes  She presents for her follow-up diabetic visit. She has type 2 diabetes mellitus. Her disease course has been stable. There are no diabetic complications. Current diabetic treatment includes oral agent (monotherapy). There is no change in her home blood glucose trend. Groin Pain  This is a new problem. The current episode started more than 1 month ago (3 and a half months). The pain is moderate (sharp). The symptoms are aggravated by activity (standing walking or croos left over right leg). She has tried nothing for the symptoms.      Does not feel it is diverticulitis pain    Past Medical History:   Diagnosis Date    Arthritis     OA    Asthma     Back pain, chronic 10/22/2014    Bright red rectal bleeding     CKD (chronic kidney disease) stage 1, GFR 90 ml/min or greater     Colon polyp 06/10/2019    SESSILE SERRATED ADENOMA    COPD (chronic obstructive pulmonary disease) (HCC)     CVA (cerebral infarction) 1997    Rt. side numbness & weakness    Depression     Diabetes mellitus ([address]     currently diet controlled    Diverticulosis     DVT (deep venous thrombosis) ([address]     multiple- in Pregnancy    Family history of colon cancer     Full dentures     Upper only    GERD (gastroesophageal reflux disease)     HDL lipoprotein deficiency     Hiatal hernia     HTN (hypertension)     Hx of blood clots 2011    PE    Hypercalcemia     Hyperlipemia     Hyperparathyroidism ([address]     Hypomagnesemia     Hypophosphatemia     Leaky heart valve     Liver fatty degeneration     MDRO (multiple drug resistant organisms) resistance 1/2015    MRSA from spider bite    Pulmonary valve disease     Scoliosis Hypoglycemia guideline

## 2023-06-27 ENCOUNTER — HOSPITAL ENCOUNTER (OUTPATIENT)
Dept: CT IMAGING | Age: 48
Discharge: HOME | End: 2023-06-29
Attending: INTERNAL MEDICINE
Payer: COMMERCIAL

## 2023-06-27 DIAGNOSIS — R10.32 LEFT GROIN PAIN: ICD-10-CM

## 2023-06-27 DIAGNOSIS — R10.32 LLQ PAIN: ICD-10-CM

## 2023-06-27 PROCEDURE — 74176 CT ABD & PELVIS W/O CONTRAST: CPT

## 2023-06-29 ENCOUNTER — TELEPHONE (OUTPATIENT)
Dept: INTERNAL MEDICINE CLINIC | Age: 48
End: 2023-06-29

## 2023-09-28 ENCOUNTER — HOSPITAL ENCOUNTER (OUTPATIENT)
Age: 48
Discharge: HOME | End: 2023-09-28
Payer: COMMERCIAL

## 2023-09-28 ENCOUNTER — OFFICE VISIT (OUTPATIENT)
Dept: INTERNAL MEDICINE CLINIC | Age: 48
End: 2023-09-28
Payer: COMMERCIAL

## 2023-09-28 VITALS
BODY MASS INDEX: 39.56 KG/M2 | DIASTOLIC BLOOD PRESSURE: 80 MMHG | HEART RATE: 98 BPM | OXYGEN SATURATION: 97 % | HEIGHT: 62 IN | WEIGHT: 215 LBS | SYSTOLIC BLOOD PRESSURE: 134 MMHG

## 2023-09-28 DIAGNOSIS — R10.32 LEFT GROIN PAIN: ICD-10-CM

## 2023-09-28 DIAGNOSIS — J44.1 COPD EXACERBATION (HCC): ICD-10-CM

## 2023-09-28 DIAGNOSIS — E66.9 DIABETES MELLITUS TYPE 2 IN OBESE (HCC): Primary | ICD-10-CM

## 2023-09-28 DIAGNOSIS — E11.9 TYPE 2 DIABETES MELLITUS WITHOUT COMPLICATION, WITHOUT LONG-TERM CURRENT USE OF INSULIN (HCC): ICD-10-CM

## 2023-09-28 DIAGNOSIS — R10.32 LLQ PAIN: ICD-10-CM

## 2023-09-28 DIAGNOSIS — E11.69 DIABETES MELLITUS TYPE 2 IN OBESE (HCC): Primary | ICD-10-CM

## 2023-09-28 DIAGNOSIS — E03.9 HYPOTHYROIDISM, UNSPECIFIED TYPE: ICD-10-CM

## 2023-09-28 DIAGNOSIS — E66.9 DIABETES MELLITUS TYPE 2 IN OBESE (HCC): ICD-10-CM

## 2023-09-28 DIAGNOSIS — Z12.39 ENCOUNTER FOR SCREENING FOR MALIGNANT NEOPLASM OF BREAST, UNSPECIFIED SCREENING MODALITY: ICD-10-CM

## 2023-09-28 DIAGNOSIS — J41.0 SIMPLE CHRONIC BRONCHITIS (HCC): ICD-10-CM

## 2023-09-28 DIAGNOSIS — Z13.220 SCREENING FOR HYPERLIPIDEMIA: ICD-10-CM

## 2023-09-28 DIAGNOSIS — E11.69 DIABETES MELLITUS TYPE 2 IN OBESE (HCC): ICD-10-CM

## 2023-09-28 DIAGNOSIS — M25.551 PAIN OF RIGHT HIP: ICD-10-CM

## 2023-09-28 LAB
ANION GAP SERPL CALCULATED.3IONS-SCNC: 10 MMOL/L (ref 9–17)
BASOPHILS # BLD: 0 K/UL (ref 0–0.2)
BASOPHILS NFR BLD: 1 % (ref 0–2)
BUN SERPL-MCNC: 12 MG/DL (ref 6–20)
CALCIUM SERPL-MCNC: 9 MG/DL (ref 8.6–10.4)
CHLORIDE SERPL-SCNC: 104 MMOL/L (ref 98–107)
CHOLEST SERPL-MCNC: 226 MG/DL
CHOLESTEROL/HDL RATIO: 6.8
CO2 SERPL-SCNC: 23 MMOL/L (ref 20–31)
CREAT SERPL-MCNC: 0.6 MG/DL (ref 0.5–0.9)
CREAT UR-MCNC: 172.5 MG/DL (ref 28–217)
EOSINOPHIL # BLD: 0.1 K/UL (ref 0–0.4)
EOSINOPHILS RELATIVE PERCENT: 2 % (ref 0–4)
ERYTHROCYTE [DISTWIDTH] IN BLOOD BY AUTOMATED COUNT: 13.2 % (ref 11.5–14.9)
GFR SERPL CREATININE-BSD FRML MDRD: >60 ML/MIN/1.73M2
GLUCOSE SERPL-MCNC: 191 MG/DL (ref 70–99)
HBA1C MFR BLD: 6.8 %
HCT VFR BLD AUTO: 42 % (ref 36–46)
HDLC SERPL-MCNC: 33 MG/DL
HGB BLD-MCNC: 14.2 G/DL (ref 12–16)
LDLC SERPL CALC-MCNC: ABNORMAL MG/DL (ref 0–130)
LDLC SERPL DIRECT ASSAY-MCNC: 133 MG/DL
LYMPHOCYTES NFR BLD: 2.5 K/UL (ref 1–4.8)
LYMPHOCYTES RELATIVE PERCENT: 37 % (ref 24–44)
MCH RBC QN AUTO: 32.5 PG (ref 26–34)
MCHC RBC AUTO-ENTMCNC: 33.8 G/DL (ref 31–37)
MCV RBC AUTO: 96.2 FL (ref 80–100)
MICROALBUMIN UR-MCNC: 474 MG/L
MICROALBUMIN/CREAT UR-RTO: 275 MCG/MG CREAT
MONOCYTES NFR BLD: 0.5 K/UL (ref 0.1–1.3)
MONOCYTES NFR BLD: 7 % (ref 1–7)
NEUTROPHILS NFR BLD: 53 % (ref 36–66)
NEUTS SEG NFR BLD: 3.6 K/UL (ref 1.3–9.1)
PLATELET # BLD AUTO: 222 K/UL (ref 150–450)
PMV BLD AUTO: 8.5 FL (ref 6–12)
POTASSIUM SERPL-SCNC: 4.2 MMOL/L (ref 3.7–5.3)
RBC # BLD AUTO: 4.37 M/UL (ref 4–5.2)
SODIUM SERPL-SCNC: 137 MMOL/L (ref 135–144)
TRIGL SERPL-MCNC: 472 MG/DL
WBC OTHER # BLD: 6.7 K/UL (ref 3.5–11)

## 2023-09-28 PROCEDURE — 3044F HG A1C LEVEL LT 7.0%: CPT | Performed by: INTERNAL MEDICINE

## 2023-09-28 PROCEDURE — 3079F DIAST BP 80-89 MM HG: CPT | Performed by: INTERNAL MEDICINE

## 2023-09-28 PROCEDURE — 3075F SYST BP GE 130 - 139MM HG: CPT | Performed by: INTERNAL MEDICINE

## 2023-09-28 PROCEDURE — 36415 COLL VENOUS BLD VENIPUNCTURE: CPT

## 2023-09-28 PROCEDURE — 83721 ASSAY OF BLOOD LIPOPROTEIN: CPT

## 2023-09-28 PROCEDURE — 80061 LIPID PANEL: CPT

## 2023-09-28 PROCEDURE — 99214 OFFICE O/P EST MOD 30 MIN: CPT | Performed by: INTERNAL MEDICINE

## 2023-09-28 PROCEDURE — 85025 COMPLETE CBC W/AUTO DIFF WBC: CPT

## 2023-09-28 PROCEDURE — 82570 ASSAY OF URINE CREATININE: CPT

## 2023-09-28 PROCEDURE — 83036 HEMOGLOBIN GLYCOSYLATED A1C: CPT | Performed by: INTERNAL MEDICINE

## 2023-09-28 PROCEDURE — 82043 UR ALBUMIN QUANTITATIVE: CPT

## 2023-09-28 PROCEDURE — 80048 BASIC METABOLIC PNL TOTAL CA: CPT

## 2023-09-28 RX ORDER — LEVOTHYROXINE SODIUM 0.12 MG/1
125 TABLET ORAL
Qty: 180 TABLET | Refills: 1 | Status: SHIPPED | OUTPATIENT
Start: 2023-09-28

## 2023-09-28 RX ORDER — ALBUTEROL SULFATE 90 UG/1
2 AEROSOL, METERED RESPIRATORY (INHALATION) 4 TIMES DAILY PRN
Qty: 54 G | Refills: 1 | Status: SHIPPED | OUTPATIENT
Start: 2023-09-28

## 2023-09-28 RX ORDER — LIDOCAINE 4 G/G
1 PATCH TOPICAL DAILY
Qty: 30 PATCH | Refills: 0 | Status: SHIPPED | OUTPATIENT
Start: 2023-09-28 | End: 2023-10-28

## 2023-09-28 RX ORDER — ALBUTEROL SULFATE 2.5 MG/3ML
2.5 SOLUTION RESPIRATORY (INHALATION) EVERY 4 HOURS PRN
Qty: 300 ML | Refills: 9 | Status: SHIPPED | OUTPATIENT
Start: 2023-09-28

## 2023-09-28 ASSESSMENT — ENCOUNTER SYMPTOMS
CHOKING: 0
APNEA: 0
DIARRHEA: 0
EYE REDNESS: 0
ABDOMINAL PAIN: 0
SHORTNESS OF BREATH: 1
EYE PAIN: 0
BLOOD IN STOOL: 0
COUGH: 0
CONSTIPATION: 0
EYE ITCHING: 0
COLOR CHANGE: 0
CHEST TIGHTNESS: 0
ABDOMINAL DISTENTION: 0
BACK PAIN: 0
EYE DISCHARGE: 0

## 2023-09-28 ASSESSMENT — PATIENT HEALTH QUESTIONNAIRE - PHQ9
6. FEELING BAD ABOUT YOURSELF - OR THAT YOU ARE A FAILURE OR HAVE LET YOURSELF OR YOUR FAMILY DOWN: 0
SUM OF ALL RESPONSES TO PHQ QUESTIONS 1-9: 2
2. FEELING DOWN, DEPRESSED OR HOPELESS: 1
3. TROUBLE FALLING OR STAYING ASLEEP: 0
5. POOR APPETITE OR OVEREATING: 0
10. IF YOU CHECKED OFF ANY PROBLEMS, HOW DIFFICULT HAVE THESE PROBLEMS MADE IT FOR YOU TO DO YOUR WORK, TAKE CARE OF THINGS AT HOME, OR GET ALONG WITH OTHER PEOPLE: 0
8. MOVING OR SPEAKING SO SLOWLY THAT OTHER PEOPLE COULD HAVE NOTICED. OR THE OPPOSITE, BEING SO FIGETY OR RESTLESS THAT YOU HAVE BEEN MOVING AROUND A LOT MORE THAN USUAL: 0
7. TROUBLE CONCENTRATING ON THINGS, SUCH AS READING THE NEWSPAPER OR WATCHING TELEVISION: 0
SUM OF ALL RESPONSES TO PHQ QUESTIONS 1-9: 2
SUM OF ALL RESPONSES TO PHQ9 QUESTIONS 1 & 2: 2
SUM OF ALL RESPONSES TO PHQ QUESTIONS 1-9: 2
1. LITTLE INTEREST OR PLEASURE IN DOING THINGS: 1
9. THOUGHTS THAT YOU WOULD BE BETTER OFF DEAD, OR OF HURTING YOURSELF: 0
SUM OF ALL RESPONSES TO PHQ QUESTIONS 1-9: 2
4. FEELING TIRED OR HAVING LITTLE ENERGY: 0

## 2023-09-28 NOTE — PROGRESS NOTES
Visit Information    Have you changed or started any medications since your last visit including any over-the-counter medicines, vitamins, or herbal medicines? no   Are you having any side effects from any of your medications? -  no  Have you stopped taking any of your medications? Is so, why? -  no    Have you seen any other physician or provider since your last visit? No  Have you had any other diagnostic tests since your last visit? Yes - Records Obtained  Have you been seen in the emergency room and/or had an admission to a hospital since we last saw you? No  Have you had your routine dental cleaning in the past 6 months? no    Have you activated your [employer] account? If not, what are your barriers?  Yes     Patient Care Team:  Vianney Ryder MD as PCP - General (Internal Medicine)  Vianney Ryder MD as PCP - Empaneled Provider  Lachelle Rooney MD as Surgeon (General Surgery)    Medical History Review  Past Medical, Family, and Social History reviewed and does not contribute to the patient presenting condition    Health Maintenance   Topic Date Due    Hepatitis B vaccine (1 of 3 - 3-dose series) Never done    HIV screen  Never done    Diabetic retinal exam  Never done    Hepatitis C screen  Never done    Pneumococcal 0-64 years Vaccine (2 - PCV) 08/14/2018    Lipids  04/22/2019    Diabetic Alb to Cr ratio (uACR) test  08/20/2020    Diabetic foot exam  09/24/2021    COVID-19 Vaccine (4 - Pfizer series) 01/12/2022    A1C test (Diabetic or Prediabetic)  03/16/2023    Breast cancer screen  05/19/2023    GFR test (Diabetes, CKD 3-4, OR last GFR 15-59)  05/23/2023    Shingles vaccine (1 of 2) Never done    Flu vaccine (1) 08/01/2023    Depression Monitoring  06/20/2024    Colorectal Cancer Screen  06/10/2029    DTaP/Tdap/Td vaccine (2 - Td or Tdap) 06/03/2030    Hepatitis A vaccine  Aged Out    Hib vaccine  Aged Out    Meningococcal (ACWY) vaccine  Aged Out

## 2023-09-28 NOTE — PROGRESS NOTES
Subjective:      Patient ID: Uyen Butler is a 46 y.o. female. HPI-patient is here for evaluation of multiple medical problems, she has COPD, diabetes, hypothyroidism, GERD, morbid obesity she is requesting refill of her medication  She has quit smoking, and she has gained substantial amount of weight  She only take rescue inhaler, as per patient she is requiring rescue inhaler 1-2 times in 2 weeks, she is not taking any of her medication for last 1 month  She is requesting a letter stating that she cannot work in close places because she feels that her asthma started acting up in close places   Did not see eye doctor in a long time      Review of Systems   Constitutional:  Positive for unexpected weight change (weight gain). Negative for activity change, appetite change, chills, diaphoresis, fatigue and fever. HENT:  Negative for congestion, dental problem, drooling and ear discharge. Eyes:  Negative for pain, discharge, redness and itching. Respiratory:  Positive for shortness of breath. Negative for apnea, cough, choking and chest tightness. Cardiovascular:  Negative for chest pain and leg swelling. Gastrointestinal:  Negative for abdominal distention, abdominal pain, blood in stool, constipation and diarrhea. Endocrine: Negative for cold intolerance and heat intolerance. Genitourinary:  Negative for difficulty urinating, dysuria, enuresis, flank pain and frequency. Musculoskeletal:  Positive for arthralgias (rt hip[). Negative for back pain, gait problem and joint swelling. Skin:  Negative for color change, pallor and rash. Neurological:  Negative for dizziness, facial asymmetry, light-headedness, numbness and headaches. Psychiatric/Behavioral:  Positive for dysphoric mood. Negative for agitation, behavioral problems, confusion and decreased concentration. Objective:   Physical Exam  Constitutional:       Appearance: She is well-developed. She is obese.  She is not

## 2023-10-03 ENCOUNTER — TELEPHONE (OUTPATIENT)
Dept: INTERNAL MEDICINE CLINIC | Age: 48
End: 2023-10-03

## 2023-10-03 DIAGNOSIS — E11.9 TYPE 2 DIABETES MELLITUS WITHOUT COMPLICATION, WITHOUT LONG-TERM CURRENT USE OF INSULIN (HCC): Primary | ICD-10-CM

## 2023-10-03 DIAGNOSIS — E03.9 HYPOTHYROIDISM, UNSPECIFIED TYPE: ICD-10-CM

## 2023-10-03 DIAGNOSIS — E78.5 HYPERLIPIDEMIA, UNSPECIFIED HYPERLIPIDEMIA TYPE: ICD-10-CM

## 2023-10-03 DIAGNOSIS — R80.9 PROTEINURIA, UNSPECIFIED TYPE: ICD-10-CM

## 2023-10-03 NOTE — TELEPHONE ENCOUNTER
Your cholesterol is high, should be on statin  You you have some proteins in the urine, which is not uncommon in diabetics, should be on lisinopril  If patient agree, will send crestor and low-dose lisinopril to her pharmacy  I realize she has some allergies with Lipitor, can try with Crestor

## 2023-10-03 NOTE — TELEPHONE ENCOUNTER
Patient is agreeable to trying both medications. Please send medication to [facility]. Patient would also like her TSH checked because she said she has had problems with her thyroid all of her life.

## 2023-10-03 NOTE — TELEPHONE ENCOUNTER
----- Message from Angelina Mcnair sent at 10/3/2023  3:08 PM EDT -----  Subject: Results Request    QUESTIONS  Results: Jame Interiano; Ordered by: Vickie Varghese   Date Performed: 2023-09-28  ---------------------------------------------------------------------------  --------------  Devaughn Haq INFO    [phone]; OK to leave message on voicemail  ---------------------------------------------------------------------------  --------------

## 2023-10-04 RX ORDER — ROSUVASTATIN CALCIUM 10 MG/1
10 TABLET, COATED ORAL NIGHTLY
Qty: 30 TABLET | Refills: 3 | Status: SHIPPED | OUTPATIENT
Start: 2023-10-04

## 2023-10-04 RX ORDER — LISINOPRIL 5 MG/1
5 TABLET ORAL DAILY
Qty: 90 TABLET | Refills: 1 | Status: SHIPPED | OUTPATIENT
Start: 2023-10-04

## 2023-12-07 ENCOUNTER — OFFICE VISIT (OUTPATIENT)
Dept: INTERNAL MEDICINE CLINIC | Age: 48
End: 2023-12-07
Payer: COMMERCIAL

## 2023-12-07 ENCOUNTER — HOSPITAL ENCOUNTER (OUTPATIENT)
Age: 48
Setting detail: SPECIMEN
Discharge: HOME | End: 2023-12-07

## 2023-12-07 VITALS
WEIGHT: 214.4 LBS | HEART RATE: 86 BPM | DIASTOLIC BLOOD PRESSURE: 70 MMHG | BODY MASS INDEX: 39.45 KG/M2 | HEIGHT: 62 IN | SYSTOLIC BLOOD PRESSURE: 122 MMHG | OXYGEN SATURATION: 98 %

## 2023-12-07 DIAGNOSIS — E78.5 HYPERLIPIDEMIA, UNSPECIFIED HYPERLIPIDEMIA TYPE: ICD-10-CM

## 2023-12-07 DIAGNOSIS — Z23 NEEDS FLU SHOT: ICD-10-CM

## 2023-12-07 DIAGNOSIS — E03.9 HYPOTHYROIDISM, UNSPECIFIED TYPE: ICD-10-CM

## 2023-12-07 DIAGNOSIS — J41.0 SIMPLE CHRONIC BRONCHITIS (HCC): Primary | ICD-10-CM

## 2023-12-07 DIAGNOSIS — E11.9 TYPE 2 DIABETES MELLITUS WITHOUT COMPLICATION, WITHOUT LONG-TERM CURRENT USE OF INSULIN (HCC): ICD-10-CM

## 2023-12-07 PROCEDURE — 3044F HG A1C LEVEL LT 7.0%: CPT | Performed by: INTERNAL MEDICINE

## 2023-12-07 PROCEDURE — 99214 OFFICE O/P EST MOD 30 MIN: CPT | Performed by: INTERNAL MEDICINE

## 2023-12-07 PROCEDURE — 90471 IMMUNIZATION ADMIN: CPT | Performed by: INTERNAL MEDICINE

## 2023-12-07 PROCEDURE — 3078F DIAST BP <80 MM HG: CPT | Performed by: INTERNAL MEDICINE

## 2023-12-07 PROCEDURE — 3074F SYST BP LT 130 MM HG: CPT | Performed by: INTERNAL MEDICINE

## 2023-12-07 PROCEDURE — 90674 CCIIV4 VAC NO PRSV 0.5 ML IM: CPT | Performed by: INTERNAL MEDICINE

## 2023-12-07 ASSESSMENT — ENCOUNTER SYMPTOMS
EYE PAIN: 0
CONSTIPATION: 0
BACK PAIN: 0
SHORTNESS OF BREATH: 1
APNEA: 0
DIARRHEA: 0
CHEST TIGHTNESS: 0
CHOKING: 0
BLOOD IN STOOL: 0
COUGH: 0
EYE DISCHARGE: 0
EYE REDNESS: 0
EYE ITCHING: 0
ABDOMINAL DISTENTION: 0
ABDOMINAL PAIN: 0
COLOR CHANGE: 0

## 2023-12-07 NOTE — PROGRESS NOTES
Subjective:      Patient ID: Frankey Sitter is a 46 y.o. female. HPI  patient is here for evaluation of multiple medical problems, she has COPD, diabetes, hypothyroidism, GERD, morbid obesity   She has quit smoking, and she has gained substantial amount of weight  She only take rescue inhaler, as per patient she is requiring rescue inhaler 1-2 times in 2 weeks, she is not taking any of her medication for last 1 month  Was in ED with COVID 19 infection   Had CXR - which was negative   Complaint with medication   Patient is off for 2 weeks, was in ED on 11/27, will start tomarrow   Feeling better, has dry cough     Review of Systems   Constitutional:  Positive for unexpected weight change (weight gain). Negative for activity change, appetite change, chills, diaphoresis, fatigue and fever. HENT:  Negative for congestion, dental problem, drooling and ear discharge. Eyes:  Negative for pain, discharge, redness and itching. Respiratory:  Positive for shortness of breath. Negative for apnea, cough, choking and chest tightness. Cardiovascular:  Negative for chest pain and leg swelling. Gastrointestinal:  Negative for abdominal distention, abdominal pain, blood in stool, constipation and diarrhea. Endocrine: Negative for cold intolerance and heat intolerance. Genitourinary:  Negative for difficulty urinating, dysuria, enuresis, flank pain and frequency. Musculoskeletal:  Positive for arthralgias (rt hip[). Negative for back pain, gait problem and joint swelling. Skin:  Negative for color change, pallor and rash. Neurological:  Negative for dizziness, facial asymmetry, light-headedness, numbness and headaches. Psychiatric/Behavioral:  Positive for dysphoric mood. Negative for agitation, behavioral problems, confusion and decreased concentration. Objective:   Physical Exam  Constitutional:       Appearance: She is well-developed. She is obese. She is not diaphoretic.    HENT:      Head:

## 2023-12-08 LAB
T4 FREE SERPL-MCNC: 1.4 NG/DL (ref 0.9–1.7)
TSH SERPL DL<=0.05 MIU/L-ACNC: 5.55 UIU/ML (ref 0.3–5)

## 2023-12-11 ENCOUNTER — TELEPHONE (OUTPATIENT)
Dept: INTERNAL MEDICINE CLINIC | Age: 48
End: 2023-12-11

## 2023-12-11 DIAGNOSIS — E03.9 HYPOTHYROIDISM, UNSPECIFIED TYPE: Primary | ICD-10-CM

## 2023-12-11 RX ORDER — LEVOTHYROXINE SODIUM 0.15 MG/1
150 TABLET ORAL DAILY
Qty: 30 TABLET | Refills: 5 | Status: SHIPPED | OUTPATIENT
Start: 2023-12-11

## 2023-12-11 NOTE — TELEPHONE ENCOUNTER
TSH is much improved to 5.5 down from 12.4, ideally should be between 3 and 5, will increase Synthroid to 150 and repeat TSH in 8 weeks

## 2024-03-23 DIAGNOSIS — E11.9 TYPE 2 DIABETES MELLITUS WITHOUT COMPLICATION, WITHOUT LONG-TERM CURRENT USE OF INSULIN (HCC): ICD-10-CM

## 2024-03-23 DIAGNOSIS — R80.9 PROTEINURIA, UNSPECIFIED TYPE: ICD-10-CM

## 2024-03-25 RX ORDER — LISINOPRIL 5 MG/1
5 TABLET ORAL DAILY
Qty: 90 TABLET | Refills: 0 | Status: SHIPPED | OUTPATIENT
Start: 2024-03-25

## 2024-03-29 DIAGNOSIS — E11.9 TYPE 2 DIABETES MELLITUS WITHOUT COMPLICATION, WITHOUT LONG-TERM CURRENT USE OF INSULIN (HCC): ICD-10-CM

## 2024-03-29 DIAGNOSIS — R80.9 PROTEINURIA, UNSPECIFIED TYPE: ICD-10-CM

## 2024-03-29 RX ORDER — LISINOPRIL 5 MG/1
5 TABLET ORAL DAILY
Qty: 90 TABLET | Refills: 0 | OUTPATIENT
Start: 2024-03-29

## 2024-04-20 DIAGNOSIS — E11.9 TYPE 2 DIABETES MELLITUS WITHOUT COMPLICATION, WITHOUT LONG-TERM CURRENT USE OF INSULIN (HCC): ICD-10-CM

## 2024-04-20 DIAGNOSIS — E78.5 HYPERLIPIDEMIA, UNSPECIFIED HYPERLIPIDEMIA TYPE: ICD-10-CM

## 2024-04-22 RX ORDER — ROSUVASTATIN CALCIUM 10 MG/1
10 TABLET, COATED ORAL NIGHTLY
Qty: 30 TABLET | Refills: 0 | Status: SHIPPED | OUTPATIENT
Start: 2024-04-22

## 2024-05-23 DIAGNOSIS — E78.5 HYPERLIPIDEMIA, UNSPECIFIED HYPERLIPIDEMIA TYPE: ICD-10-CM

## 2024-05-23 DIAGNOSIS — E11.9 TYPE 2 DIABETES MELLITUS WITHOUT COMPLICATION, WITHOUT LONG-TERM CURRENT USE OF INSULIN (HCC): ICD-10-CM

## 2024-05-23 RX ORDER — ROSUVASTATIN CALCIUM 10 MG/1
10 TABLET, COATED ORAL NIGHTLY
Qty: 30 TABLET | Refills: 0 | Status: SHIPPED | OUTPATIENT
Start: 2024-05-23

## 2024-06-19 DIAGNOSIS — E11.9 TYPE 2 DIABETES MELLITUS WITHOUT COMPLICATION, WITHOUT LONG-TERM CURRENT USE OF INSULIN (HCC): ICD-10-CM

## 2024-06-19 DIAGNOSIS — R80.9 PROTEINURIA, UNSPECIFIED TYPE: ICD-10-CM

## 2024-06-19 RX ORDER — LISINOPRIL 5 MG/1
5 TABLET ORAL DAILY
Qty: 90 TABLET | Refills: 0 | Status: SHIPPED | OUTPATIENT
Start: 2024-06-19

## 2024-09-17 DIAGNOSIS — E11.9 TYPE 2 DIABETES MELLITUS WITHOUT COMPLICATION, WITHOUT LONG-TERM CURRENT USE OF INSULIN (HCC): ICD-10-CM

## 2024-09-17 DIAGNOSIS — R80.9 PROTEINURIA, UNSPECIFIED TYPE: ICD-10-CM

## 2024-09-17 RX ORDER — LISINOPRIL 5 MG/1
5 TABLET ORAL DAILY
Qty: 90 TABLET | Refills: 0 | Status: SHIPPED | OUTPATIENT
Start: 2024-09-17

## 2024-09-30 DIAGNOSIS — J44.1 COPD EXACERBATION (HCC): ICD-10-CM

## 2024-10-01 RX ORDER — ALBUTEROL SULFATE 90 UG/1
INHALANT RESPIRATORY (INHALATION)
Qty: 9 G | Refills: 0 | Status: SHIPPED | OUTPATIENT
Start: 2024-10-01

## 2024-10-24 DIAGNOSIS — J44.1 COPD EXACERBATION (HCC): ICD-10-CM

## 2024-10-24 RX ORDER — ALBUTEROL SULFATE 90 UG/1
INHALANT RESPIRATORY (INHALATION)
Qty: 9 G | Refills: 0 | Status: SHIPPED | OUTPATIENT
Start: 2024-10-24

## 2024-11-18 ENCOUNTER — TELEPHONE (OUTPATIENT)
Dept: INTERNAL MEDICINE CLINIC | Age: 49
End: 2024-11-18

## 2024-11-18 NOTE — TELEPHONE ENCOUNTER
Patient contacted office in regards to informing recent high blood sugar readings, yesterdays at a high of 562 stating that she almost went to the ER and now today's reading is 267.     Scheduled patient at the soonest for 11/20/2024 and advised to report to ED with that high blood sugar reading

## 2024-11-20 ENCOUNTER — OFFICE VISIT (OUTPATIENT)
Dept: INTERNAL MEDICINE CLINIC | Age: 49
End: 2024-11-20

## 2024-11-20 VITALS
OXYGEN SATURATION: 98 % | SYSTOLIC BLOOD PRESSURE: 130 MMHG | DIASTOLIC BLOOD PRESSURE: 86 MMHG | WEIGHT: 213 LBS | BODY MASS INDEX: 39.2 KG/M2 | HEART RATE: 77 BPM | HEIGHT: 62 IN

## 2024-11-20 DIAGNOSIS — R80.9 PROTEINURIA, UNSPECIFIED TYPE: ICD-10-CM

## 2024-11-20 DIAGNOSIS — Z12.31 ENCOUNTER FOR SCREENING MAMMOGRAM FOR BREAST CANCER: Primary | ICD-10-CM

## 2024-11-20 DIAGNOSIS — E03.9 HYPOTHYROIDISM, UNSPECIFIED TYPE: ICD-10-CM

## 2024-11-20 DIAGNOSIS — Z13.220 SCREENING FOR HYPERLIPIDEMIA: ICD-10-CM

## 2024-11-20 DIAGNOSIS — E11.9 TYPE 2 DIABETES MELLITUS WITHOUT COMPLICATION, WITHOUT LONG-TERM CURRENT USE OF INSULIN (HCC): ICD-10-CM

## 2024-11-20 DIAGNOSIS — J44.1 COPD EXACERBATION (HCC): ICD-10-CM

## 2024-11-20 DIAGNOSIS — E78.5 HYPERLIPIDEMIA, UNSPECIFIED HYPERLIPIDEMIA TYPE: ICD-10-CM

## 2024-11-20 LAB — HBA1C MFR BLD: 9.7 %

## 2024-11-20 RX ORDER — LISINOPRIL 5 MG/1
5 TABLET ORAL DAILY
Qty: 90 TABLET | Refills: 3 | Status: SHIPPED | OUTPATIENT
Start: 2024-11-20

## 2024-11-20 RX ORDER — ALBUTEROL SULFATE 90 UG/1
2 INHALANT RESPIRATORY (INHALATION) EVERY 4 HOURS PRN
Qty: 9 G | Refills: 0 | Status: SHIPPED | OUTPATIENT
Start: 2024-11-20

## 2024-11-20 RX ORDER — LEVOTHYROXINE SODIUM 150 UG/1
150 TABLET ORAL DAILY
Qty: 90 TABLET | Refills: 3 | Status: SHIPPED | OUTPATIENT
Start: 2024-11-20

## 2024-11-20 RX ORDER — METHOCARBAMOL 500 MG/1
500 TABLET, FILM COATED ORAL 2 TIMES DAILY
COMMUNITY
Start: 2024-05-05

## 2024-11-20 RX ORDER — ROSUVASTATIN CALCIUM 10 MG/1
10 TABLET, COATED ORAL NIGHTLY
Qty: 90 TABLET | Refills: 0 | Status: SHIPPED | OUTPATIENT
Start: 2024-11-20

## 2024-11-20 RX ORDER — DULAGLUTIDE 0.75 MG/.5ML
0.75 INJECTION, SOLUTION SUBCUTANEOUS WEEKLY
Qty: 2 ML | Refills: 0 | Status: SHIPPED | OUTPATIENT
Start: 2024-11-20 | End: 2024-12-20

## 2024-11-20 SDOH — ECONOMIC STABILITY: INCOME INSECURITY: HOW HARD IS IT FOR YOU TO PAY FOR THE VERY BASICS LIKE FOOD, HOUSING, MEDICAL CARE, AND HEATING?: NOT HARD AT ALL

## 2024-11-20 SDOH — ECONOMIC STABILITY: FOOD INSECURITY: WITHIN THE PAST 12 MONTHS, YOU WORRIED THAT YOUR FOOD WOULD RUN OUT BEFORE YOU GOT MONEY TO BUY MORE.: NEVER TRUE

## 2024-11-20 SDOH — ECONOMIC STABILITY: FOOD INSECURITY: WITHIN THE PAST 12 MONTHS, THE FOOD YOU BOUGHT JUST DIDN'T LAST AND YOU DIDN'T HAVE MONEY TO GET MORE.: NEVER TRUE

## 2024-11-20 ASSESSMENT — PATIENT HEALTH QUESTIONNAIRE - PHQ9
10. IF YOU CHECKED OFF ANY PROBLEMS, HOW DIFFICULT HAVE THESE PROBLEMS MADE IT FOR YOU TO DO YOUR WORK, TAKE CARE OF THINGS AT HOME, OR GET ALONG WITH OTHER PEOPLE: NOT DIFFICULT AT ALL
1. LITTLE INTEREST OR PLEASURE IN DOING THINGS: SEVERAL DAYS
6. FEELING BAD ABOUT YOURSELF - OR THAT YOU ARE A FAILURE OR HAVE LET YOURSELF OR YOUR FAMILY DOWN: NOT AT ALL
4. FEELING TIRED OR HAVING LITTLE ENERGY: NOT AT ALL
8. MOVING OR SPEAKING SO SLOWLY THAT OTHER PEOPLE COULD HAVE NOTICED. OR THE OPPOSITE, BEING SO FIGETY OR RESTLESS THAT YOU HAVE BEEN MOVING AROUND A LOT MORE THAN USUAL: NOT AT ALL
SUM OF ALL RESPONSES TO PHQ9 QUESTIONS 1 & 2: 2
SUM OF ALL RESPONSES TO PHQ QUESTIONS 1-9: 3
3. TROUBLE FALLING OR STAYING ASLEEP: SEVERAL DAYS
SUM OF ALL RESPONSES TO PHQ QUESTIONS 1-9: 3
9. THOUGHTS THAT YOU WOULD BE BETTER OFF DEAD, OR OF HURTING YOURSELF: NOT AT ALL
5. POOR APPETITE OR OVEREATING: NOT AT ALL
7. TROUBLE CONCENTRATING ON THINGS, SUCH AS READING THE NEWSPAPER OR WATCHING TELEVISION: NOT AT ALL
SUM OF ALL RESPONSES TO PHQ QUESTIONS 1-9: 3
2. FEELING DOWN, DEPRESSED OR HOPELESS: SEVERAL DAYS
SUM OF ALL RESPONSES TO PHQ QUESTIONS 1-9: 3

## 2024-11-20 ASSESSMENT — ENCOUNTER SYMPTOMS
EYE DISCHARGE: 0
BLOOD IN STOOL: 0
APNEA: 0
COUGH: 0
SHORTNESS OF BREATH: 1
EYE PAIN: 0
EYE ITCHING: 0
CONSTIPATION: 0
EYE REDNESS: 0
BACK PAIN: 0
COLOR CHANGE: 0
ABDOMINAL DISTENTION: 0
CHEST TIGHTNESS: 0
DIARRHEA: 0
ABDOMINAL PAIN: 0
CHOKING: 0

## 2024-11-20 NOTE — PROGRESS NOTES
[facility]  [address] [address]  Dept: [phone]  Dept Fax: [phone]    Office Progress/Follow Up Note  Date of patient's visit: 11/20/2024  Patient's Name:  Rosemary Zelaya YOB: 1975            Patient Care Team:  Lio Porter MD as PCP - General (Internal Medicine)  Lio Porter MD as PCP - Empaneled Provider  Gaby Banks MD as Surgeon (General Surgery)    REASON FOR VISIT: Routine outpatient follow up/Same day visit/Post hospital/ED visit    HISTORY OF PRESENT ILLNESS:      Chief Complaint   Patient presents with    Diabetes     Running A1C        History was obtained from the patient. Rosemary Zelaya is a 49 y.o. is here for a   patient is here for evaluation of multiple medical problems, she has COPD, diabetes, hypothyroidism, GERD, morbid obesity   She has quit smoking, and she has gained substantial amount of weight  She only take rescue inhaler, as per patient she is requiring rescue inhaler 1-2  Patient, checks blood sugars, noticed, blood sugar are running high  Was not taking metformin   Some of readings are > 500   Not seen ophthalmologist in last 1 year       Health Maintenance Due   Topic Date Due    HIV screen  Never done    Diabetic retinal exam  Never done    Hepatitis C screen  Never done    Hepatitis B vaccine (1 of 3 - 19+ 3-dose series) Never done    Pneumococcal 0-64 years Vaccine (2 of 2 - PCV) 08/14/2018    Diabetic foot exam  09/24/2021    Breast cancer screen  05/19/2023    Shingles vaccine (1 of 2) Never done    Flu vaccine (1) 08/01/2024    COVID-19 Vaccine (4 - 2023-24 season) 09/01/2024    A1C test (Diabetic or Prediabetic)  09/28/2024    Diabetic Alb to Cr ratio (uACR) test  09/28/2024    Lipids  09/28/2024    Depression Monitoring  09/28/2024    GFR test (Diabetes, CKD 3-4, OR last GFR 15-59)  09/28/2024       Allergies   Allergen Reactions    Atorvastatin Hives    Cymbalta [Duloxetine Hcl] Hives

## 2024-11-21 ENCOUNTER — TELEPHONE (OUTPATIENT)
Dept: INTERNAL MEDICINE CLINIC | Age: 49
End: 2024-11-21

## 2024-12-11 DIAGNOSIS — E11.9 TYPE 2 DIABETES MELLITUS WITHOUT COMPLICATION, WITHOUT LONG-TERM CURRENT USE OF INSULIN (HCC): ICD-10-CM

## 2024-12-12 RX ORDER — DULAGLUTIDE 0.75 MG/.5ML
0.75 INJECTION, SOLUTION SUBCUTANEOUS WEEKLY
Qty: 2 ML | Refills: 0 | Status: SHIPPED | OUTPATIENT
Start: 2024-12-12 | End: 2025-01-02 | Stop reason: ALTCHOICE

## 2025-01-02 ENCOUNTER — OFFICE VISIT (OUTPATIENT)
Dept: INTERNAL MEDICINE CLINIC | Age: 50
End: 2025-01-02
Payer: COMMERCIAL

## 2025-01-02 ENCOUNTER — HOSPITAL ENCOUNTER (OUTPATIENT)
Dept: GENERAL RADIOLOGY | Age: 50
Discharge: HOME | End: 2025-01-04
Payer: COMMERCIAL

## 2025-01-02 ENCOUNTER — HOSPITAL ENCOUNTER (OUTPATIENT)
Age: 50
Discharge: HOME | End: 2025-01-04
Payer: COMMERCIAL

## 2025-01-02 VITALS
HEART RATE: 82 BPM | WEIGHT: 208 LBS | SYSTOLIC BLOOD PRESSURE: 128 MMHG | OXYGEN SATURATION: 96 % | HEIGHT: 62 IN | BODY MASS INDEX: 38.28 KG/M2 | DIASTOLIC BLOOD PRESSURE: 86 MMHG

## 2025-01-02 DIAGNOSIS — M79.605 PAIN OF LEFT LOWER EXTREMITY: ICD-10-CM

## 2025-01-02 DIAGNOSIS — R09.89 DECREASED PEDAL PULSES: ICD-10-CM

## 2025-01-02 DIAGNOSIS — E66.01 MORBID (SEVERE) OBESITY DUE TO EXCESS CALORIES: ICD-10-CM

## 2025-01-02 DIAGNOSIS — E11.9 TYPE 2 DIABETES MELLITUS WITHOUT COMPLICATION, WITHOUT LONG-TERM CURRENT USE OF INSULIN (HCC): Primary | ICD-10-CM

## 2025-01-02 PROCEDURE — 73502 X-RAY EXAM HIP UNI 2-3 VIEWS: CPT

## 2025-01-02 PROCEDURE — 3074F SYST BP LT 130 MM HG: CPT

## 2025-01-02 PROCEDURE — 72100 X-RAY EXAM L-S SPINE 2/3 VWS: CPT

## 2025-01-02 PROCEDURE — 3079F DIAST BP 80-89 MM HG: CPT

## 2025-01-02 PROCEDURE — 99214 OFFICE O/P EST MOD 30 MIN: CPT

## 2025-01-02 RX ORDER — DULAGLUTIDE 1.5 MG/.5ML
1.5 INJECTION, SOLUTION SUBCUTANEOUS WEEKLY
Qty: 2 ML | Refills: 1 | Status: SHIPPED | OUTPATIENT
Start: 2025-01-02 | End: 2025-03-03

## 2025-01-02 ASSESSMENT — PATIENT HEALTH QUESTIONNAIRE - PHQ9
SUM OF ALL RESPONSES TO PHQ QUESTIONS 1-9: 8
SUM OF ALL RESPONSES TO PHQ9 QUESTIONS 1 & 2: 1
SUM OF ALL RESPONSES TO PHQ QUESTIONS 1-9: 8
7. TROUBLE CONCENTRATING ON THINGS, SUCH AS READING THE NEWSPAPER OR WATCHING TELEVISION: NOT AT ALL
4. FEELING TIRED OR HAVING LITTLE ENERGY: NEARLY EVERY DAY
1. LITTLE INTEREST OR PLEASURE IN DOING THINGS: NOT AT ALL
3. TROUBLE FALLING OR STAYING ASLEEP: NEARLY EVERY DAY
SUM OF ALL RESPONSES TO PHQ QUESTIONS 1-9: 8
SUM OF ALL RESPONSES TO PHQ QUESTIONS 1-9: 8
5. POOR APPETITE OR OVEREATING: NOT AT ALL
8. MOVING OR SPEAKING SO SLOWLY THAT OTHER PEOPLE COULD HAVE NOTICED. OR THE OPPOSITE, BEING SO FIGETY OR RESTLESS THAT YOU HAVE BEEN MOVING AROUND A LOT MORE THAN USUAL: NOT AT ALL
9. THOUGHTS THAT YOU WOULD BE BETTER OFF DEAD, OR OF HURTING YOURSELF: NOT AT ALL
10. IF YOU CHECKED OFF ANY PROBLEMS, HOW DIFFICULT HAVE THESE PROBLEMS MADE IT FOR YOU TO DO YOUR WORK, TAKE CARE OF THINGS AT HOME, OR GET ALONG WITH OTHER PEOPLE: NOT DIFFICULT AT ALL
6. FEELING BAD ABOUT YOURSELF - OR THAT YOU ARE A FAILURE OR HAVE LET YOURSELF OR YOUR FAMILY DOWN: SEVERAL DAYS
2. FEELING DOWN, DEPRESSED OR HOPELESS: SEVERAL DAYS

## 2025-01-02 NOTE — PROGRESS NOTES
\"Have you been to the ER, urgent care clinic since your last visit?  Hospitalized since your last visit?\"    NO    “Have you seen or consulted any other health care providers outside our system since your last visit?”    NO    Have you had a mammogram?”   NO    Date of last Mammogram: 5/19/2022       “Have you had a diabetic eye exam?”    NO     No diabetic eye exam on file            
pulses  - Vascular ankle brachial index (ZAKI); Future  - Rodolfo Palomino MD, Vascular Surgery, [place]    4. Morbid (severe) obesity due to excess calories (E66.01)  Advised wt loss    5. Body mass index [BMI] 38.0-38.9, adult (Z68.38)  Advised wt loss  Calorie restricted diet  _____________________________________________________  Follow up and instructions:    No follow-ups on file.  Rosemary received counseling on the following healthy behaviors: nutrition, exercise, and medication adherence  Discussed use, benefit, and side effects of prescribed medications.  Barriers to medication compliance addressed.  All patient questions answered.  Pt voiced understanding.   Patient given educational materials - see patient instructions    Brenton Turcios MD   Internal Medicine  [facility]  1/2/2025, 2:06 PM    This note is created with the assistance of a speech-recognition program. While intending to generate a document that actually reflects the content of the visit, the document can still have some mistakes which may not have been identified and corrected by editing.

## 2025-01-03 ENCOUNTER — HOSPITAL ENCOUNTER (OUTPATIENT)
Dept: VASCULAR LAB | Age: 50
Discharge: HOME | End: 2025-01-03
Payer: COMMERCIAL

## 2025-01-03 DIAGNOSIS — R09.89 DECREASED PEDAL PULSES: ICD-10-CM

## 2025-01-03 LAB
VAS LEFT ABI: 1.16
VAS LEFT ARM BP: 112 MMHG
VAS LEFT DORSALIS PEDIS BP: 140 MMHG
VAS LEFT PTA BP: 139 MMHG
VAS LEFT TBI: 0.69
VAS LEFT TOE PRESSURE: 83 MMHG
VAS RIGHT ABI: 1.14
VAS RIGHT ARM BP: 121 MMHG
VAS RIGHT DORSALIS PEDIS BP: 138 MMHG
VAS RIGHT PTA BP: 130 MMHG
VAS RIGHT TBI: 0.79
VAS RIGHT TOE PRESSURE: 95 MMHG

## 2025-01-03 PROCEDURE — 93922 UPR/L XTREMITY ART 2 LEVELS: CPT

## 2025-01-03 PROCEDURE — 93922 UPR/L XTREMITY ART 2 LEVELS: CPT | Performed by: SURGERY

## 2025-01-10 ENCOUNTER — INITIAL CONSULT (OUTPATIENT)
Dept: VASCULAR SURGERY | Age: 50
End: 2025-01-10
Payer: COMMERCIAL

## 2025-01-10 VITALS
BODY MASS INDEX: 38.28 KG/M2 | DIASTOLIC BLOOD PRESSURE: 93 MMHG | RESPIRATION RATE: 16 BRPM | OXYGEN SATURATION: 98 % | HEART RATE: 80 BPM | WEIGHT: 208 LBS | SYSTOLIC BLOOD PRESSURE: 149 MMHG | HEIGHT: 62 IN

## 2025-01-10 DIAGNOSIS — M54.16 LUMBAR RADICULOPATHY: Primary | ICD-10-CM

## 2025-01-10 PROCEDURE — 3077F SYST BP >= 140 MM HG: CPT | Performed by: SURGERY

## 2025-01-10 PROCEDURE — 99203 OFFICE O/P NEW LOW 30 MIN: CPT | Performed by: SURGERY

## 2025-01-10 PROCEDURE — 3080F DIAST BP >= 90 MM HG: CPT | Performed by: SURGERY

## 2025-01-10 NOTE — PROGRESS NOTES
Hyperparathyroidism (HCC)     Hypomagnesemia     Hypophosphatemia     Leaky heart valve     Liver fatty degeneration     MDRO (multiple drug resistant organisms) resistance 2015    MRSA from spider bite    Pre-diabetes 2015    Prediabetes 10/08/2015    Pulmonary valve disease     Scoliosis     Sleep apnea     no machine    Thyroid disease     hypothryoid    Tobacco dependence     Varicose vein of leg      Family History   Problem Relation Age of Onset    High Blood Pressure Mother     Depression Mother     Colon Cancer Mother          in     Breast Cancer Mother     Thyroid Disease Mother     Heart Disease Father         ruptured aortic aneurysm    High Blood Pressure Father     Diabetes Father     Depression Father     Arthritis Father     Depression Sister     High Blood Pressure Brother     Colon Cancer Maternal Uncle     Other Maternal Grandfather         AAA, aneurysm in brain    Thyroid Disease Maternal Grandmother     High Blood Pressure Maternal Grandmother     Seizures Paternal Grandmother     High Blood Pressure Paternal Grandmother     Stroke Brother     Other Brother         Kyphoplasty      Social History     Socioeconomic History    Marital status:      Spouse name: Froilan    Number of children: 4    Years of education: Not on file    Highest education level: Not on file   Occupational History    Not on file   Tobacco Use    Smoking status: Former     Current packs/day: 0.00     Average packs/day: 0.3 packs/day for 27.4 years (6.9 ttl pk-yrs)     Types: Cigarettes     Start date:      Quit date: 2023     Years since quittin.6    Smokeless tobacco: Never   Substance and Sexual Activity    Alcohol use: No    Drug use: No    Sexual activity: Yes     Partners: Male   Other Topics Concern    Not on file   Social History Narrative    Not on file     Social Determinants of Health     Financial Resource Strain: Low Risk  (2024)    Overall Financial Resource Strain

## 2025-01-16 ENCOUNTER — HOSPITAL ENCOUNTER (OUTPATIENT)
Age: 50
Discharge: HOME | End: 2025-01-16
Payer: COMMERCIAL

## 2025-01-16 ENCOUNTER — HOSPITAL ENCOUNTER (OUTPATIENT)
Dept: MRI IMAGING | Age: 50
Discharge: HOME | End: 2025-01-18
Attending: SURGERY
Payer: COMMERCIAL

## 2025-01-16 DIAGNOSIS — E11.9 TYPE 2 DIABETES MELLITUS WITHOUT COMPLICATION, WITHOUT LONG-TERM CURRENT USE OF INSULIN (HCC): ICD-10-CM

## 2025-01-16 DIAGNOSIS — M54.16 LUMBAR RADICULOPATHY: ICD-10-CM

## 2025-01-16 LAB
ALBUMIN SERPL-MCNC: 4.2 G/DL (ref 3.5–5.2)
ALP SERPL-CCNC: 100 U/L (ref 35–104)
ALT SERPL-CCNC: 68 U/L (ref 10–35)
ANION GAP SERPL CALCULATED.3IONS-SCNC: 10 MMOL/L (ref 9–16)
AST SERPL-CCNC: 45 U/L (ref 10–35)
BASOPHILS # BLD: 0 K/UL (ref 0–0.2)
BASOPHILS NFR BLD: 1 % (ref 0–2)
BILIRUB SERPL-MCNC: 0.2 MG/DL (ref 0–1.2)
BUN SERPL-MCNC: 10 MG/DL (ref 6–20)
CALCIUM SERPL-MCNC: 9.9 MG/DL (ref 8.6–10.4)
CHLORIDE SERPL-SCNC: 103 MMOL/L (ref 98–107)
CHOLEST SERPL-MCNC: 167 MG/DL (ref 0–199)
CHOLESTEROL/HDL RATIO: 5.4
CO2 SERPL-SCNC: 26 MMOL/L (ref 20–31)
CREAT SERPL-MCNC: 0.6 MG/DL (ref 0.7–1.2)
EOSINOPHIL # BLD: 0.1 K/UL (ref 0–0.4)
EOSINOPHILS RELATIVE PERCENT: 2 % (ref 0–4)
ERYTHROCYTE [DISTWIDTH] IN BLOOD BY AUTOMATED COUNT: 12.7 % (ref 11.5–14.9)
GFR, ESTIMATED: >90 ML/MIN/1.73M2
GLUCOSE SERPL-MCNC: 147 MG/DL (ref 74–99)
HCT VFR BLD AUTO: 40.8 % (ref 36–46)
HDLC SERPL-MCNC: 31 MG/DL
HGB BLD-MCNC: 14.2 G/DL (ref 12–16)
LDLC SERPL CALC-MCNC: 95 MG/DL (ref 0–100)
LYMPHOCYTES NFR BLD: 1.9 K/UL (ref 1–4.8)
LYMPHOCYTES RELATIVE PERCENT: 37 % (ref 24–44)
MCH RBC QN AUTO: 32.8 PG (ref 26–34)
MCHC RBC AUTO-ENTMCNC: 34.7 G/DL (ref 31–37)
MCV RBC AUTO: 94.5 FL (ref 80–100)
MONOCYTES NFR BLD: 0.4 K/UL (ref 0.1–1.3)
MONOCYTES NFR BLD: 7 % (ref 1–7)
NEUTROPHILS NFR BLD: 53 % (ref 36–66)
NEUTS SEG NFR BLD: 2.8 K/UL (ref 1.3–9.1)
PLATELET # BLD AUTO: 210 K/UL (ref 150–450)
PMV BLD AUTO: 8.5 FL (ref 6–12)
POTASSIUM SERPL-SCNC: 4.2 MMOL/L (ref 3.7–5.3)
PROT SERPL-MCNC: 7.2 G/DL (ref 6.6–8.7)
RBC # BLD AUTO: 4.31 M/UL (ref 4–5.2)
SODIUM SERPL-SCNC: 139 MMOL/L (ref 136–145)
TRIGL SERPL-MCNC: 206 MG/DL (ref 0–149)
WBC OTHER # BLD: 5.2 K/UL (ref 3.5–11)

## 2025-01-16 PROCEDURE — 80053 COMPREHEN METABOLIC PANEL: CPT

## 2025-01-16 PROCEDURE — 85025 COMPLETE CBC W/AUTO DIFF WBC: CPT

## 2025-01-16 PROCEDURE — 80061 LIPID PANEL: CPT

## 2025-01-16 PROCEDURE — 36415 COLL VENOUS BLD VENIPUNCTURE: CPT

## 2025-01-16 PROCEDURE — 72148 MRI LUMBAR SPINE W/O DYE: CPT

## 2025-01-17 DIAGNOSIS — E11.9 TYPE 2 DIABETES MELLITUS WITHOUT COMPLICATION, WITHOUT LONG-TERM CURRENT USE OF INSULIN: ICD-10-CM

## 2025-01-17 RX ORDER — DULAGLUTIDE 0.75 MG/.5ML
INJECTION, SOLUTION SUBCUTANEOUS
Qty: 4 ML | Refills: 0 | OUTPATIENT
Start: 2025-01-17

## 2025-01-24 ENCOUNTER — OFFICE VISIT (OUTPATIENT)
Dept: VASCULAR SURGERY | Age: 50
End: 2025-01-24
Payer: COMMERCIAL

## 2025-01-24 VITALS
DIASTOLIC BLOOD PRESSURE: 92 MMHG | WEIGHT: 208 LBS | OXYGEN SATURATION: 98 % | RESPIRATION RATE: 16 BRPM | HEIGHT: 62 IN | BODY MASS INDEX: 38.28 KG/M2 | HEART RATE: 89 BPM | SYSTOLIC BLOOD PRESSURE: 131 MMHG

## 2025-01-24 DIAGNOSIS — M54.16 LUMBAR RADICULOPATHY: Primary | ICD-10-CM

## 2025-01-24 PROCEDURE — 3075F SYST BP GE 130 - 139MM HG: CPT | Performed by: SURGERY

## 2025-01-24 PROCEDURE — 99213 OFFICE O/P EST LOW 20 MIN: CPT | Performed by: SURGERY

## 2025-01-24 PROCEDURE — 3080F DIAST BP >= 90 MM HG: CPT | Performed by: SURGERY

## 2025-01-24 NOTE — PROGRESS NOTES
[facility], [facility]  [address]  [place] [address]  Dept: [phone]     Patient: Rosemary Zelaya  : 1975  MRN: [id number]  DOS: 2025            HPI:  Rosemary Zelaya is a 49 y.o. female who returns to the office regarding her lumbar radiculopathy.  Her ZAKI and PVR are normal.  She has an MRI of the lumbar spine revealing moderate stenoses.  She still continues to have significant symptoms of lumbar radiculopathy especially on the left.    Review of Systems    Vitals:    25 1131   BP: (!) 131/92   Site: Left Upper Arm   Position: Sitting   Cuff Size: Medium Adult   Pulse: 89   Resp: 16   SpO2: 98%   Weight: 94.3 kg (208 lb)   Height: 1.575 m (5' 2\")          Physical Exam  On examination she still has palpable distal pulses bilaterally.  Her feet are warm and well-perfused.    Assessment:  1. Lumbar radiculopathy          Plan:  She does not have significant arterial disease and I would advise her to see a spine specialist.  We made her a referral for neurosurgery in the near future and hopefully they can examine her and see if there are any significant issues.  We can see her as needed.    Electronically signed by:  Rodolfo Patiño MD

## 2025-01-30 ENCOUNTER — OFFICE VISIT (OUTPATIENT)
Dept: NEUROSURGERY | Age: 50
End: 2025-01-30
Payer: COMMERCIAL

## 2025-01-30 ENCOUNTER — HOSPITAL ENCOUNTER (OUTPATIENT)
Age: 50
Discharge: HOME | End: 2025-02-01
Payer: COMMERCIAL

## 2025-01-30 ENCOUNTER — HOSPITAL ENCOUNTER (OUTPATIENT)
Dept: GENERAL RADIOLOGY | Age: 50
Discharge: HOME | End: 2025-02-01
Payer: COMMERCIAL

## 2025-01-30 VITALS
WEIGHT: 209 LBS | HEIGHT: 62 IN | BODY MASS INDEX: 38.46 KG/M2 | SYSTOLIC BLOOD PRESSURE: 124 MMHG | DIASTOLIC BLOOD PRESSURE: 86 MMHG | HEART RATE: 78 BPM

## 2025-01-30 DIAGNOSIS — M53.9 MULTILEVEL DEGENERATIVE DISC DISEASE: ICD-10-CM

## 2025-01-30 DIAGNOSIS — M47.26 OTHER SPONDYLOSIS WITH RADICULOPATHY, LUMBAR REGION: ICD-10-CM

## 2025-01-30 DIAGNOSIS — M47.26 OTHER SPONDYLOSIS WITH RADICULOPATHY, LUMBAR REGION: Primary | ICD-10-CM

## 2025-01-30 PROCEDURE — 3074F SYST BP LT 130 MM HG: CPT

## 2025-01-30 PROCEDURE — 3079F DIAST BP 80-89 MM HG: CPT

## 2025-01-30 PROCEDURE — 72082 X-RAY EXAM ENTIRE SPI 2/3 VW: CPT

## 2025-01-30 PROCEDURE — 99204 OFFICE O/P NEW MOD 45 MIN: CPT

## 2025-01-30 PROCEDURE — 72110 X-RAY EXAM L-2 SPINE 4/>VWS: CPT

## 2025-01-30 ASSESSMENT — ENCOUNTER SYMPTOMS: BACK PAIN: 1

## 2025-01-30 NOTE — PROGRESS NOTES
[facility] [place]  [address]  [address]  [address]  [facility] [address]  Dept: [phone]    Patient:  Rosemary Zelaya  YOB: 1975  Date: 1/30/25    The patient is a 49 y.o. female who presents today for consult of the following problems:     Chief Complaint   Patient presents with    New Patient     Referral for lumbar radiculopathy; pain shoots through back and down into left leg. She will experience a burning type pain and numbness in the left leg. Worsened with walking and standing.         HPI:     Rosemary Zelaya is a 49 y.o. female on whom neurosurgical consultation was requested by Lio Porter MD for management of radiating lower back pain.    The patient reports that her back pain started about a year and a half ago and has worsened over the past few months. The pain is located in the lower back, radiating to the left hip and down to the knee. She describes the pain as a combination of burning and numbness in the left leg and aching and throbbing in the lower back. The pain is aggravated by standing, walking, lying on her back, twisting, and pushing. Relief is achieved with pressure on the lower back, sitting, and rest. The symptoms are divided equally between the back and leg, with severity ranging from 6/10 to 12/10, depending on the activity. The patient denies experiencing bladder or bowel incontinence or saddle anesthesia. She has tried OTC Tylenol, NSAIDs, and heat, but these have provided limited relief. Vascular surgery evaluation showed normal ZAKI and PVR, and it was determined that her symptoms are not related to arterial disease.    History:     Past Medical History:   Diagnosis Date    Arthritis     OA    Asthma     Back pain, chronic 10/22/2014    Bright red rectal bleeding     CKD (chronic kidney disease) stage 1, GFR 90 ml/min or greater     Colon polyp 06/10/2019

## 2025-02-14 DIAGNOSIS — E78.5 HYPERLIPIDEMIA, UNSPECIFIED HYPERLIPIDEMIA TYPE: ICD-10-CM

## 2025-02-14 DIAGNOSIS — E11.9 TYPE 2 DIABETES MELLITUS WITHOUT COMPLICATION, WITHOUT LONG-TERM CURRENT USE OF INSULIN (HCC): ICD-10-CM

## 2025-02-14 RX ORDER — ROSUVASTATIN CALCIUM 10 MG/1
10 TABLET, COATED ORAL NIGHTLY
Qty: 30 TABLET | Refills: 2 | Status: SHIPPED | OUTPATIENT
Start: 2025-02-14

## 2025-02-20 ENCOUNTER — HOSPITAL ENCOUNTER (OUTPATIENT)
Age: 50
Setting detail: THERAPIES SERIES
Discharge: HOME | End: 2025-02-20
Payer: COMMERCIAL

## 2025-02-20 PROCEDURE — 97110 THERAPEUTIC EXERCISES: CPT

## 2025-02-20 PROCEDURE — 97530 THERAPEUTIC ACTIVITIES: CPT

## 2025-02-20 PROCEDURE — 97161 PT EVAL LOW COMPLEX 20 MIN: CPT

## 2025-02-20 NOTE — THERAPY EVALUATION
[x] [facility]  Outpatient Rehabilitation &  Therapy  [address]  [phone]  F: [phone]    Physical Therapy Spine Evaluation    Date:  2025  Patient: Rosemary Zelaya  : 1975  MRN: [id number]  Physician: Zulay Hilario, RAFA - CNP      Insurance: [facility] [employer]) (NO PRIOR AUTH REQUIRED, LIMIT OF 60 PT/OT/ST COMBINED PER PATRIZIA YR, 60 REMAIN, HARD MAX LIMIT)  Medical Diagnosis: M47.26 - Other spondylosis with radiculopathy, lumbar region; M53.9 - Multilevel degenerative disc disease    Rehab Codes: M54.5, M54.59, M54.42  Onset Date: 2025   Next 's appt.: 2025 Pain management       Subjective:   CC/HPI: Patient is a 49 y.o. female who presents to outpatient physical therapy with c/o low back pain and radicular pain in the left foot. Patient reports the pain in the low back has been present for a year. Reports is has been gradually worsening with time and her work at [employer]. 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 Reports she has to complete repetitive motions of right rotation to grab the packages. Reports that she often patricia while at work due to the pain.   Currently, she reports that she had increased pain with standing and walking. Reports that she is only able to walk for 2 minutes before the pain in the back and left leg increase. Reports that she fights through the pain she has to get her work and house responsibility. Reports she uses the shopping cart to lean against while grocery shopping to reduce her pain.     PMHx: [x] Refer to full medical chart in [facility]   [] Unremarkable [] Diabetes [] HTN  [] Pacemaker   [] MI/Heart Problems [] Cancer [] Arthritis   [x] Other:  Past Medical History:   Diagnosis Date    Arthritis     OA    Asthma     Back pain, chronic 10/22/2014    Bright red rectal bleeding     CKD (chronic kidney disease) stage 1, GFR 90 ml/min or greater     Colon polyp 06/10/2019    SESSILE SERRATED

## 2025-02-23 DIAGNOSIS — J44.1 COPD EXACERBATION (HCC): ICD-10-CM

## 2025-02-24 RX ORDER — ALBUTEROL SULFATE 90 UG/1
2 INHALANT RESPIRATORY (INHALATION) EVERY 4 HOURS PRN
Qty: 9 G | Refills: 0 | Status: SHIPPED | OUTPATIENT
Start: 2025-02-24

## 2025-02-27 ENCOUNTER — HOSPITAL ENCOUNTER (OUTPATIENT)
Age: 50
Setting detail: THERAPIES SERIES
Discharge: HOME | End: 2025-02-27
Payer: COMMERCIAL

## 2025-02-27 ENCOUNTER — HOSPITAL ENCOUNTER (OUTPATIENT)
Dept: PAIN MANAGEMENT | Age: 50
Discharge: HOME | End: 2025-02-27
Payer: COMMERCIAL

## 2025-02-27 VITALS — HEIGHT: 62 IN | WEIGHT: 209 LBS | BODY MASS INDEX: 38.46 KG/M2

## 2025-02-27 DIAGNOSIS — M47.817 LUMBOSACRAL SPONDYLOSIS WITHOUT MYELOPATHY: Primary | ICD-10-CM

## 2025-02-27 DIAGNOSIS — E11.65 POORLY CONTROLLED DIABETES MELLITUS (HCC): ICD-10-CM

## 2025-02-27 DIAGNOSIS — M54.32 LEFT SIDED SCIATICA: ICD-10-CM

## 2025-02-27 PROCEDURE — 99203 OFFICE O/P NEW LOW 30 MIN: CPT

## 2025-02-27 RX ORDER — NORTRIPTYLINE HYDROCHLORIDE 25 MG/1
25 CAPSULE ORAL NIGHTLY
Qty: 30 CAPSULE | Refills: 3 | Status: SHIPPED | OUTPATIENT
Start: 2025-02-27

## 2025-02-27 ASSESSMENT — PAIN SCALES - GENERAL: PAINLEVEL_OUTOF10: 4

## 2025-02-27 ASSESSMENT — ENCOUNTER SYMPTOMS
RESPIRATORY NEGATIVE: 1
BACK PAIN: 1
GASTROINTESTINAL NEGATIVE: 1

## 2025-02-27 NOTE — PROGRESS NOTES
The patient is a 49 y.o.Non- / non  female.    Chief Complaint   Patient presents with    Consultation    Back Pain         Pain History  49-year-old female with past medical history significant for obesity, BMI above 38, poorly controlled diabetes last A1c in November 2024 was 9.6  Referred for evaluation of chronic low back pain going on for more than a year  Pain is located in the lower lumbar area across midline left side more than right  Report radiation of pain down left leg over hip gluteal region all the way to the foot  No particular injury associated with the pain  Pain aggravated with sitting standing and walking  Rest seems to alleviate the pain  Associated with left leg numbness and paresthesia  Denies any changes in bladder or bowel control  No focal weakness  No previous lumbar spine interventional procedure  No previous lumbar spine surgical history  Had recent MRI lumbar spine  Report was reviewed  Images were reviewed independently  My reading of the imaging shows lumbar spondylitic changes with facet arthropathy at lower lumbar spine L4-5 and L5-S1 level  No significant disc herniation or nerve impingement or stenosis    She was recently evaluated by neurosurgery, no surgery advised  Was referred for conservative management  Did initial consultation with therapy  Have not attended any sessions since then    Patient have tried over-the-counter NSAIDs  Pain is affecting quality of life and activity level  Pain score today: 4/10, today   1. Location: Lumbar Spine     2. Radiation: LT leg to knee  3. Character: Burning  5. Duration: Until pt sits down   6. Onset: 2024  7. Did an injury cause pain: No   8. Aggravating factors: sitting/standing/walking   9. Alleviating factors: lying down on RT side, sitting in a chair with a pillow   10. Associated symptoms (numbness / tingling / weakness): numbness  -Where at: Lt Leg   -Down into finger tips or toes (specify which finger or toes):

## 2025-02-27 NOTE — FLOWSHEET NOTE
[x] [facility]  Outpatient Rehabilitation &  Therapy  [address]  [phone]  F: [phone]     Therapy Cancel/No Show note    Date: 2025  Patient: Rosemary Zelaya  : 1975  MRN: [id number]    Cancels/No Shows to date:     For today's appointment patient:    [x]  Cancelled    [x] Rescheduled appointment    [] No-show     Reason given by patient:    [x]  Patient ill    []  Conflicting appointment    [] No transportation      [] Conflict with work    [] No reason given    [] Weather related    [] COVID-19    [] Other:      Comments:  \": pt cx appt - at 1510 . Pt reports she is sick and would like to reschedule late next week. rescheduled for 3/6/25 at 1100\"      [x] Next appointment was confirmed    Electronically signed by: Marlen Stein, PT

## 2025-03-06 ENCOUNTER — HOSPITAL ENCOUNTER (OUTPATIENT)
Age: 50
Setting detail: THERAPIES SERIES
Discharge: HOME | End: 2025-03-06

## 2025-03-06 NOTE — FLOWSHEET NOTE
[x] [facility]  Outpatient Rehabilitation &  Therapy  [address] [place] [address]  [phone]  F: [phone]     Therapy Cancel/No Show note    Date: 3/6/2025  Patient: Rosemary Zelaya  : 1975  MRN: [id number]    Cancels/No Shows to date:     For today's appointment patient:    [x]  Cancelled    [x] Rescheduled appointment    [] No-show     Reason given by patient:    []  Patient ill    []  Conflicting appointment    [] No transportation      [] Conflict with work    [] No reason given    [] Weather related    [] COVID-19    [] Other:      Comments: No future appointments scheduled. Will discharge if Pt does not reach out by 3/13/2025      [x] Next appointment was confirmed    Electronically signed by: Curtis Carmona, SPT ,Student Physical Therapist  This document has been reviewed and approved by Marlen Stein, PT, DPT

## 2025-03-12 DIAGNOSIS — E11.9 TYPE 2 DIABETES MELLITUS WITHOUT COMPLICATION, WITHOUT LONG-TERM CURRENT USE OF INSULIN (HCC): ICD-10-CM

## 2025-03-13 ENCOUNTER — OFFICE VISIT (OUTPATIENT)
Dept: NEUROSURGERY | Age: 50
End: 2025-03-13
Payer: COMMERCIAL

## 2025-03-13 VITALS
WEIGHT: 213 LBS | SYSTOLIC BLOOD PRESSURE: 115 MMHG | HEART RATE: 79 BPM | HEIGHT: 62 IN | BODY MASS INDEX: 39.2 KG/M2 | DIASTOLIC BLOOD PRESSURE: 79 MMHG

## 2025-03-13 DIAGNOSIS — M47.26 OTHER SPONDYLOSIS WITH RADICULOPATHY, LUMBAR REGION: Primary | ICD-10-CM

## 2025-03-13 DIAGNOSIS — E11.9 TYPE 2 DIABETES MELLITUS WITHOUT COMPLICATION, WITHOUT LONG-TERM CURRENT USE OF INSULIN (HCC): ICD-10-CM

## 2025-03-13 DIAGNOSIS — M53.9 MULTILEVEL DEGENERATIVE DISC DISEASE: ICD-10-CM

## 2025-03-13 PROCEDURE — 3074F SYST BP LT 130 MM HG: CPT

## 2025-03-13 PROCEDURE — 3078F DIAST BP <80 MM HG: CPT

## 2025-03-13 PROCEDURE — 99214 OFFICE O/P EST MOD 30 MIN: CPT

## 2025-03-13 RX ORDER — DULAGLUTIDE 1.5 MG/.5ML
1.5 INJECTION, SOLUTION SUBCUTANEOUS WEEKLY
Qty: 2 ML | Refills: 1 | Status: SHIPPED | OUTPATIENT
Start: 2025-03-13 | End: 2025-05-12

## 2025-03-13 RX ORDER — DULAGLUTIDE 1.5 MG/.5ML
INJECTION, SOLUTION SUBCUTANEOUS
Qty: 4 ML | Refills: 0 | OUTPATIENT
Start: 2025-03-13

## 2025-03-13 NOTE — PROGRESS NOTES
[facility] NEUROSURGERY [place] [address]  [address]  [address]  [facility] [address]  Dept: [phone]    Patient:  Rosemary Zelaya  YOB: 1975  Date: 3/12/25    The patient is a 49 y.o. female who presents today for consult of the following problems:     Chief Complaint   Patient presents with    Follow-up      spondylosis with radiculopathy, lumbar region           HPI:     Rosemary Zelaya is a 49 y.o. female who presents for follow up of radiating lower back pain.    Patient was last seen in the office on 1/30, she started physical therapy and saw pain management. Pain is deferring injections currently due to her A1c level.  Patient reports worsening lower back pain for the past few months, radiating to the left hip and knee, with burning and numbness in the left leg. Pain was aggravated by standing, walking, and twisting, with relief from sitting and rest. She is only able to stand for about 5 minutes or walk for 2 minutes due to back and leg pain. Severity ranges from 6/10 to 12/10, and she denied any bladder or bowel issues. She had tried OTC Tylenol, NSAIDs, and heat with limited relief. No bladder or bowel incontinence. No saddle anesthesia. Back and left hip pain is worse than leg pain. Vascular surgery ruled out arterial disease after normal ZAKI and PVR results.    History:     Past Medical History:   Diagnosis Date    Arthritis     OA    Asthma     Back pain, chronic 10/22/2014    Bright red rectal bleeding     CKD (chronic kidney disease) stage 1, GFR 90 ml/min or greater     Colon polyp 06/10/2019    SESSILE SERRATED ADENOMA    COPD (chronic obstructive pulmonary disease) ([facility])     CVA (cerebral infarction) 1997    Rt. side numbness & weakness    Depression     Diabetes mellitus ([facility])     currently diet controlled    Diarrhea 12/11/2014    Diverticulosis     DVT (deep venous thrombosis) ([facility])

## 2025-03-20 ENCOUNTER — APPOINTMENT (OUTPATIENT)
Age: 50
End: 2025-03-20
Payer: COMMERCIAL

## 2025-03-21 ENCOUNTER — HOSPITAL ENCOUNTER (OUTPATIENT)
Age: 50
Setting detail: THERAPIES SERIES
Discharge: HOME | End: 2025-03-21
Payer: COMMERCIAL

## 2025-03-21 PROCEDURE — 97140 MANUAL THERAPY 1/> REGIONS: CPT | Performed by: PHYSICAL THERAPIST

## 2025-03-21 PROCEDURE — 97110 THERAPEUTIC EXERCISES: CPT | Performed by: PHYSICAL THERAPIST

## 2025-03-21 NOTE — FLOWSHEET NOTE
Exercise Programs []  []  []      6. Demonstrate knowledge of fall risk prevention  []  []  []      Date Addressed:            LTG: To be met in 12 treatments           1. Improve score on assessment tool Oswestry  from 56% impairment to less than 36% impairment  []  []  []      2. Reduce pain levels to 3/10 or less in the lumbar spine and LLE with ADLs/work []  []  []      3. Patient to demonstrate ability to complete squats with proper technique for lifting.  []  []  []                       Pt. Education:  [x] Yes  [] No  [x] Reviewed Prior HEP/Ed  Method of Education: [x] Verbal  [x] Demo  [] Written -- reviewed exs  Comprehension of Education:  [] Verbalizes understanding.  [] Demonstrates understanding.  [x] Needs review.  [] Demonstrates/verbalizes HEP/Ed previously given.     Access Code: [email or web address]  URL: [email or web address]  Date: 02/20/2025  Prepared by: Marlen Stein     Exercises  - Lying Prone with 2 Pillows  - 1 x daily - 7 x weekly  - Prone Press Up On Elbows  - 1 x daily - 7 x weekly - 10 reps  - Static Prone on Elbows  - 1 x daily - 7 x weekly - 3 sets - 10 reps  - Seated Trunk Rotation - Arms Crossed  - 1 x daily - 7 x weekly - 5 reps - 5 (sec) hold    Plan: [x] Continue current frequency toward long and short term goals.    [x] Specific Instructions for subsequent treatments: Continue with Lui extension exercises. Gentle mobility and stretching. Modalities as needed.       Time In:1359            Time Out: 1435    Electronically signed by:  Nuris Jimenez, PT

## 2025-03-28 ENCOUNTER — HOSPITAL ENCOUNTER (OUTPATIENT)
Age: 50
Setting detail: THERAPIES SERIES
Discharge: HOME | End: 2025-03-28
Payer: COMMERCIAL

## 2025-03-28 PROCEDURE — 97140 MANUAL THERAPY 1/> REGIONS: CPT

## 2025-03-28 PROCEDURE — 97110 THERAPEUTIC EXERCISES: CPT

## 2025-03-28 NOTE — FLOWSHEET NOTE
[x] [facility]  Outpatient Rehabilitation &  Therapy  [address]  [phone]  [phone] [] [facility]  Outpatient Rehabilitation &  Therapy  [address]  P: [phone]  F: [phone] [] [facility]  Outpatient Rehabilitation &  Therapy  [address]  P: [phone]  F: [phone] [] [facility]  Outpatient Rehabilitation &  Therapy  [address]  [phone]  [phone] [] [facility]  Outpatient Rehabilitation &  Therapy  [address]   P: [phone]  F: [phone]  [] [facility]  Outpatient Rehabilitation &  Therapy  [address]  P: [phone]  F: [phone] [] [facility]  Outpatient Rehabilitation &  Therapy  [address]  P: [phone]  F: [phone] [] [facility]  Outpatient Rehabilitation &  Therapy  [address]  P: [phone]  F: [phone] [] [facility]  Outpatient Rehabilitation &  Therapy  [address]  P: [phone]  F: [phone]  [] [facility] Outpatient Rehabilitation &  Therapy  [address]  P: [phone]  F: [phone]     Physical Therapy Daily Treatment Note    Date:  3/28/2025  Patient Name:  Rosemary Zelaya    :  1975  MRN: [id number]  Physician: Zulay Hilario, ARFA - YOUSIF                                     Insurance: [employer] P[employer] ([employer]) (NO PRIOR AUTH REQUIRED, LIMIT OF 60 PT/OT/ST COMBINED PER PATRIZIA YR, 60 REMAIN, HARD MAX LIMIT)  Medical Diagnosis: M47.26 - Other spondylosis with radiculopathy, lumbar region; M53.9 - Multilevel degenerative disc disease             Rehab Codes: M54.5, M54.59, M54.42  Onset Date: 2025          Next 's appt.: 2025 Pain management   Visit# / total visits: 3/12    Cancels/No Shows:

## 2025-04-03 ENCOUNTER — HOSPITAL ENCOUNTER (OUTPATIENT)
Age: 50
Setting detail: THERAPIES SERIES
Discharge: HOME | End: 2025-04-03
Payer: COMMERCIAL

## 2025-04-03 PROCEDURE — 97110 THERAPEUTIC EXERCISES: CPT

## 2025-04-03 NOTE — FLOWSHEET NOTE
[x] [facility]  Outpatient Rehabilitation &  Therapy  [address]  [phone]  [phone] [] [facility]  Outpatient Rehabilitation &  Therapy  [address]  P: [phone]  F: [phone] [] [facility]  Outpatient Rehabilitation &  Therapy  [address]  P: [phone]  F: [phone] [] [facility]  Outpatient Rehabilitation &  Therapy  [address]  [phone]  [phone] [] [facility]  Outpatient Rehabilitation &  Therapy  [address]   P: [phone]  F: [phone]  [] [facility]  Outpatient Rehabilitation &  Therapy  [address]  P: [phone]  F: [phone] [] [facility]  Outpatient Rehabilitation &  Therapy  [address]  P: [phone]  F: [phone] [] [facility]  Outpatient Rehabilitation &  Therapy  [address]  P: [phone]  F: [phone] [] [facility]  Outpatient Rehabilitation &  Therapy  [address]  P: [phone]  F: [phone]  [] [facility] Outpatient Rehabilitation &  Therapy  [address]  P: [phone]  F: [phone]     Physical Therapy Daily Treatment Note    Date:  4/3/2025  Patient Name:  Rosemary Zelaya    :  1975  MRN: [id number]  Physician: Zulay Hilario, RAFA - YOUSIF                                     Insurance: [employer] PREMERA PPO ([employer]) (NO PRIOR AUTH REQUIRED, LIMIT OF 60 PT/OT/ST COMBINED PER PATRIZIA YR, 60 REMAIN, HARD MAX LIMIT)  Medical Diagnosis: M47.26 - Other spondylosis with radiculopathy, lumbar region; M53.9 - Multilevel degenerative disc disease             Rehab Codes: M54.5, M54.59, M54.42  Onset Date: 2025          Next 's appt.: 2025 Pain management     Visit# / total visits:     Cancels/No Shows:

## 2025-04-10 ENCOUNTER — HOSPITAL ENCOUNTER (OUTPATIENT)
Age: 50
Setting detail: THERAPIES SERIES
Discharge: HOME | End: 2025-04-10
Payer: COMMERCIAL

## 2025-04-10 PROCEDURE — 97110 THERAPEUTIC EXERCISES: CPT

## 2025-04-10 PROCEDURE — 97140 MANUAL THERAPY 1/> REGIONS: CPT

## 2025-04-10 NOTE — FLOWSHEET NOTE
[x] [facility]  Outpatient Rehabilitation &  Therapy  [address]  [phone]  [phone]     Physical Therapy Daily Treatment Note    Date:  4/10/2025  Patient Name:  Rosemary Zelaya    :  1975  MRN: [id number]  Physician: Zulay Hilario, RAFA - YOUSIF                                     Insurance: [employer] ('[employer]) (NO PRIOR AUTH REQUIRED, LIMIT OF 60 PT/OT/ST COMBINED PER PATRIZIA YR, 60 REMAIN, HARD MAX LIMIT)  Medical Diagnosis: M47.26 - Other spondylosis with radiculopathy, lumbar region; M53.9 - Multilevel degenerative disc disease             Rehab Codes: M54.5, M54.59, M54.42  Onset Date: 2025          Next 's appt.: 2025 Pain management     Visit# / total visits: 12    Cancels/No Shows: 0/0    Subjective:    Pain:  [x] Yes  [] No .  Location: Left side low back with pain radiating to left knee   Pain Rating: (0-10 scale) 4/10  Pain altered Tx:  [x] No  [] Yes  Action:   Comments:  Pt states has had a lot of life events going on since last treatment so is kind of tired/exhausted from that. Has had some onset of sharp pains through the low back at times, but with rest subsides. Notes minimally lower pain numeric compared to last treatment and less severe symptoms    Objective:  Modalities:   Precautions [x] No  [] Yes:   Exercises:  Exercise Reps/ Time Weight/ Level Completed 04/10/25  Comments   SCIFIT 6 min L2 x                  Prone lying 2 min  x Increased pressure in L LE, increased burning in LB at end of completion   YELENA    Attempt when tolerable in  lying   Trunk rotation stretch in sidelying 10 x ea 3-5\"            Sidelying       Side clamshell 15x  x Left leg only   Reverse clamshell 15x  x Left leg only          Supine       SKTC 3x 10 sec x    Bridge 2x10      Supine hip abduction 10 x   Orange slider   Hip adduction set w/ball 10 x 5\" x    LTR 5x10\" ea   x           Checked pelvic alignment and LLD    No discrepancies

## 2025-04-17 ENCOUNTER — HOSPITAL ENCOUNTER (OUTPATIENT)
Age: 50
Setting detail: THERAPIES SERIES
Discharge: HOME | End: 2025-04-17
Payer: COMMERCIAL

## 2025-04-17 PROCEDURE — 97140 MANUAL THERAPY 1/> REGIONS: CPT

## 2025-04-17 PROCEDURE — 97110 THERAPEUTIC EXERCISES: CPT

## 2025-04-17 NOTE — FLOWSHEET NOTE
[x] [facility]  Outpatient Rehabilitation &  Therapy  [address]  [phone]  [phone]     Physical Therapy Daily Treatment Note    Date:  2025  Patient Name:  Rosemary Zelaya    :  1975  MRN: [id number]  Physician: Zulay Hilario, RAFA - YOUSIF                                     Insurance: [employer] PPO ([employer]) (NO PRIOR AUTH REQUIRED, LIMIT OF 60 PT/OT/ST COMBINED PER PATRIZIA YR, 60 REMAIN, HARD MAX LIMIT)  Medical Diagnosis: M47.26 - Other spondylosis with radiculopathy, lumbar region; M53.9 - Multilevel degenerative disc disease             Rehab Codes: M54.5, M54.59, M54.42  Onset Date: 2025          Next 's appt.: 2025 Pain management     Visit# / total visits:     Cancels/No Shows: 0/0    Subjective:    Pain:  [x] Yes  [] No .  Location: Left side low back with pain radiating to left knee   Pain Rating: (0-10 scale) 4-5/10  Pain altered Tx:  [x] No  [] Yes  Action:   Comments:  Pt reports difficulty with sleeping last night due to pain in the low back. States she sleeps on her R side at night. Pt reporting that pain in the low back could have been caused by sitting on a \"low couch\" when visiting her grandchildren. States that she was also playing with grandchildren and one of them jumped on her, which she suspects led to an inc in pain in the low back this tx session.    Objective:  Modalities:   Precautions [x] No  [] Yes:   Exercises:  Exercise Reps/ Time Weight/ Level Completed 25  Comments   SCIFIT 5 min L2 x Warm-up billed for taking subjective. Pt reporting inc in pain .          Prone       Prone lying 4 min  x Increased pressure in L LE, increased burning in LB at end of completion   YELENA    Attempt when tolerable in  lying   Trunk rotation stretch in sidelying 10 x ea 3-5\"     Manual:   Passive intervertebral motions grade 1-2 10 minutes  x Palpation of full thoracic and lumbar spine.     Grade 1-2 mobilizations

## 2025-04-18 NOTE — CARE COORDINATION
[x] [facility]  Outpatient Rehabilitation &  Therapy  [address]  [phone]  [phone]     THERAPY RESPONSIBILITY OF CARE TRANSFER FORM       PATIENT NAME: Rosemary Zelaya   MRN: [id number]    : 1975       TRANSFERRING FACILITY:    [] [place]   [] [facility]   [] [facility]   [] [facility]   [] [facility] [] [place]   [x] [facility]  [] [facility]  [] [facility]   [] [place]  [] [place]   [] Other:          ACCEPTING FACILITY  [] [place]   [] [facility]   [] [facility]   [] [facility]   [] [facility] [] [place]   [x] [facility]  [] [facility]  [] [facility]   [] [place]  [] [place]   [] Other:         REASON FOR TRANSFER: Primary therapist going on Maternity leave.       TRANSFER OF CARE:    I am transferring the care of the above patient to: Rudi Fuller, HILDA Stein PT  2025      ACCEPTANCE OF CARE:     I am accepting the care of the above patient. Rudi Fuller, PT

## 2025-04-21 ENCOUNTER — OFFICE VISIT (OUTPATIENT)
Dept: NEUROSURGERY | Age: 50
End: 2025-04-21
Payer: COMMERCIAL

## 2025-04-21 VITALS
OXYGEN SATURATION: 95 % | SYSTOLIC BLOOD PRESSURE: 116 MMHG | HEART RATE: 85 BPM | DIASTOLIC BLOOD PRESSURE: 71 MMHG | BODY MASS INDEX: 38.87 KG/M2 | WEIGHT: 211.2 LBS | HEIGHT: 62 IN

## 2025-04-21 DIAGNOSIS — G57.12 MERALGIA PARESTHETICA OF LEFT SIDE: Primary | ICD-10-CM

## 2025-04-21 DIAGNOSIS — M47.26 OTHER SPONDYLOSIS WITH RADICULOPATHY, LUMBAR REGION: ICD-10-CM

## 2025-04-21 PROCEDURE — 3078F DIAST BP <80 MM HG: CPT | Performed by: NEUROLOGICAL SURGERY

## 2025-04-21 PROCEDURE — 3074F SYST BP LT 130 MM HG: CPT | Performed by: NEUROLOGICAL SURGERY

## 2025-04-21 PROCEDURE — 99213 OFFICE O/P EST LOW 20 MIN: CPT | Performed by: NEUROLOGICAL SURGERY

## 2025-04-21 NOTE — PROGRESS NOTES
Brother     Other Brother         Kyphoplasty     Current Outpatient Medications on File Prior to Visit   Medication Sig Dispense Refill    dulaglutide (TRULICITY) 1.5 MG/0.5ML SC injection Inject 0.5 mLs into the skin once a week 2 mL 1    albuterol sulfate HFA (PROVENTIL;VENTOLIN;PROAIR) 108 (90 Base) MCG/ACT inhaler INHALE 2 PUFFS BY MOUTH EVERY 4 HOURS AS NEEDED FOR WHEEZING 9 g 0    rosuvastatin (CRESTOR) 10 MG tablet Take 1 tablet by mouth nightly 30 tablet 2    levothyroxine (SYNTHROID) 150 MCG tablet Take 1 tablet by mouth daily 90 tablet 3    lisinopril (PRINIVIL;ZESTRIL) 5 MG tablet Take 1 tablet by mouth daily 90 tablet 3    metFORMIN (GLUCOPHAGE) 1000 MG tablet Take 1 tablet by mouth 2 times daily (with meals) 180 tablet 3    albuterol (PROVENTIL) (2.5 MG/3ML) 0.083% nebulizer solution Take 3 mLs by nebulization every 4 hours as needed for Wheezing 300 mL 9    ipratropium-albuterol (DUONEB) 0.5-2.5 (3) MG/3ML SOLN nebulizer solution Inhale 3 mLs into the lungs every 4 hours as needed for Shortness of Breath 360 mL 3    Respiratory Therapy Supplies (NEBULIZER AIR TUBE/PLUGS) MISC 1 each by Does not apply route daily 1 each 0    nortriptyline (PAMELOR) 25 MG capsule Take 1 capsule by mouth nightly (Patient not taking: Reported on 4/21/2025) 30 capsule 3    methocarbamol (ROBAXIN) 500 MG tablet Take 1 tablet by mouth 2 times daily (Patient not taking: Reported on 4/21/2025)      ondansetron (ZOFRAN ODT) 4 MG disintegrating tablet Take 1 tablet by mouth every 8 hours as needed for Nausea (Patient not taking: Reported on 4/21/2025) 10 tablet 0    pantoprazole (PROTONIX) 20 MG tablet Take 2 tablets by mouth daily (Patient not taking: Reported on 4/21/2025) 30 tablet 0    aspirin 325 MG EC tablet Take 81 mg by mouth (Patient not taking: Reported on 4/21/2025)      tiotropium (SPIRIVA HANDIHALER) 18 MCG inhalation capsule Inhale 1 capsule into the lungs daily (Patient not taking: Reported on 4/21/2025) 90

## 2025-04-24 ENCOUNTER — APPOINTMENT (OUTPATIENT)
Age: 50
End: 2025-04-24
Payer: COMMERCIAL

## 2025-04-29 ENCOUNTER — HOSPITAL ENCOUNTER (OUTPATIENT)
Age: 50
Discharge: HOME | End: 2025-04-29
Payer: COMMERCIAL

## 2025-04-29 DIAGNOSIS — Z13.220 SCREENING FOR HYPERLIPIDEMIA: ICD-10-CM

## 2025-04-29 DIAGNOSIS — E11.9 TYPE 2 DIABETES MELLITUS WITHOUT COMPLICATION, WITHOUT LONG-TERM CURRENT USE OF INSULIN (HCC): ICD-10-CM

## 2025-04-29 DIAGNOSIS — E03.9 HYPOTHYROIDISM, UNSPECIFIED TYPE: ICD-10-CM

## 2025-04-29 LAB
ALBUMIN SERPL-MCNC: 4.2 G/DL (ref 3.5–5.2)
ALP SERPL-CCNC: 85 U/L (ref 35–104)
ALT SERPL-CCNC: 48 U/L (ref 10–35)
ANION GAP SERPL CALCULATED.3IONS-SCNC: 10 MMOL/L (ref 9–16)
AST SERPL-CCNC: 38 U/L (ref 10–35)
BILIRUB SERPL-MCNC: 0.6 MG/DL (ref 0–1.2)
BUN SERPL-MCNC: 11 MG/DL (ref 6–20)
CALCIUM SERPL-MCNC: 9.8 MG/DL (ref 8.6–10.4)
CHLORIDE SERPL-SCNC: 101 MMOL/L (ref 98–107)
CHOLEST SERPL-MCNC: 115 MG/DL (ref 0–199)
CHOLESTEROL/HDL RATIO: 3.7
CO2 SERPL-SCNC: 25 MMOL/L (ref 20–31)
CREAT SERPL-MCNC: 0.6 MG/DL (ref 0.7–1.2)
CREAT UR-MCNC: 75.6 MG/DL (ref 28–217)
GFR, ESTIMATED: >90 ML/MIN/1.73M2
GLUCOSE SERPL-MCNC: 138 MG/DL (ref 74–99)
HDLC SERPL-MCNC: 31 MG/DL
LDLC SERPL CALC-MCNC: 57 MG/DL (ref 0–100)
MICROALBUMIN UR-MCNC: 21 MG/L (ref 0–20)
MICROALBUMIN/CREAT UR-RTO: 28 MCG/MG CREAT (ref 0–25)
POTASSIUM SERPL-SCNC: 4.2 MMOL/L (ref 3.7–5.3)
PROT SERPL-MCNC: 7.1 G/DL (ref 6.6–8.7)
SODIUM SERPL-SCNC: 136 MMOL/L (ref 136–145)
TRIGL SERPL-MCNC: 135 MG/DL (ref 0–149)
TSH SERPL DL<=0.05 MIU/L-ACNC: 0.62 UIU/ML (ref 0.27–4.2)

## 2025-04-29 PROCEDURE — 80053 COMPREHEN METABOLIC PANEL: CPT

## 2025-04-29 PROCEDURE — 82570 ASSAY OF URINE CREATININE: CPT

## 2025-04-29 PROCEDURE — 80061 LIPID PANEL: CPT

## 2025-04-29 PROCEDURE — 82043 UR ALBUMIN QUANTITATIVE: CPT

## 2025-04-29 PROCEDURE — 36415 COLL VENOUS BLD VENIPUNCTURE: CPT

## 2025-04-29 PROCEDURE — 84443 ASSAY THYROID STIM HORMONE: CPT

## 2025-05-01 ENCOUNTER — TELEPHONE (OUTPATIENT)
Dept: INTERNAL MEDICINE CLINIC | Age: 50
End: 2025-05-01

## 2025-05-01 DIAGNOSIS — E11.9 TYPE 2 DIABETES MELLITUS WITHOUT COMPLICATION, WITHOUT LONG-TERM CURRENT USE OF INSULIN (HCC): ICD-10-CM

## 2025-05-02 RX ORDER — DULAGLUTIDE 1.5 MG/.5ML
1.5 INJECTION, SOLUTION SUBCUTANEOUS WEEKLY
Qty: 2 ML | Refills: 1 | Status: SHIPPED | OUTPATIENT
Start: 2025-05-02 | End: 2025-07-01

## 2025-06-11 ENCOUNTER — TELEPHONE (OUTPATIENT)
Dept: INTERNAL MEDICINE CLINIC | Age: 50
End: 2025-06-11

## 2025-06-11 NOTE — TELEPHONE ENCOUNTER
Patient was seen in ED 6/10 and given the night off of work. Patient is still not feeling well and needs another work note for today. Informed patient she would need seen for a work note and I have no availability until July. Informed patient to go to . Patient verbalized understanding.

## 2025-07-03 DIAGNOSIS — E11.9 TYPE 2 DIABETES MELLITUS WITHOUT COMPLICATION, WITHOUT LONG-TERM CURRENT USE OF INSULIN (HCC): ICD-10-CM

## 2025-07-03 RX ORDER — DULAGLUTIDE 1.5 MG/.5ML
1.5 INJECTION, SOLUTION SUBCUTANEOUS WEEKLY
Qty: 2 ML | Refills: 1 | Status: SHIPPED | OUTPATIENT
Start: 2025-07-03 | End: 2025-09-01

## 2025-07-08 ENCOUNTER — TELEPHONE (OUTPATIENT)
Dept: INTERNAL MEDICINE CLINIC | Age: 50
End: 2025-07-08

## 2025-07-08 NOTE — TELEPHONE ENCOUNTER
LM to return call and reschedule appt with another provider. My chart message sent as well as a letter.

## 2025-07-17 ENCOUNTER — TELEPHONE (OUTPATIENT)
Dept: NEUROSURGERY | Age: 50
End: 2025-07-17

## 2025-07-17 NOTE — TELEPHONE ENCOUNTER
Patient was suppose to get injections with  but I reschedule pt appt because they were never completed. Referral was never place. Can you please advise

## 2025-07-25 ENCOUNTER — TELEPHONE (OUTPATIENT)
Dept: INTERNAL MEDICINE CLINIC | Age: 50
End: 2025-07-25

## 2025-07-25 DIAGNOSIS — J44.1 COPD EXACERBATION (HCC): ICD-10-CM

## 2025-07-25 RX ORDER — ALBUTEROL SULFATE 90 UG/1
2 INHALANT RESPIRATORY (INHALATION) EVERY 4 HOURS PRN
Qty: 9 G | Refills: 0 | Status: SHIPPED | OUTPATIENT
Start: 2025-07-25

## 2025-07-25 NOTE — TELEPHONE ENCOUNTER
Received [employer] forms. Had Dr. Cruz complete. Forms faxed back to [employer].    LVM letting pt know forms were faxed.

## 2025-07-29 DIAGNOSIS — E11.9 TYPE 2 DIABETES MELLITUS WITHOUT COMPLICATION, WITHOUT LONG-TERM CURRENT USE OF INSULIN (HCC): ICD-10-CM

## 2025-07-29 DIAGNOSIS — E78.5 HYPERLIPIDEMIA, UNSPECIFIED HYPERLIPIDEMIA TYPE: ICD-10-CM

## 2025-07-29 RX ORDER — ROSUVASTATIN CALCIUM 10 MG/1
10 TABLET, COATED ORAL NIGHTLY
Qty: 30 TABLET | Refills: 0 | Status: SHIPPED | OUTPATIENT
Start: 2025-07-29

## 2025-08-11 ENCOUNTER — TELEPHONE (OUTPATIENT)
Dept: INTERNAL MEDICINE CLINIC | Age: 50
End: 2025-08-11

## 2025-08-11 ENCOUNTER — OFFICE VISIT (OUTPATIENT)
Dept: INTERNAL MEDICINE CLINIC | Age: 50
End: 2025-08-11
Payer: COMMERCIAL

## 2025-08-11 VITALS
WEIGHT: 210 LBS | SYSTOLIC BLOOD PRESSURE: 118 MMHG | OXYGEN SATURATION: 98 % | HEIGHT: 62 IN | DIASTOLIC BLOOD PRESSURE: 72 MMHG | HEART RATE: 94 BPM | BODY MASS INDEX: 38.64 KG/M2

## 2025-08-11 DIAGNOSIS — E11.9 TYPE 2 DIABETES MELLITUS WITHOUT COMPLICATION, WITHOUT LONG-TERM CURRENT USE OF INSULIN (HCC): Primary | ICD-10-CM

## 2025-08-11 DIAGNOSIS — Z12.31 ENCOUNTER FOR SCREENING MAMMOGRAM FOR BREAST CANCER: ICD-10-CM

## 2025-08-11 LAB — HBA1C MFR BLD: 6.9 %

## 2025-08-11 PROCEDURE — 3044F HG A1C LEVEL LT 7.0%: CPT | Performed by: STUDENT IN AN ORGANIZED HEALTH CARE EDUCATION/TRAINING PROGRAM

## 2025-08-11 PROCEDURE — 3074F SYST BP LT 130 MM HG: CPT | Performed by: STUDENT IN AN ORGANIZED HEALTH CARE EDUCATION/TRAINING PROGRAM

## 2025-08-11 PROCEDURE — 3078F DIAST BP <80 MM HG: CPT | Performed by: STUDENT IN AN ORGANIZED HEALTH CARE EDUCATION/TRAINING PROGRAM

## 2025-08-11 PROCEDURE — 99213 OFFICE O/P EST LOW 20 MIN: CPT | Performed by: STUDENT IN AN ORGANIZED HEALTH CARE EDUCATION/TRAINING PROGRAM

## 2025-08-11 PROCEDURE — 83036 HEMOGLOBIN GLYCOSYLATED A1C: CPT | Performed by: STUDENT IN AN ORGANIZED HEALTH CARE EDUCATION/TRAINING PROGRAM

## 2025-08-11 SDOH — ECONOMIC STABILITY: FOOD INSECURITY: WITHIN THE PAST 12 MONTHS, THE FOOD YOU BOUGHT JUST DIDN'T LAST AND YOU DIDN'T HAVE MONEY TO GET MORE.: NEVER TRUE

## 2025-08-11 SDOH — ECONOMIC STABILITY: FOOD INSECURITY: WITHIN THE PAST 12 MONTHS, YOU WORRIED THAT YOUR FOOD WOULD RUN OUT BEFORE YOU GOT MONEY TO BUY MORE.: NEVER TRUE

## 2025-08-11 ASSESSMENT — ENCOUNTER SYMPTOMS
NAUSEA: 0
SHORTNESS OF BREATH: 0
SINUS PAIN: 0
SINUS PRESSURE: 0
WHEEZING: 0
BLOOD IN STOOL: 0
RHINORRHEA: 0
CONSTIPATION: 0
COUGH: 0
DIARRHEA: 0
SORE THROAT: 0
CHEST TIGHTNESS: 0
ABDOMINAL PAIN: 0

## 2025-08-31 DIAGNOSIS — E11.9 TYPE 2 DIABETES MELLITUS WITHOUT COMPLICATION, WITHOUT LONG-TERM CURRENT USE OF INSULIN (HCC): ICD-10-CM

## 2025-08-31 DIAGNOSIS — E78.5 HYPERLIPIDEMIA, UNSPECIFIED HYPERLIPIDEMIA TYPE: ICD-10-CM

## 2025-09-02 RX ORDER — ROSUVASTATIN CALCIUM 10 MG/1
10 TABLET, COATED ORAL NIGHTLY
Qty: 30 TABLET | Refills: 0 | Status: SHIPPED | OUTPATIENT
Start: 2025-09-02

## (undated) DEVICE — SPONGE GZ W3XL3IN 4 PLY RAYON POLY STD NONWOVEN

## (undated) DEVICE — SUTURE MCRYL SZ 4-0 L18IN ABSRB UD L16MM PC-3 3/8 CIR PRIM Y845G

## (undated) DEVICE — Z DISCONTINUED BY MEDLINE USE 2711682 TRAY SKIN PREP DRY W/ PREM GLV

## (undated) DEVICE — SVMMC HD AND NK PK

## (undated) DEVICE — PROBE 8225101 5PK STD PRASS FL TIP ROHS

## (undated) DEVICE — BASIN EMSIS 700ML GRAPHITE PLAS KID SHP GRAD

## (undated) DEVICE — ELECTRODE ELECSURG NDL 2.8 INX7.2 CM COAT INSUL EDGE

## (undated) DEVICE — JELLY,LUBE,STERILE,FLIP TOP,TUBE,2-OZ: Brand: MEDLINE

## (undated) DEVICE — 12FR FRAZIER SUCTION HANDLE: Brand: CARDINAL HEALTH

## (undated) DEVICE — SUTURE VCRL SZ 3-0 L18IN ABSRB UD W/O NDL POLYGLACTIN 910 J110T

## (undated) DEVICE — CORD,CAUTERY,BIPOLAR,STERILE: Brand: MEDLINE

## (undated) DEVICE — INSTRUMENT POUCH,DOUBLE POCKET: Brand: DEVON

## (undated) DEVICE — SOLUTION SURG PREP POV IOD 7.5% 4 OZ

## (undated) DEVICE — SUTURE VCRL SZ 3-0 L27IN ABSRB UD L26MM SH 1/2 CIR J416H

## (undated) DEVICE — SUTURE VCRL + SZ 4-0 L27IN ABSRB UD L26MM SH 1/2 CIR VCP415H

## (undated) DEVICE — FORCEPS BX L240CM JAW DIA2.4MM ORNG L CAP W/ NDL DISP RAD

## (undated) DEVICE — PREP SOL PVP IODINE 4%  4 OZ/BTL

## (undated) DEVICE — STRIP,CLOSURE,WOUND,MEDI-STRIP,1/2X4: Brand: MEDLINE

## (undated) DEVICE — GLOVE ORANGE PI 7   MSG9070

## (undated) DEVICE — KENDALL SCD EXPRESS SLEEVES, KNEE LENGTH, MEDIUM: Brand: KENDALL SCD

## (undated) DEVICE — GAUZE,SPONGE,4"X4",16PLY,STRL,LF,10/TRAY: Brand: MEDLINE

## (undated) DEVICE — 3M™ STERI-STRIP™ COMPOUND BENZOIN TINCTURE 40 BAGS/CARTON 4 CARTONS/CASE C1544: Brand: 3M™ STERI-STRIP™

## (undated) DEVICE — MAGNETIC DRAPE: Brand: DEVON

## (undated) DEVICE — EMG TUBE 8229707 NIM TRIVANTAGE 7.0MM ID: Brand: NIM TRIVANTAGE™

## (undated) DEVICE — MEDICINE CUP, GRADUATED, STER: Brand: MEDLINE

## (undated) DEVICE — SUTURE MCRYL + SZ 5 0 L18IN ABSRB UD PC 3 L16MM 3 8 CIR MCP844G

## (undated) DEVICE — Z CONVERTED USE 2271043 CONTAINER SPEC COLL 4OZ SCR ON LID PEEL PCH

## (undated) DEVICE — 3M™ TEGADERM™ TRANSPARENT FILM DRESSING FRAME STYLE, 1624W, 2-3/8 IN X 2-3/4 IN (6 CM X 7 CM), 100/CT 4CT/CASE: Brand: 3M™ TEGADERM™

## (undated) DEVICE — SYRINGE BLB 2 PC STER 50